# Patient Record
Sex: MALE | Race: WHITE | NOT HISPANIC OR LATINO | Employment: FULL TIME | URBAN - METROPOLITAN AREA
[De-identification: names, ages, dates, MRNs, and addresses within clinical notes are randomized per-mention and may not be internally consistent; named-entity substitution may affect disease eponyms.]

---

## 2017-02-03 ENCOUNTER — TELEPHONE (OUTPATIENT)
Dept: GASTROENTEROLOGY | Facility: CLINIC | Age: 52
End: 2017-02-03

## 2017-02-03 NOTE — TELEPHONE ENCOUNTER
Third attempt to reach patient to schedule Colonoscopy. Not Scheduled at [facility]. Left Messages.

## 2017-03-16 DIAGNOSIS — I10 HYPERTENSION GOAL BP (BLOOD PRESSURE) < 140/90: ICD-10-CM

## 2017-03-16 NOTE — TELEPHONE ENCOUNTER
lisinopril (PRINIVIL/ZESTRIL) 10 MG tablet     Last Written Prescription Date: 12-  Last Fill Quantity: 30, # refills: 1  Last Office Visit with G, P or [facility] prescribing provider: 12-       Potassium   Date Value Ref Range Status   12/19/2016 3.5 3.4 - 5.3 mmol/L Final     Creatinine   Date Value Ref Range Status   12/19/2016 1.02 0.66 - 1.25 mg/dL Final     BP Readings from Last 3 Encounters:   12/19/16 152/82   10/09/15 (!) 143/91   02/14/14 (!) 159/95

## 2017-03-17 RX ORDER — LISINOPRIL 10 MG/1
10 TABLET ORAL DAILY
Qty: 30 TABLET | Refills: 0 | Status: SHIPPED | OUTPATIENT
Start: 2017-03-17 | End: 2017-05-09

## 2017-03-17 NOTE — TELEPHONE ENCOUNTER
Routing refill request to provider for review/approval because:  Labs out of range:  BP not at goal. Patient has not followed up with BP checks.   Tisha Chowdary R.N.

## 2017-03-17 NOTE — TELEPHONE ENCOUNTER
Temp re-fill only, please notify due for BP f/u.  Electronically Signed By: Olive Cervantes PA-C

## 2017-03-22 NOTE — TELEPHONE ENCOUNTER
Attempted to call patient - number not going through.    ValentinoHopefreedom Mcgee  -[facility]

## 2017-05-09 DIAGNOSIS — I10 HYPERTENSION GOAL BP (BLOOD PRESSURE) < 140/90: ICD-10-CM

## 2017-05-09 RX ORDER — LISINOPRIL 10 MG/1
10 TABLET ORAL DAILY
Qty: 30 TABLET | Refills: 0 | Status: SHIPPED | OUTPATIENT
Start: 2017-05-09 | End: 2017-11-03 | Stop reason: DRUGHIGH

## 2017-05-09 NOTE — TELEPHONE ENCOUNTER
Please call to set up OV, overdue for BP follow-up.   Electronically Signed By: Olive Cervantes PA-C

## 2017-05-09 NOTE — TELEPHONE ENCOUNTER
Routing refill request to provider for review/approval because:  Portia given x1 and patient did not follow up, please advise  Labs out of range:  BP not at goal  A break in medication  Leidy Rice RN, BSN  [facility] [place]

## 2017-05-09 NOTE — TELEPHONE ENCOUNTER
lisinopril (PRINIVIL/ZESTRIL) 10 MG tablet      Last Written Prescription Date: 3/17/2017  Last Fill Quantity: 30 tablet, # refills: 0  Last Office Visit with G, P or [facility] prescribing provider: 12/19/2016       Potassium   Date Value Ref Range Status   12/19/2016 3.5 3.4 - 5.3 mmol/L Final     Creatinine   Date Value Ref Range Status   12/19/2016 1.02 0.66 - 1.25 mg/dL Final     BP Readings from Last 3 Encounters:   12/19/16 152/82   10/09/15 (!) 143/91   02/14/14 (!) 159/95

## 2017-05-10 NOTE — TELEPHONE ENCOUNTER
Called pt and made appt for Friday at 420.  Pt states he has enough medication to last until his appt.  Removed pended med and closed encounter.  Janis Carter

## 2017-05-12 ENCOUNTER — OFFICE VISIT (OUTPATIENT)
Dept: FAMILY MEDICINE | Facility: CLINIC | Age: 52
End: 2017-05-12
Payer: COMMERCIAL

## 2017-05-12 VITALS
BODY MASS INDEX: 36.36 KG/M2 | WEIGHT: 254 LBS | HEART RATE: 83 BPM | OXYGEN SATURATION: 97 % | SYSTOLIC BLOOD PRESSURE: 142 MMHG | TEMPERATURE: 98.3 F | HEIGHT: 70 IN | DIASTOLIC BLOOD PRESSURE: 82 MMHG

## 2017-05-12 DIAGNOSIS — I10 HYPERTENSION GOAL BP (BLOOD PRESSURE) < 140/90: Primary | ICD-10-CM

## 2017-05-12 PROCEDURE — 36415 COLL VENOUS BLD VENIPUNCTURE: CPT | Performed by: PHYSICIAN ASSISTANT

## 2017-05-12 PROCEDURE — 80048 BASIC METABOLIC PNL TOTAL CA: CPT | Performed by: PHYSICIAN ASSISTANT

## 2017-05-12 PROCEDURE — 99213 OFFICE O/P EST LOW 20 MIN: CPT | Performed by: PHYSICIAN ASSISTANT

## 2017-05-12 RX ORDER — LISINOPRIL 20 MG/1
20 TABLET ORAL DAILY
Qty: 30 TABLET | Refills: 1 | Status: SHIPPED | OUTPATIENT
Start: 2017-05-12 | End: 2017-08-03

## 2017-05-12 NOTE — PATIENT INSTRUCTIONS
Some improvement today, but not yet at goal.  Increase to 20mg daily.  Re-check in 1 month.    Electronically Signed By: Olive Cervantes PA-C

## 2017-05-12 NOTE — PROGRESS NOTES
SUBJECTIVE:                                                    Eliseo Oneal is a 52 year old male who presents to clinic today for the following health issues:      Hypertension Follow-up; never came back for RN visit.  Still only taking 10mg of lisinopril.  BP still elevated.  Had been improving his routine with stopping soda and starting to walk.  Then decided they may buy a new house so then fell off the wagon again.   Feels he will be able to get back to walking dog though.   Tolerating medication well - no cough.       Outpatient blood pressures are not being checked.    Low Salt Diet: low salt       Amount of exercise or physical activity:     Problems taking medications regularly: No    Medication side effects: none    Diet: low salt      Problem list and histories reviewed & adjusted, as indicated.  Additional history: as documented    Patient Active Problem List   Diagnosis     CARDIOVASCULAR SCREENING; LDL GOAL LESS THAN 160     Obesity (BMI 30-39.9)     Hypertension goal BP (blood pressure) < 140/90     SCOTT (obstructive sleep apnea)     Ventral hernia without obstruction or gangrene     Past Surgical History:   Procedure Laterality Date     NO HISTORY OF SURGERY         Social History   Substance Use Topics     Smoking status: Never Smoker     Smokeless tobacco: Never Used     Alcohol use No     Family History   Problem Relation Age of Onset     DIABETES Father 58     Type II diabetes     CEREBROVASCULAR DISEASE Father      DIABETES Paternal Grandmother      Colon Cancer No family hx of      Prostate Cancer No family hx of      Coronary Artery Disease No family hx of      Hypertension No family hx of      Hyperlipidemia No family hx of      Breast Cancer No family hx of      Thyroid Disease No family hx of          Current Outpatient Prescriptions   Medication Sig Dispense Refill     lisinopril (PRINIVIL/ZESTRIL) 10 MG tablet Take 1 tablet (10 mg) by mouth daily Temp re-fill only due for BP  "follow-up. 30 tablet 0     No Known Allergies    Reviewed and updated as needed this visit by clinical staff       Reviewed and updated as needed this visit by Provider         ROS:  Constitutional, cardiovascular, pulmonary, systems are negative, except as otherwise noted.    OBJECTIVE:                                                    /82 (BP Location: Right arm, Cuff Size: Adult Large)  Pulse 83  Temp 98.3  F (36.8  C) (Oral)  Ht 5' 10\" (1.778 m)  Wt 254 lb (115.2 kg)  SpO2 97%  BMI 36.45 kg/m2  Body mass index is 36.45 kg/(m^2).  GENERAL: healthy, alert and no distress  RESP: lungs clear to auscultation - no rales, rhonchi or wheezes  CV: regular rate and rhythm, normal S1 S2, no S3 or S4, no murmur, click or rub, no peripheral edema and peripheral pulses strong  EXT: No LE edema.    Diagnostic Test Results:  none      ASSESSMENT/PLAN:                                                        ICD-10-CM    1. Hypertension goal BP (blood pressure) < 140/90 I10 lisinopril (PRINIVIL/ZESTRIL) 20 MG tablet     Basic metabolic panel  (Ca, Cl, CO2, Creat, Gluc, K, Na, BUN)   Encouraged to continue exercise and weight loss efforts as previously reviewed.  Repeat BMP since starting ACEI.  See Patient Instructions  Patient Instructions   Some improvement today, but not yet at goal.  Increase to 20mg daily.  Re-check in 1 month.    Electronically Signed By: Olive Cervantes PA-C          "

## 2017-05-12 NOTE — NURSING NOTE
"Chief Complaint   Patient presents with     Hypertension       Initial /82 (BP Location: Right arm, Cuff Size: Adult Large)  Pulse 83  Temp 98.3  F (36.8  C) (Oral)  Ht 5' 10\" (1.778 m)  Wt 254 lb (115.2 kg)  SpO2 97%  BMI 36.45 kg/m2 Estimated body mass index is 36.45 kg/(m^2) as calculated from the following:    Height as of this encounter: 5' 10\" (1.778 m).    Weight as of this encounter: 254 lb (115.2 kg).  Medication Reconciliation: complete    "

## 2017-05-12 NOTE — MR AVS SNAPSHOT
"              After Visit Summary   5/12/2017    Eliseo Oneal    MRN: [id number]           Patient Information     Date Of Birth          1965        Visit Information        Provider Department      5/12/2017 4:20 PM Olive Cervantes PA-C [facility] [place]        Today's Diagnoses     Hypertension goal BP (blood pressure) < 140/90    -  1      Care Instructions    Some improvement today, but not yet at goal.  Increase to 20mg daily.  Re-check in 1 month.    Electronically Signed By: Olive Cervantes PA-C          Follow-ups after your visit        Who to contact     If you have questions or need follow up information about today's clinic visit or your schedule please contact [facility] [place] directly at [phone].  Normal or non-critical lab and imaging results will be communicated to you by MyChart, letter or phone within 4 business days after the clinic has received the results. If you do not hear from us within 7 days, please contact the clinic through MyChart or phone. If you have a critical or abnormal lab result, we will notify you by phone as soon as possible.  Submit refill requests through [employer] or call your pharmacy and they will forward the refill request to us. Please allow 3 business days for your refill to be completed.          Additional Information About Your Visit        [employer] Information     [employer] lets you send messages to your doctor, view your test results, renew your prescriptions, schedule appointments and more. To sign up, go to [email or web address] . Click on \"Log in\" on the left side of the screen, which will take you to the Welcome page. Then click on \"Sign up Now\" on the right side of the page.     You will be asked to enter the access code listed below, as well as some personal information. Please follow the directions to create your username and password.     Your access code is: [email or web address]  Expires: 8/10/2017  4:28 PM     Your access " "code will  in 90 days. If you need help or a new code, please call your [facility] clinic or [phone].        Care EveryWhere ID     This is your Care EveryWhere ID. This could be used by other organizations to access your [facility] medical records  [id number]        Your Vitals Were     Pulse Temperature Height Pulse Oximetry BMI (Body Mass Index)       83 98.3  F (36.8  C) (Oral) 5' 10\" (1.778 m) 97% 36.45 kg/m2        Blood Pressure from Last 3 Encounters:   17 142/82   16 152/82   10/09/15 (!) 143/91    Weight from Last 3 Encounters:   17 254 lb (115.2 kg)   16 260 lb (117.9 kg)   10/09/15 241 lb (109.3 kg)              We Performed the Following     Basic metabolic panel  (Ca, Cl, CO2, Creat, Gluc, K, Na, BUN)          Today's Medication Changes          These changes are accurate as of: 17  4:28 PM.  If you have any questions, ask your nurse or doctor.               These medicines have changed or have updated prescriptions.        Dose/Directions    * lisinopril 10 MG tablet   Commonly known as:  PRINIVIL/ZESTRIL   This may have changed:  Another medication with the same name was added. Make sure you understand how and when to take each.   Used for:  Hypertension goal BP (blood pressure) < 140/90   Changed by:  Olive Cervantes PA-C        Dose:  10 mg   Take 1 tablet (10 mg) by mouth daily Temp re-fill only due for BP follow-up.   Quantity:  30 tablet   Refills:  0       * lisinopril 20 MG tablet   Commonly known as:  PRINIVIL/ZESTRIL   This may have changed:  You were already taking a medication with the same name, and this prescription was added. Make sure you understand how and when to take each.   Used for:  Hypertension goal BP (blood pressure) < 140/90   Changed by:  Olive Cervantes PA-C        Dose:  20 mg   Take 1 tablet (20 mg) by mouth daily   Quantity:  30 tablet   Refills:  1       * Notice:  This list has 2 medication(s) that are the " same as other medications prescribed for you. Read the directions carefully, and ask your doctor or other care provider to review them with you.         Where to get your medicines      These medications were sent to [id number] - [place], [place] - [address]  [address] [place] [address]     Phone:  [phone]     lisinopril 20 MG tablet                [facility] Care Provider    [facility]       No address on file        Thank you!     Thank you for choosing [facility]  for your care. Our goal is always to provide you with excellent care. Hearing back from our patients is one way we can continue to improve our services. Please take a few minutes to complete the written survey that you may receive in the mail after your visit with us. Thank you!             Your Updated Medication List - Protect others around you: Learn how to safely use, store and throw away your medicines at www.disposemymeds.org.          This list is accurate as of: 5/12/17  4:28 PM.  Always use your most recent med list.                   Brand Name Dispense Instructions for use    * lisinopril 10 MG tablet    PRINIVIL/ZESTRIL    30 tablet    Take 1 tablet (10 mg) by mouth daily Temp re-fill only due for BP follow-up.       * lisinopril 20 MG tablet    PRINIVIL/ZESTRIL    30 tablet    Take 1 tablet (20 mg) by mouth daily       * Notice:  This list has 2 medication(s) that are the same as other medications prescribed for you. Read the directions carefully, and ask your doctor or other care provider to review them with you.

## 2017-05-15 LAB
ANION GAP SERPL CALCULATED.3IONS-SCNC: 7 MMOL/L (ref 3–14)
BUN SERPL-MCNC: 15 MG/DL (ref 7–30)
CALCIUM SERPL-MCNC: 8.8 MG/DL (ref 8.5–10.1)
CHLORIDE SERPL-SCNC: 110 MMOL/L (ref 94–109)
CO2 SERPL-SCNC: 27 MMOL/L (ref 20–32)
CREAT SERPL-MCNC: 1.32 MG/DL (ref 0.66–1.25)
GFR SERPL CREATININE-BSD FRML MDRD: 57 ML/MIN/1.7M2
GLUCOSE SERPL-MCNC: 108 MG/DL (ref 70–99)
POTASSIUM SERPL-SCNC: 3.9 MMOL/L (ref 3.4–5.3)
SODIUM SERPL-SCNC: 144 MMOL/L (ref 133–144)

## 2017-05-19 DIAGNOSIS — N28.9 ABNORMAL RENAL FUNCTION: Primary | ICD-10-CM

## 2017-05-19 NOTE — PROGRESS NOTES
Please CALL patient with the following results:    BMP worsened in comparison to previous. Unclear if this is side effect from his lisinopril as rarely can cause acute kidney injury. Is he taking NSAIDs? If so, please have him discontinue these as well. Given we did increase his dose at last visit, please have him RTC next week to have labs repeated. If worse, we will need to have him stop medication and change to an alternative. Ok to continue for now though until re-assessed.     Electronically Signed By: Olive Cervantes PA-C

## 2017-05-25 DIAGNOSIS — N28.9 ABNORMAL RENAL FUNCTION: ICD-10-CM

## 2017-05-25 PROCEDURE — 80048 BASIC METABOLIC PNL TOTAL CA: CPT | Performed by: PHYSICIAN ASSISTANT

## 2017-05-25 PROCEDURE — 36415 COLL VENOUS BLD VENIPUNCTURE: CPT | Performed by: PHYSICIAN ASSISTANT

## 2017-05-25 NOTE — LETTER
[facility]          [address] [place] [address]                                           [phone]  May 27, 2017     Eliseo Oneal  [address] [address]      Dear Eliseo,    The results of your recent tests were reviewed and are as follows:    -Kidney function is normal (Cr, GFR), Sodium is normal, Potassium is normal, Calcium is normal, Glucose is normal (diabetes screening test).     Kidney function returned to normal. Ok to continue Lisinopril as previously discussed and we can consider repeating again in the next couple of months for stability.     Enclosed is a copy of the results.     Thank you for choosing [facility].  We appreciate the opportunity to serve you and look forward to supporting your healthcare needs in the future.    If you have any questions or concerns, please call me or my staff at [phone].      Sincerely,    SHARI Umaña

## 2017-05-26 LAB
ANION GAP SERPL CALCULATED.3IONS-SCNC: 8 MMOL/L (ref 3–14)
BUN SERPL-MCNC: 15 MG/DL (ref 7–30)
CALCIUM SERPL-MCNC: 8.6 MG/DL (ref 8.5–10.1)
CHLORIDE SERPL-SCNC: 108 MMOL/L (ref 94–109)
CO2 SERPL-SCNC: 27 MMOL/L (ref 20–32)
CREAT SERPL-MCNC: 1.02 MG/DL (ref 0.66–1.25)
GFR SERPL CREATININE-BSD FRML MDRD: 76 ML/MIN/1.7M2
GLUCOSE SERPL-MCNC: 115 MG/DL (ref 70–99)
POTASSIUM SERPL-SCNC: 3.6 MMOL/L (ref 3.4–5.3)
SODIUM SERPL-SCNC: 143 MMOL/L (ref 133–144)

## 2017-05-26 NOTE — PROGRESS NOTES
Please call patient with the following results:    -Kidney function is normal (Cr, GFR), Sodium is normal, Potassium is normal, Calcium is normal, Glucose is normal (diabetes screening test).     Kidney function returned to normal. Ok to continue Lisinopril as previously discussed and we can consider repeating again in the next couple of months for stability.    Electronically Signed By: Olive Cervantes PA-C

## 2017-08-03 DIAGNOSIS — I10 HYPERTENSION GOAL BP (BLOOD PRESSURE) < 140/90: ICD-10-CM

## 2017-08-03 RX ORDER — LISINOPRIL 20 MG/1
TABLET ORAL
Qty: 30 TABLET | Refills: 0 | Status: SHIPPED | OUTPATIENT
Start: 2017-08-03 | End: 2017-10-08

## 2017-08-03 NOTE — TELEPHONE ENCOUNTER
Due for an Office visit for further refills, only fill for 30 days     Latricia Shepherd RN, BSN  [facility]

## 2017-08-03 NOTE — TELEPHONE ENCOUNTER
lisinopril (PRINIVIL/ZESTRIL) 20 MG tablet      Last Written Prescription Date: 5/12/2017  Last Fill Quantity: 30 tablet, # refills: 1  Last Office Visit with [facility], [facility] or [facility] prescribing provider: 5/12/2017       Potassium   Date Value Ref Range Status   05/25/2017 3.6 3.4 - 5.3 mmol/L Final     Creatinine   Date Value Ref Range Status   05/25/2017 1.02 0.66 - 1.25 mg/dL Final     BP Readings from Last 3 Encounters:   05/12/17 142/82   12/19/16 152/82   10/09/15 (!) 143/91

## 2017-10-08 DIAGNOSIS — I10 HYPERTENSION GOAL BP (BLOOD PRESSURE) < 140/90: ICD-10-CM

## 2017-10-08 NOTE — LETTER
[facility]  [address] [place] [address]                                                                                                     [phone]    Eliseo Oneal  [address] [address]    October 16, 2017    Dear Eliseo Oneal,                               We were able to refill the medication you need for a one time brandon refill, but we will need to see you in the office before we can provide any further refills.  Please call [phone] to schedule an appointment for a medication check at your earliest convenience.    Thank you,  Janis

## 2017-10-09 NOTE — TELEPHONE ENCOUNTER
Routing refill request to provider for review/approval because:  Portia given x1 and patient did not follow up, please advise  Patient needs to be seen because:  Overdue since June for follow up labs and OV.     Tisha Chowdary R.N.  Will route to  to call patient to set up appointment. .

## 2017-10-09 NOTE — TELEPHONE ENCOUNTER
LISINOPRIL 20 MG TABLET    Last Written Prescription Date: 08/03/17  Last Fill Quantity: 30, # refills: 0  Last Office Visit with G, P or [facility] prescribing provider: 05/12/17       Potassium   Date Value Ref Range Status   05/25/2017 3.6 3.4 - 5.3 mmol/L Final     Creatinine   Date Value Ref Range Status   05/25/2017 1.02 0.66 - 1.25 mg/dL Final     BP Readings from Last 3 Encounters:   05/12/17 142/82   12/19/16 152/82   10/09/15 (!) 143/91

## 2017-10-09 NOTE — TELEPHONE ENCOUNTER
Lisinopril      Last Written Prescription Date: 8/3/2017  Last Fill Quantity: 30, # refills: 0  Last Office Visit with G, P or [facility] prescribing provider: 5/12/2017       Potassium   Date Value Ref Range Status   05/25/2017 3.6 3.4 - 5.3 mmol/L Final     Creatinine   Date Value Ref Range Status   05/25/2017 1.02 0.66 - 1.25 mg/dL Final     BP Readings from Last 3 Encounters:   05/12/17 142/82   12/19/16 152/82   10/09/15 (!) 143/91

## 2017-10-10 RX ORDER — LISINOPRIL 20 MG/1
TABLET ORAL
Qty: 15 TABLET | Refills: 0 | Status: SHIPPED | OUTPATIENT
Start: 2017-10-10 | End: 2017-11-03

## 2017-10-10 NOTE — TELEPHONE ENCOUNTER
Temporary re-fill, pt is due for OV. Please notify. No further re-fills will be provided until seen for follow-up.  Electronically Signed By: Olive Cervantes PA-C

## 2017-10-12 NOTE — TELEPHONE ENCOUNTER
Called [phone] and left non detailed message for pt to call back.  See below.  Janis Carter

## 2017-11-03 ENCOUNTER — OFFICE VISIT (OUTPATIENT)
Dept: FAMILY MEDICINE | Facility: CLINIC | Age: 52
End: 2017-11-03
Payer: COMMERCIAL

## 2017-11-03 VITALS
SYSTOLIC BLOOD PRESSURE: 130 MMHG | TEMPERATURE: 98.5 F | DIASTOLIC BLOOD PRESSURE: 82 MMHG | HEART RATE: 74 BPM | HEIGHT: 70 IN | BODY MASS INDEX: 33.21 KG/M2 | OXYGEN SATURATION: 99 % | WEIGHT: 232 LBS

## 2017-11-03 DIAGNOSIS — I10 HYPERTENSION GOAL BP (BLOOD PRESSURE) < 140/90: Primary | ICD-10-CM

## 2017-11-03 DIAGNOSIS — Z23 NEED FOR INFLUENZA VACCINATION: ICD-10-CM

## 2017-11-03 DIAGNOSIS — R73.9 ELEVATED BLOOD SUGAR: ICD-10-CM

## 2017-11-03 DIAGNOSIS — R63.4 LOSS OF WEIGHT: ICD-10-CM

## 2017-11-03 LAB — HBA1C MFR BLD: 5.7 % (ref 4.3–6)

## 2017-11-03 PROCEDURE — 36415 COLL VENOUS BLD VENIPUNCTURE: CPT | Performed by: PHYSICIAN ASSISTANT

## 2017-11-03 PROCEDURE — 83036 HEMOGLOBIN GLYCOSYLATED A1C: CPT | Performed by: PHYSICIAN ASSISTANT

## 2017-11-03 PROCEDURE — 82043 UR ALBUMIN QUANTITATIVE: CPT | Performed by: PHYSICIAN ASSISTANT

## 2017-11-03 PROCEDURE — 99213 OFFICE O/P EST LOW 20 MIN: CPT | Performed by: PHYSICIAN ASSISTANT

## 2017-11-03 RX ORDER — LISINOPRIL 20 MG/1
20 TABLET ORAL DAILY
Qty: 90 TABLET | Refills: 1 | Status: SHIPPED | OUTPATIENT
Start: 2017-11-03 | End: 2018-05-24

## 2017-11-03 NOTE — LETTER
November 10, 2017      Eliseo Oneal  [address] [address]        Dear ,    We are writing to inform you of your test results.    -A1C (diabetic test) is normal and indicates that your blood sugar has been in a normal range the last 3 months.  -Microalbumin (urine protein) test is normal.  ADVISE: recheck annually  Keep up the good work with your weight loss!    Resulted Orders   Hemoglobin A1c   Result Value Ref Range    Hemoglobin A1C 5.7 4.3 - 6.0 %   Albumin Random Urine Quantitative with Creat Ratio   Result Value Ref Range    Creatinine Urine 313 mg/dL    Albumin Urine mg/L 17 mg/L    Albumin Urine mg/g Cr 5.46 0 - 17 mg/g Cr       If you have any questions or concerns, please call the clinic at the number listed above.       Sincerely,        Olive Cervantes PA-C

## 2017-11-03 NOTE — PATIENT INSTRUCTIONS
Great work with weight loss!  Continue BP medication without changes.  Try to avoid NSAIDs as much as you can.  Will check hgb a1c to rule out diabetes as last BS was elevated.  Follow-up for physical when due again.    Electronically Signed By: Olive Cervantes PA-C

## 2017-11-03 NOTE — NURSING NOTE
"Chief Complaint   Patient presents with     Hypertension       Initial Pulse 74  Temp 98.5  F (36.9  C) (Oral)  Ht 5' 10\" (1.778 m)  Wt 232 lb (105.2 kg)  SpO2 99%  BMI 33.29 kg/m2 Estimated body mass index is 33.29 kg/(m^2) as calculated from the following:    Height as of this encounter: 5' 10\" (1.778 m).    Weight as of this encounter: 232 lb (105.2 kg).  Medication Reconciliation: complete    "

## 2017-11-03 NOTE — PROGRESS NOTES
SUBJECTIVE:   Eliseo Oneal is a 52 year old male who presents to clinic today for the following health issues:      Hypertension Follow-up; re-check since increase of lisinopril to 20mg daily. Tolerates this well and not having any issues. No cough.   Wife is getting him out to run and job. Sometimes this makes him sore so he will take advil on occasion.  Did [employer] throughout the week from that, but tryng not to take consistent.   Not a daily use for sure.  Has lost about 32 lbs.  Went to [employer] with his wife as well.   Cut out a lot of soda.  Daughter is participating as well.       Outpatient blood pressures are not being checked.    Low Salt Diet: not monitoring salt    Amount of exercise or physical activity:     Problems taking medications regularly: No    Medication side effects: none    Diet: regular (no restrictions)    2) Elevated BS and needs to have hgb a1c.    Problem list and histories reviewed & adjusted, as indicated.  Additional history: as documented    Patient Active Problem List   Diagnosis     CARDIOVASCULAR SCREENING; LDL GOAL LESS THAN 160     Obesity (BMI 30-39.9)     Hypertension goal BP (blood pressure) < 140/90     SCOTT (obstructive sleep apnea)     Ventral hernia without obstruction or gangrene     Past Surgical History:   Procedure Laterality Date     NO HISTORY OF SURGERY         Social History   Substance Use Topics     Smoking status: Never Smoker     Smokeless tobacco: Never Used     Alcohol use No     Family History   Problem Relation Age of Onset     DIABETES Father 58     Type II diabetes     CEREBROVASCULAR DISEASE Father      DIABETES Paternal Grandmother      Colon Cancer No family hx of      Prostate Cancer No family hx of      Coronary Artery Disease No family hx of      Hypertension No family hx of      Hyperlipidemia No family hx of      Breast Cancer No family hx of      Thyroid Disease No family hx of          Current Outpatient Prescriptions  "  Medication Sig Dispense Refill     lisinopril (PRINIVIL/ZESTRIL) 20 MG tablet Take 1 tablet (20 mg) by mouth daily 90 tablet 1     [DISCONTINUED] lisinopril (PRINIVIL/ZESTRIL) 20 MG tablet TAKE 1 TABLET BY MOUTH DAILY (Patient not taking: Reported on 11/3/2017) 15 tablet 0     [DISCONTINUED] lisinopril (PRINIVIL/ZESTRIL) 10 MG tablet Take 1 tablet (10 mg) by mouth daily Temp re-fill only due for BP follow-up. 30 tablet 0     No Known Allergies      Reviewed and updated as needed this visit by clinical staffTobacco  Allergies  Meds  Med Hx  Surg Hx  Fam Hx  Soc Hx      Reviewed and updated as needed this visit by Provider  Tobacco  Allergies  Meds  Med Hx  Surg Hx  Fam Hx  Soc Hx        ROS:  Constitutional + for weight loss, HEENT, cardiovascular, pulmonary systems are negative, except as otherwise noted.      OBJECTIVE:   /82  Pulse 74  Temp 98.5  F (36.9  C) (Oral)  Ht 5' 10\" (1.778 m)  Wt 232 lb (105.2 kg)  SpO2 99%  BMI 33.29 kg/m2  Body mass index is 33.29 kg/(m^2).  GENERAL: healthy, alert and no distress  EYES: Eyes grossly normal to inspection, PERRL and conjunctivae and sclerae normal  RESP: lungs clear to auscultation - no rales, rhonchi or wheezes  CV: regular rate and rhythm, normal S1 S2, no S3 or S4, no murmur, click or rub, no peripheral edema and peripheral pulses strong    Diagnostic Test Results:  none     ASSESSMENT/PLAN:       ICD-10-CM    1. Hypertension goal BP (blood pressure) < 140/90 I10 Albumin Random Urine Quantitative with Creat Ratio     lisinopril (PRINIVIL/ZESTRIL) 20 MG tablet   2. Elevated blood sugar R73.9 Hemoglobin A1c   3. Loss of weight R63.4    4. Need for influenza vaccination - pt declines Z23    BP improved after lisinopril adjustment last visit.  Was able to loose 30 lbs as well and encourage his on-going efforts.  Check microalbumin today and hgb a1c based on last elevated BS as well.  Can re-check BP again when due for CPE.  Pt in agreement with " plan.   See Patient Instructions  Patient Instructions   Great work with weight loss!  Continue BP medication without changes.  Try to avoid NSAIDs as much as you can.  Will check hgb a1c to rule out diabetes as last BS was elevated.  Follow-up for physical when due again.    Electronically Signed By: Olive Cervantes PA-C

## 2017-11-03 NOTE — MR AVS SNAPSHOT
"              After Visit Summary   11/3/2017    Eliseo Oneal    MRN: [id number]           Patient Information     Date Of Birth          1965        Visit Information        Provider Department      11/3/2017 10:40 AM Olive Cervantes PA-C [facility] [place]        Today's Diagnoses     Elevated blood sugar    -  1    Hypertension goal BP (blood pressure) < 140/90          Care Instructions    Great work with weight loss!  Continue BP medication without changes.  Try to avoid NSAIDs as much as you can.  Will check hgb a1c to rule out diabetes as last BS was elevated.  Follow-up for physical when due again.    Electronically Signed By: Olive Cervantes PA-C            Follow-ups after your visit        Who to contact     If you have questions or need follow up information about today's clinic visit or your schedule please contact [facility] directly at [phone].  Normal or non-critical lab and imaging results will be communicated to you by MyChart, letter or phone within 4 business days after the clinic has received the results. If you do not hear from us within 7 days, please contact the clinic through ServiceRelated[employer] or phone. If you have a critical or abnormal lab result, we will notify you by phone as soon as possible.  Submit refill requests through [employer] or call your pharmacy and they will forward the refill request to us. Please allow 3 business days for your refill to be completed.          Additional Information About Your Visit        MyChart Information     [employer] lets you send messages to your doctor, view your test results, renew your prescriptions, schedule appointments and more. To sign up, go to [email or web address] . Click on \"Log in\" on the left side of the screen, which will take you to the Welcome page. Then click on \"Sign up Now\" on the right side of the page.     You will be asked to enter the access code listed below, as well as some personal " "information. Please follow the directions to create your username and password.     Your access code is: [email or web address]  Expires: 2018 11:24 AM     Your access code will  in 90 days. If you need help or a new code, please call your [facility] clinic or [phone].        Care EveryWhere ID     This is your Care EveryWhere ID. This could be used by other organizations to access your [facility] medical records  [id number]        Your Vitals Were     Pulse Temperature Height Pulse Oximetry BMI (Body Mass Index)       74 98.5  F (36.9  C) (Oral) 5' 10\" (1.778 m) 99% 33.29 kg/m2        Blood Pressure from Last 3 Encounters:   17 130/82   17 142/82   16 152/82    Weight from Last 3 Encounters:   17 232 lb (105.2 kg)   17 254 lb (115.2 kg)   16 260 lb (117.9 kg)              We Performed the Following     Albumin Random Urine Quantitative with Creat Ratio     Hemoglobin A1c          Today's Medication Changes          These changes are accurate as of: 11/3/17 11:24 AM.  If you have any questions, ask your nurse or doctor.               These medicines have changed or have updated prescriptions.        Dose/Directions    lisinopril 20 MG tablet   Commonly known as:  PRINIVIL/ZESTRIL   This may have changed:  See the new instructions.   Used for:  Hypertension goal BP (blood pressure) < 140/90   Changed by:  Olive Cervantes PA-C        Dose:  20 mg   Take 1 tablet (20 mg) by mouth daily   Quantity:  90 tablet   Refills:  1            Where to get your medicines      These medications were sent to [facility] [address] [place]  [place], [place] - [address]  [address] [address]     Phone:  [phone]     lisinopril 20 MG tablet                Primary Care Provider Office Phone # Fax #    [facility] [phone] [address]  [place] [address]        Equal Access to Services     MARY URBINA AH: Jo Ann figueredo " Sugar, watrinida lujarrettbaljinder, rafael kaangel fraire, abhi owenstessa carlos. So [facility] [phone].    ATENCIÓN: Si habla bettye, tiene a childress disposición servicios gratuitos de asistencia lingüística. Qi al [phone].    We comply with applicable federal civil rights laws and [place] laws. We do not discriminate on the basis of race, color, national origin, age, disability, sex, sexual orientation, or gender identity.            Thank you!     Thank you for choosing [facility] [place]  for your care. Our goal is always to provide you with excellent care. Hearing back from our patients is one way we can continue to improve our services. Please take a few minutes to complete the written survey that you may receive in the mail after your visit with us. Thank you!             Your Updated Medication List - Protect others around you: Learn how to safely use, store and throw away your medicines at www.disposemymeds.org.          This list is accurate as of: 11/3/17 11:24 AM.  Always use your most recent med list.                   Brand Name Dispense Instructions for use Diagnosis    lisinopril 20 MG tablet    PRINIVIL/ZESTRIL    90 tablet    Take 1 tablet (20 mg) by mouth daily    Hypertension goal BP (blood pressure) < 140/90

## 2017-11-04 LAB
CREAT UR-MCNC: 313 MG/DL
MICROALBUMIN UR-MCNC: 17 MG/L
MICROALBUMIN/CREAT UR: 5.46 MG/G CR (ref 0–17)

## 2017-11-09 NOTE — PROGRESS NOTES
Please call or write patient with the following results:    -A1C (diabetic test) is normal and indicates that your blood sugar has been in a normal range the last 3 months.  -Microalbumin (urine protein) test is normal.  ADVISE: recheck annually  Keep up the good work with your weight loss!    Electronically Signed By: Olive Cervantes PA-C

## 2018-05-24 DIAGNOSIS — I10 HYPERTENSION GOAL BP (BLOOD PRESSURE) < 140/90: ICD-10-CM

## 2018-05-24 RX ORDER — LISINOPRIL 20 MG/1
20 TABLET ORAL DAILY
Qty: 30 TABLET | Refills: 0 | Status: SHIPPED | OUTPATIENT
Start: 2018-05-24 | End: 2018-09-17

## 2018-05-24 NOTE — TELEPHONE ENCOUNTER
Medication is being filled for 1 time refill only due to:  Patient needs to be seen because due HTN check and labs.   Leidy Rice RN, BSN  [facility] [place]

## 2018-05-24 NOTE — TELEPHONE ENCOUNTER
"Requested Prescriptions   Pending Prescriptions Disp Refills     lisinopril (PRINIVIL/ZESTRIL) 20 MG tablet  Last Written Prescription Date:11/3/17  Last Fill Quantity: 90 tablets,  # refills: 1   Last office visit: 11/3/2017 with prescribing provider:  Ministerio DUNHAM     Future Office Visit:   90 tablet 1     Sig: Take 1 tablet (20 mg) by mouth daily    ACE Inhibitors (Including Combos) Protocol Passed    5/24/2018 10:10 AM       Passed - Blood pressure under 140/90 in past 12 months    BP Readings from Last 3 Encounters:   11/03/17 130/82   05/12/17 142/82   12/19/16 152/82          Passed - Recent (12 mo) or future (30 days) visit within the authorizing provider's specialty    Patient had office visit in the last 12 months or has a visit in the next 30 days with authorizing provider or within the authorizing provider's specialty.  See \"Patient Info\" tab in inbasket, or \"Choose Columns\" in Meds & Orders section of the refill encounter.           Passed - Patient is age 18 or older       Passed - Normal serum creatinine on file in past 12 months    Recent Labs   Lab Test  05/25/17   1554   CR  1.02            Passed - Normal serum potassium on file in past 12 months    Recent Labs   Lab Test  05/25/17   1554   POTASSIUM  3.6               "

## 2018-09-06 ENCOUNTER — TELEPHONE (OUTPATIENT)
Dept: FAMILY MEDICINE | Facility: CLINIC | Age: 53
End: 2018-09-06

## 2018-09-06 NOTE — TELEPHONE ENCOUNTER
Needs of attention regarding:  -Wellness (Physical) Visit     Health Maintenance Topics with due status: Overdue       Topic Date Due    HIV SCREEN (SYSTEM ASSIGNED) 04/14/1981    COLON CANCER SCREEN (SYSTEM ASSIGNED) 04/14/2013    WELLNESS VISIT Q1 YR 12/19/2017    ADVANCE DIRECTIVE PLANNING Q5 YRS 04/14/2018    PHQ-2 Q1 YR 05/12/2018    INFLUENZA VACCINE 09/01/2018       Communication:  See Letter

## 2018-09-06 NOTE — LETTER
[facility] [place]          [address] [place] [address]                                                                                                       [phone]  September 6, 2018    Eliseo Oneal  [address]  [place] [address]      Dear Eliseo,    A review of your record shows that you are due for the following health maintenance exam(s):    -Colon Cancer Screening- Recommended every 5-10 years, depending on your history, in order to prevent and detect colon cancer at its earliest stages.  Colon cancer is now the second leading cause of death in the [place] for both men and women and there are over 130,000 new cases and 50,000 deaths per year from colon cancer.  Colonoscopies can prevent 90-95% of these deaths.  Problem lesions can be removed before they ever become cancer.  This test is not only looking for cancer, but also getting rid of precancerious lesions.  You are usually given some sedation which makes the test very comfortable for most people.      If you do not wish to do a colonoscopy or cannot afford to do one, at this time, there is another option. It is called a FIT test or Fecal Immunochemical Occult Blood Test (take home stool sample kit).  It does not replace the colonoscopy for colorectal cancer screening, but it can detect hidden bleeding in the lower colon.  It does need to be repeated every year and if a positive result is obtained, you would be referred for a colonoscopy. The FIT test is really easy to do and does not require any  diet or medication restrictions and involves only one collection sample.      If you have completed either one of these tests or had a flexible sigmoidoscopy in the past five years at another facility, please have the records sent to our clinic so that we can best coordinate your care.  Please call us ([phone]) if you have questions or would like arrange either to do a colonoscopy or obtain the  necessary test kit for the Fecal Occult Test.              In addition, if we see you for your annual health care maintenance exam (complete physical), and it has been over a year since your last exam, then please schedule that as well.  Thank you very much for choosing [facility].   We appreciate the opportunity to serve you and look forward to supporting your healthcare needs in the future.

## 2018-09-17 ENCOUNTER — OFFICE VISIT (OUTPATIENT)
Dept: FAMILY MEDICINE | Facility: CLINIC | Age: 53
End: 2018-09-17
Payer: COMMERCIAL

## 2018-09-17 VITALS
WEIGHT: 246 LBS | HEIGHT: 70 IN | DIASTOLIC BLOOD PRESSURE: 98 MMHG | BODY MASS INDEX: 35.22 KG/M2 | HEART RATE: 64 BPM | SYSTOLIC BLOOD PRESSURE: 144 MMHG | TEMPERATURE: 98 F | OXYGEN SATURATION: 98 %

## 2018-09-17 DIAGNOSIS — E66.01 MORBID OBESITY (H): ICD-10-CM

## 2018-09-17 DIAGNOSIS — Z12.11 SCREEN FOR COLON CANCER: ICD-10-CM

## 2018-09-17 DIAGNOSIS — Z13.1 SCREENING FOR DIABETES MELLITUS: ICD-10-CM

## 2018-09-17 DIAGNOSIS — I10 HYPERTENSION GOAL BP (BLOOD PRESSURE) < 140/90: Primary | ICD-10-CM

## 2018-09-17 LAB — HBA1C MFR BLD: 5.6 % (ref 0–5.6)

## 2018-09-17 PROCEDURE — 99213 OFFICE O/P EST LOW 20 MIN: CPT | Performed by: FAMILY MEDICINE

## 2018-09-17 PROCEDURE — 83036 HEMOGLOBIN GLYCOSYLATED A1C: CPT | Performed by: FAMILY MEDICINE

## 2018-09-17 PROCEDURE — 36415 COLL VENOUS BLD VENIPUNCTURE: CPT | Performed by: FAMILY MEDICINE

## 2018-09-17 PROCEDURE — 80053 COMPREHEN METABOLIC PANEL: CPT | Performed by: FAMILY MEDICINE

## 2018-09-17 RX ORDER — LISINOPRIL 20 MG/1
20 TABLET ORAL DAILY
Qty: 90 TABLET | Refills: 1 | Status: SHIPPED | OUTPATIENT
Start: 2018-09-17 | End: 2019-04-30

## 2018-09-17 RX ORDER — LISINOPRIL 20 MG/1
20 TABLET ORAL DAILY
Qty: 30 TABLET | Refills: 0 | Status: SHIPPED | OUTPATIENT
Start: 2018-09-17 | End: 2018-09-17

## 2018-09-17 NOTE — PROGRESS NOTES
SUBJECTIVE:                                                    Eliseo Oneal is a 53 year old male who presents to clinic today for the following health issues:      Hypertension Follow-up      Outpatient blood pressures are not being checked.    Low Salt Diet: no added salt    Amount of exercise or physical activity: 6-7 days/week for an average of 30-45 minutes    Problems taking medications regularly: Yes,  Off the medication for 30 days     Medication side effects: none    Diet: low salt    He is otherwise feeling well. Denies any headaches, lightheadedness, dizziness, vision changes, chest pain, dyspnea.    Problem list and histories reviewed & adjusted, as indicated.  Additional history: as documented    Patient Active Problem List   Diagnosis     CARDIOVASCULAR SCREENING; LDL GOAL LESS THAN 160     Obesity (BMI 30-39.9)     Hypertension goal BP (blood pressure) < 140/90     SCOTT (obstructive sleep apnea)     Ventral hernia without obstruction or gangrene     Obesity (BMI 35.0-39.9) with comorbidity (H)     Past Surgical History:   Procedure Laterality Date     NO HISTORY OF SURGERY         Social History   Substance Use Topics     Smoking status: Never Smoker     Smokeless tobacco: Never Used     Alcohol use No     Family History   Problem Relation Age of Onset     Diabetes Father 58     Type II diabetes     Cerebrovascular Disease Father      Diabetes Paternal Grandmother      Colon Cancer No family hx of      Prostate Cancer No family hx of      Coronary Artery Disease No family hx of      Hypertension No family hx of      Hyperlipidemia No family hx of      Breast Cancer No family hx of      Thyroid Disease No family hx of            ROS:  Constitutional, HEENT, cardiovascular, pulmonary, gi and gu systems are negative, except as otherwise noted.    OBJECTIVE:     BP (!) 144/98 (BP Location: Right arm, Patient Position: Chair, Cuff Size: Adult Large)  Pulse 64  Temp 98  F (36.7  C) (Oral)  Ht 5'  "10\" (1.778 m)  Wt 246 lb (111.6 kg)  SpO2 98%  BMI 35.3 kg/m2  Body mass index is 35.3 kg/(m^2).  GENERAL: healthy, alert and no distress  RESP: lungs clear to auscultation - no rales, rhonchi or wheezes  CV: regular rate and rhythm, normal S1 S2, no S3 or S4, no murmur, click or rub, no peripheral edema and peripheral pulses strong    Diagnostic Test Results:  none     ASSESSMENT/PLAN:   1. Hypertension goal BP (blood pressure) < 140/90: BP elevated, however, has not been on antihypertensive. Restart lisinopril 20 mg daily. Check electrolytes, kidney function.   - lisinopril (PRINIVIL/ZESTRIL) 20 MG tablet; Take 1 tablet (20 mg) by mouth daily  Dispense: 90 tablet; Refill: 1  - Comprehensive metabolic panel    2. Screen for colon cancer  - Fecal colorectal cancer screen (FIT); Future    3. Screening for diabetes mellitus  - Hemoglobin A1c    4. Obesity (BMI 35.0-39.9) with comorbidity (H)  - Hemoglobin A1c  - Comprehensive metabolic panel      Franco Almazan,   [facility] [place]      "

## 2018-09-17 NOTE — LETTER
September 21, 2018      Eliseo Oneal  [address] [address]        Dear ,    We are writing to inform you of your test results.      -Liver and gallbladder tests (ALT,AST, Alk phos,bilirubin) are normal.   -Kidney function (GFR) is normal.   -Sodium is normal.   -Potassium is normal.   -Glucose is normal.   -A1C (diabetic test) is normal and indicates that your blood sugar has been in a normal range the last 3 months.     Resulted Orders   Hemoglobin A1c   Result Value Ref Range    Hemoglobin A1C 5.6 0 - 5.6 %      Comment:      Normal <5.7% Prediabetes 5.7-6.4%  Diabetes 6.5% or higher - adopted from ADA   consensus guidelines.     Comprehensive metabolic panel   Result Value Ref Range    Sodium 142 133 - 144 mmol/L    Potassium 3.9 3.4 - 5.3 mmol/L    Chloride 106 94 - 109 mmol/L    Carbon Dioxide 25 20 - 32 mmol/L    Anion Gap 11 3 - 14 mmol/L    Glucose 82 70 - 99 mg/dL      Comment:      Non Fasting    Urea Nitrogen 22 7 - 30 mg/dL    Creatinine 0.97 0.66 - 1.25 mg/dL    GFR Estimate 80 >60 mL/min/1.7m2      Comment:      CORRECTED ON 09/20 AT 0949: PREVIOUSLY REPORTED AS 80 Non  GFR   Calc      GFR Estimate If Black >90 >60 mL/min/1.7m2      Comment:      CORRECTED ON 09/20 AT 0949: PREVIOUSLY REPORTED AS >90  GFR   Calc      Calcium 8.9 8.5 - 10.1 mg/dL      Comment:      CORRECTED ON 09/20 AT 0949: PREVIOUSLY REPORTED AS 6.9    Bilirubin Total 0.5 0.2 - 1.3 mg/dL    Albumin 3.8 3.4 - 5.0 g/dL    Protein Total 7.3 6.8 - 8.8 g/dL    Alkaline Phosphatase 84 40 - 150 U/L    ALT 34 0 - 70 U/L    AST 26 0 - 45 U/L       If you have any questions or concerns, please call the clinic at the number listed above.       Sincerely,        Franco Almazan, DO

## 2018-09-17 NOTE — MR AVS SNAPSHOT
"              After Visit Summary   9/17/2018    Eliseo Oneal    MRN: [id number]           Patient Information     Date Of Birth          1965        Visit Information        Provider Department      9/17/2018 5:40 PM Franco Almazan, DO [facility]        Today's Diagnoses     Hypertension goal BP (blood pressure) < 140/90    -  1    Screen for colon cancer        Screening for diabetes mellitus        Obesity (BMI 35.0-39.9) with comorbidity (H)           Follow-ups after your visit        Follow-up notes from your care team     Return in about 6 months (around 3/17/2019) for BP Recheck.      Future tests that were ordered for you today     Open Future Orders        Priority Expected Expires Ordered    Fecal colorectal cancer screen (FIT) Routine 10/8/2018 12/10/2018 9/17/2018            Who to contact     If you have questions or need follow up information about today's clinic visit or your schedule please contact [facility] [place] directly at [phone].  Normal or non-critical lab and imaging results will be communicated to you by MyChart, letter or phone within 4 business days after the clinic has received the results. If you do not hear from us within 7 days, please contact the clinic through avandeohart or phone. If you have a critical or abnormal lab result, we will notify you by phone as soon as possible.  Submit refill requests through [employer] or call your pharmacy and they will forward the refill request to us. Please allow 3 business days for your refill to be completed.          Additional Information About Your Visit        Care EveryWhere ID     This is your Care EveryWhere ID. This could be used by other organizations to access your [facility] medical records  [id number]        Your Vitals Were     Pulse Temperature Height Pulse Oximetry BMI (Body Mass Index)       64 98  F (36.7  C) (Oral) 5' 10\" (1.778 m) 98% 35.3 kg/m2        Blood Pressure from Last 3 Encounters: "   09/17/18 (!) 144/98   11/03/17 130/82   05/12/17 142/82    Weight from Last 3 Encounters:   09/17/18 246 lb (111.6 kg)   11/03/17 232 lb (105.2 kg)   05/12/17 254 lb (115.2 kg)              We Performed the Following     Comprehensive metabolic panel     Hemoglobin A1c          Where to get your medicines      These medications were sent to [facility] [address] [place]  [place] - [address]  [address] [place] [address]     Phone:  [phone]     lisinopril 20 MG tablet          Primary Care Provider Office Phone # Fax #    [facility] [phone] [address]  [facility] [address]        Equal Access to Services     MARY URBINA : Hadii grant mcmanus hadasho Sojohanna, waaxda luqadaha, qaybta kaalmada adeegyada, abhi miranda . So [place] -[phone].    ATENCIÓN: Si habla español, tiene a childress disposición servicios gratuitos de asistencia lingüística. Llame al [phone].    We comply with applicable federal civil rights laws and [place] laws. We do not discriminate on the basis of race, color, national origin, age, disability, sex, sexual orientation, or gender identity.            Thank you!     Thank you for choosing [facility]  for your care. Our goal is always to provide you with excellent care. Hearing back from our patients is one way we can continue to improve our services. Please take a few minutes to complete the written survey that you may receive in the mail after your visit with us. Thank you!             Your Updated Medication List - Protect others around you: Learn how to safely use, store and throw away your medicines at www.disposemymeds.org.          This list is accurate as of 9/17/18  6:12 PM.  Always use your most recent med list.                   Brand Name Dispense Instructions for use Diagnosis    lisinopril 20 MG tablet    PRINIVIL/ZESTRIL    30 tablet    Take 1 tablet (20 mg) by mouth daily     Hypertension goal BP (blood pressure) < 140/90

## 2018-09-20 LAB
ALBUMIN SERPL-MCNC: 3.8 G/DL (ref 3.4–5)
ALP SERPL-CCNC: 84 U/L (ref 40–150)
ALT SERPL W P-5'-P-CCNC: 34 U/L (ref 0–70)
ANION GAP SERPL CALCULATED.3IONS-SCNC: 11 MMOL/L (ref 3–14)
AST SERPL W P-5'-P-CCNC: 26 U/L (ref 0–45)
BILIRUB SERPL-MCNC: 0.5 MG/DL (ref 0.2–1.3)
BUN SERPL-MCNC: 22 MG/DL (ref 7–30)
CALCIUM SERPL-MCNC: 8.9 MG/DL (ref 8.5–10.1)
CHLORIDE SERPL-SCNC: 106 MMOL/L (ref 94–109)
CO2 SERPL-SCNC: 25 MMOL/L (ref 20–32)
CREAT SERPL-MCNC: 0.97 MG/DL (ref 0.66–1.25)
GFR SERPL CREATININE-BSD FRML MDRD: 80 ML/MIN/1.7M2
GLUCOSE SERPL-MCNC: 82 MG/DL (ref 70–99)
POTASSIUM SERPL-SCNC: 3.9 MMOL/L (ref 3.4–5.3)
PROT SERPL-MCNC: 7.3 G/DL (ref 6.8–8.8)
SODIUM SERPL-SCNC: 142 MMOL/L (ref 133–144)

## 2019-03-08 ENCOUNTER — OFFICE VISIT (OUTPATIENT)
Dept: SLEEP MEDICINE | Facility: CLINIC | Age: 54
End: 2019-03-08
Payer: COMMERCIAL

## 2019-03-08 VITALS
DIASTOLIC BLOOD PRESSURE: 104 MMHG | RESPIRATION RATE: 16 BRPM | HEART RATE: 61 BPM | BODY MASS INDEX: 35.5 KG/M2 | OXYGEN SATURATION: 96 % | WEIGHT: 248 LBS | HEIGHT: 70 IN | SYSTOLIC BLOOD PRESSURE: 161 MMHG

## 2019-03-08 DIAGNOSIS — G47.33 OSA (OBSTRUCTIVE SLEEP APNEA): Primary | ICD-10-CM

## 2019-03-08 PROCEDURE — 99202 OFFICE O/P NEW SF 15 MIN: CPT | Performed by: INTERNAL MEDICINE

## 2019-03-08 ASSESSMENT — MIFFLIN-ST. JEOR: SCORE: 1966.17

## 2019-03-08 NOTE — PATIENT INSTRUCTIONS
Your BMI is Body mass index is 35.58 kg/m .  Weight management is a personal decision.  If you are interested in exploring weight loss strategies, the following discussion covers the approaches that may be successful. Body mass index (BMI) is one way to tell whether you are at a healthy weight, overweight, or obese. It measures your weight in relation to your height.  A BMI of 18.5 to 24.9 is in the healthy range. A person with a BMI of 25 to 29.9 is considered overweight, and someone with a BMI of 30 or greater is considered obese. More than two-thirds of American adults are considered overweight or obese.  Being overweight or obese increases the risk for further weight gain. Excess weight may lead to heart disease and diabetes.  Creating and following plans for healthy eating and physical activity may help you improve your health.  Weight control is part of healthy lifestyle and includes exercise, emotional health, and healthy eating habits. Careful eating habits lifelong are the mainstay of weight control. Though there are significant health benefits from weight loss, long-term weight loss with diet alone may be very difficult to achieve- studies show long-term success with dietary management in less than 10% of people. Attaining a healthy weight may be especially difficult to achieve in those with severe obesity. In some cases, medications, devices and surgical management might be considered.  What can you do?  If you are overweight or obese and are interested in methods for weight loss, you should discuss this with your provider.     Consider reducing daily calorie intake by 500 calories.     Keep a food journal.     Avoiding skipping meals, consider cutting portions instead.    Diet combined with exercise helps maintain muscle while optimizing fat loss. Strength training is particularly important for building and maintaining muscle mass. Exercise helps reduce stress, increase energy, and improves fitness.  Increasing exercise without diet control, however, may not burn enough calories to loose weight.       Start walking three days a week 10-20 minutes at a time    Work towards walking thirty minutes five days a week     Eventually, increase the speed of your walking for 1-2 minutes at time    In addition, we recommend that you review healthy lifestyles and methods for weight loss available through the National Institutes of Health patient information sites:  http://win.niddk.nih.gov/publications/index.htm    And look into health and wellness programs that may be available through your health insurance provider, employer, local community center, or spencer club.    Weight management plan: Patient was referred to their PCP to discuss a diet and exercise plan.      Eliseo to follow up with Primary Care provider regarding elevated blood pressure.

## 2019-03-08 NOTE — NURSING NOTE
"Chief Complaint   Patient presents with     Sleep Problem     Obtain CPAP supplies       Initial BP (!) 161/104   Pulse 61   Resp 16   Ht 1.778 m (5' 10\")   Wt 112.5 kg (248 lb)   SpO2 96%   BMI 35.58 kg/m   Estimated body mass index is 35.58 kg/m  as calculated from the following:    Height as of this encounter: 1.778 m (5' 10\").    Weight as of this encounter: 112.5 kg (248 lb).    Medication Reconciliation: complete     ESS   Neck 48cm  Tata Marques        "

## 2019-03-08 NOTE — PROGRESS NOTES
Sleep Consultation:    Date on this visit: 3/8/2019    Primary Physician: Clinic, [facility]     Chief complaint: sleep apnea    Presenting History:     Eliseo Oneal presents to clinic to [facility] for his sleep apnea.     He was diagnosed with severe sleep apnea in 2014. HST on 2/17/2014 showed an AHI of 43.9 per hour. He is prescribed auto PAP therapy.     Medical history is significant for hypertension.     Overall, he rates the experience with PAP as 10 (0 poor, 10 great). The mask is comfortable. The mask is not leaking.  He is not snoring with the mask on. He is not having gasp arousals.  He is not having significant oral/nasal dryness. The pressure settings are comfortable.     He uses nasal mask.     He does feel rested in the morning.    Total score - [place]: 5 (12/19/2018  8:26 AM)    Respironics    Auto-PAP 5-15 cmH2O download:  30/30 total days of use. 0 nonuse days.  Average use 5 hours 34 minutes per day.  93% days with >4 hours use.  Large leak 16 secs per day average. CPAP 90% pressure 9.2cm. AHI 0.9      Eliseo goes to sleep at 10:00 PM during the week. He wakes up at 5:00 AM with an alarm. He falls asleep in 5 minutes.  Eliseo denies difficulty falling asleep.  He wakes up 0 times a night. On weekends, Eliseo goes to sleep at 11:00 PM.  He wakes up at 7:00 AM without an alarm. He falls asleep in 5 minutes.  Patient gets an average of 7 hours of sleep per night.     Eliseo does not do shift work.       Patient sleeps on his back and side. He denies no morning dry mouth, morning headaches and restless legs. Eliseo denies any bruxism, sleep walking, sleep talking, dream enactment, sleep paralysis, cataplexy and hypnogogic/hypnopompic hallucinations.    Eliseo naps 0 times per week . He takes no inadvertant naps.  He denies closing eyes, dozing and falling asleep while driving.  Patient was counseled on the importance of driving while alert, to pull over if drowsy, or nap before getting  into the vehicle if sleepy.      He uses 1 cups/day of coffee. Last caffeine intake is usually before noon.    Allergies:    No Known Allergies    Medications:    Current Outpatient Medications   Medication Sig Dispense Refill     lisinopril (PRINIVIL/ZESTRIL) 20 MG tablet Take 1 tablet (20 mg) by mouth daily 90 tablet 1       Problem List:  Patient Active Problem List    Diagnosis Date Noted     Obesity (BMI 35.0-39.9) with comorbidity (H) 09/17/2018     Priority: Medium     Obesity (BMI 30-39.9) 12/19/2016     Priority: Medium     Hypertension goal BP (blood pressure) < 140/90 12/19/2016     Priority: Medium     SCOTT (obstructive sleep apnea) 12/19/2016     Priority: Medium     Ventral hernia without obstruction or gangrene 12/19/2016     Priority: Medium     CARDIOVASCULAR SCREENING; LDL GOAL LESS THAN 160 08/31/2012     Priority: Medium        Past Medical/Surgical History:  Past Medical History:   Diagnosis Date     No active medical problems      Past Surgical History:   Procedure Laterality Date     NO HISTORY OF SURGERY         Social History:  Social History     Socioeconomic History     Marital status:      Spouse name: Not on file     Number of children: Not on file     Years of education: Not on file     Highest education level: Not on file   Occupational History     Not on file   Social Needs     Financial resource strain: Not on file     Food insecurity:     Worry: Not on file     Inability: Not on file     Transportation needs:     Medical: Not on file     Non-medical: Not on file   Tobacco Use     Smoking status: Never Smoker     Smokeless tobacco: Never Used   Substance and Sexual Activity     Alcohol use: No     Drug use: No     Sexual activity: Yes     Partners: Female   Lifestyle     Physical activity:     Days per week: Not on file     Minutes per session: Not on file     Stress: Not on file   Relationships     Social connections:     Talks on phone: Not on file     Gets together: Not on  file     Attends Faith service: Not on file     Active member of club or organization: Not on file     Attends meetings of clubs or organizations: Not on file     Relationship status: Not on file     Intimate partner violence:     Fear of current or ex partner: Not on file     Emotionally abused: Not on file     Physically abused: Not on file     Forced sexual activity: Not on file   Other Topics Concern     Parent/sibling w/ CABG, MI or angioplasty before 65F 55M? No   Social History Narrative     Not on file       Family History:  Family History   Problem Relation Age of Onset     Diabetes Father 58        Type II diabetes     Cerebrovascular Disease Father      Diabetes Paternal Grandmother      Colon Cancer No family hx of      Prostate Cancer No family hx of      Coronary Artery Disease No family hx of      Hypertension No family hx of      Hyperlipidemia No family hx of      Breast Cancer No family hx of      Thyroid Disease No family hx of        Review of Systems:  A complete review of systems reviewed by me is negative with the exeption of what has been mentioned in the history of present illness.  CONSTITUTIONAL: NEGATIVE for weight gain/loss, fever, chills, sweats or night sweats, drug allergies.  EYES: NEGATIVE for changes in vision, blind spots, double vision.  ENT: NEGATIVE for ear pain, sore throat, sinus pain, post-nasal drip, runny nose, bloody nose  CARDIAC: NEGATIVE for fast heartbeats or fluttering in chest, chest pain or pressure, breathlessness when lying flat, swollen legs or swollen feet.  NEUROLOGIC: NEGATIVE headaches, weakness or numbness in the arms or legs.  DERMATOLOGIC: NEGATIVE for rashes, new moles or change in mole(s)  PULMONARY: NEGATIVE SOB at rest, SOB with activity, dry cough, productive cough, coughing up blood, wheezing or whistling when breathing.    GASTROINTESTINAL: NEGATIVE for nausea or vomitting, loose or watery stools, fat or grease in stools, constipation,  "abdominal pain, bowel movements black in color or blood noted.  GENITOURINARY: NEGATIVE for pain during urination, blood in urine, urinating more frequently than usual, irregular menstrual periods.  MUSCULOSKELETAL: NEGATIVE for muscle pain, bone or joint pain, swollen joints.  ENDOCRINE: NEGATIVE for increased thirst or urination, diabetes.  LYMPHATIC: NEGATIVE for swollen lymph nodes, lumps or bumps in the breasts or nipple discharge.    Physical Examination:  Vitals: BP (!) 161/104   Pulse 61   Resp 16   Ht 1.778 m (5' 10\")   Wt 112.5 kg (248 lb)   SpO2 96%   BMI 35.58 kg/m    BMI= Body mass index is 35.58 kg/m .         [place] Total Score 2/14/2014   Total score - [place] 11       TAHIR Total Score: 2 (03/08/19 0907)    GENERAL APPEARANCE: healthy, alert and no distress  EYES: Eyes grossly normal to inspection, PERRL and conjunctivae and sclerae normal  HENT: nose and mouth without ulcers or lesions, oropharynx crowded, uvula elongated, soft palate dependent and tongue base enlarged  NECK: no adenopathy, no asymmetry, masses, or scars and thyroid normal to palpation  RESP: lungs clear to auscultation - no rales, rhonchi or wheezes  CV: regular rates and rhythm, normal S1 S2, no S3 or S4 and no murmur, click or rub  MS: extremities normal- no gross deformities noted  NEURO: Normal strength and tone, mentation intact and speech normal  PSYCH: mentation appears normal and affect normal/bright  Mallampati Class: IV.  Tonsillar Stage: 1  hidden by pillars.    Impression/Plan:    1. Obstructive sleep apnea, severe   2. Hypertension     - Patient is on auto PAP therapy for severe sleep apnea. He uses therapy consistently and he is benefiting form PAP use. Regular compliance and normal AHI is demonstrated on download.     Plan:     1. Continue auto PAP therapy     Obstructive sleep apnea reviewed.  CPAP download reviewed.   Complications of untreated sleep apnea were reviewed.    Dr. Joselito Sarah     CC: No ref. " provider found

## 2019-04-30 DIAGNOSIS — I10 HYPERTENSION GOAL BP (BLOOD PRESSURE) < 140/90: ICD-10-CM

## 2019-04-30 NOTE — TELEPHONE ENCOUNTER
"Requested Prescriptions   Pending Prescriptions Disp Refills     lisinopril (PRINIVIL/ZESTRIL) 20 MG tablet [Pharmacy Med Name: LISINOPRIL 20MG TABS]  Last Written Prescription Date:  9/17/2018  Last Fill Quantity: 90 tablet,  # refills: 1   Last office visit: 9/17/2018 with prescribing provider:  Tha     Future Office Visit:       90 tablet 1     Sig: TAKE ONE TABLET (20 MG) BY MOUTH EVERY DAY       ACE Inhibitors (Including Combos) Protocol Failed - 4/30/2019  4:15 AM        Failed - Blood pressure under 140/90 in past 12 months     BP Readings from Last 3 Encounters:   03/08/19 (!) 161/104   09/17/18 (!) 144/98   11/03/17 130/82             Passed - Recent (12 mo) or future (30 days) visit within the authorizing provider's specialty     Patient had office visit in the last 12 months or has a visit in the next 30 days with authorizing provider or within the authorizing provider's specialty.  See \"Patient Info\" tab in inbasket, or \"Choose Columns\" in Meds & Orders section of the refill encounter.              Passed - Medication is active on med list        Passed - Patient is age 18 or older        Passed - Normal serum creatinine on file in past 12 months     Recent Labs   Lab Test 09/17/18 1815   CR 0.97             Passed - Normal serum potassium on file in past 12 months     Recent Labs   Lab Test 09/17/18 1815   POTASSIUM 3.9             "

## 2019-04-30 NOTE — TELEPHONE ENCOUNTER
Routing refill request to provider for review/approval because:  Out of range:  Blood pressure  Patient needs to be seen because:  Due for HTN follow up  YIFAN RobinsN, RN  [facility]

## 2019-05-02 RX ORDER — LISINOPRIL 20 MG/1
TABLET ORAL
Qty: 30 TABLET | Refills: 0 | Status: SHIPPED | OUTPATIENT
Start: 2019-05-02 | End: 2019-05-31

## 2019-05-02 NOTE — TELEPHONE ENCOUNTER
30 day refill signed. Due for BP follow up. Please assist in scheduling appointment.    Franco Almazan,   5/2/2019 1:27 PM

## 2019-05-31 ENCOUNTER — OFFICE VISIT (OUTPATIENT)
Dept: FAMILY MEDICINE | Facility: CLINIC | Age: 54
End: 2019-05-31
Payer: COMMERCIAL

## 2019-05-31 VITALS
WEIGHT: 247 LBS | DIASTOLIC BLOOD PRESSURE: 94 MMHG | OXYGEN SATURATION: 97 % | BODY MASS INDEX: 35.44 KG/M2 | HEART RATE: 73 BPM | TEMPERATURE: 98.1 F | SYSTOLIC BLOOD PRESSURE: 136 MMHG

## 2019-05-31 DIAGNOSIS — L30.9 ECZEMA, UNSPECIFIED TYPE: ICD-10-CM

## 2019-05-31 DIAGNOSIS — I10 HYPERTENSION GOAL BP (BLOOD PRESSURE) < 140/90: Primary | ICD-10-CM

## 2019-05-31 PROCEDURE — 99214 OFFICE O/P EST MOD 30 MIN: CPT | Performed by: FAMILY MEDICINE

## 2019-05-31 RX ORDER — HYDROCORTISONE VALERATE 2 MG/G
OINTMENT TOPICAL 2 TIMES DAILY
Qty: 60 G | Refills: 3 | Status: SHIPPED | OUTPATIENT
Start: 2019-05-31 | End: 2019-05-31

## 2019-05-31 RX ORDER — LISINOPRIL 20 MG/1
TABLET ORAL
Qty: 90 TABLET | Refills: 1 | Status: SHIPPED | OUTPATIENT
Start: 2019-05-31 | End: 2020-01-05

## 2019-05-31 RX ORDER — HYDROCORTISONE VALERATE 2 MG/G
OINTMENT TOPICAL
Qty: 60 G | Refills: 1 | Status: SHIPPED | OUTPATIENT
Start: 2019-05-31 | End: 2020-10-30

## 2019-05-31 NOTE — PROGRESS NOTES
Subjective     Eliseo Oneal is a 54 year old male who presents to clinic today for the following health issues:    HPI   Hypertension Follow-up      Do you check your blood pressure regularly outside of the clinic? No     Are you following a low salt diet? Yes    Are your blood pressures ever more than 140 on the top number (systolic) OR more   than 90 on the bottom number (diastolic), for example 140/90? Yes    Amount of exercise or physical activity: 1 day/week for an average of 30-45 minutes    Problems taking medications regularly: No    Medication side effects: none    Diet: regular (no restrictions)    Denies any headaches, lightheadedness, dizziness, vision changes, chest pain, palpitations, shortness of breath, dyspnea, numbness/tingling.      Skin rash: dry rough erythematous patch on right elbow. Denies any injury, trauma. No new contacts. Denies any itching or pain. No fevers or chills.      Patient Active Problem List   Diagnosis     CARDIOVASCULAR SCREENING; LDL GOAL LESS THAN 160     Obesity (BMI 30-39.9)     Hypertension goal BP (blood pressure) < 140/90     SCOTT (obstructive sleep apnea)     Ventral hernia without obstruction or gangrene     Obesity (BMI 35.0-39.9) with comorbidity (H)     Past Surgical History:   Procedure Laterality Date     NO HISTORY OF SURGERY         Social History     Tobacco Use     Smoking status: Never Smoker     Smokeless tobacco: Never Used   Substance Use Topics     Alcohol use: No     Family History   Problem Relation Age of Onset     Diabetes Father 58        Type II diabetes     Cerebrovascular Disease Father      Diabetes Paternal Grandmother      Colon Cancer No family hx of      Prostate Cancer No family hx of      Coronary Artery Disease No family hx of      Hypertension No family hx of      Hyperlipidemia No family hx of      Breast Cancer No family hx of      Thyroid Disease No family hx of              Reviewed and updated as needed this visit by  Provider  Tobacco  Allergies  Meds  Problems  Med Hx  Surg Hx  Fam Hx         Review of Systems   ROS COMP: Constitutional, HEENT, cardiovascular, pulmonary, gi and gu systems are negative, except as otherwise noted.      Objective    BP (!) 136/94   Pulse 73   Temp 98.1  F (36.7  C) (Oral)   Wt 112 kg (247 lb)   SpO2 97%   BMI 35.44 kg/m    Body mass index is 35.44 kg/m .  Physical Exam   GENERAL: healthy, alert and no distress  NECK: no adenopathy, no asymmetry, masses, or scars and thyroid normal to palpation  RESP: lungs clear to auscultation - no rales, rhonchi or wheezes  CV: regular rate and rhythm, normal S1 S2, no S3 or S4, no murmur, click or rub, no peripheral edema and peripheral pulses strong  ABDOMEN: soft, nontender, no hepatosplenomegaly, no masses and bowel sounds normal  MS: no gross musculoskeletal defects noted, no edema  SKIN: rough, erythematous patch on right elbow    Diagnostic Test Results:  none         Assessment & Plan     1. Hypertension goal BP (blood pressure) < 140/90: just slightly above goal. He has recently started weight watchers and is monitoring diet closely. Will keep lisinopril at the same dose and he can check blood pressures at home. If persistently above goal, let me know via MyChart. Would then increase lisinopril dose.   - lisinopril (PRINIVIL/ZESTRIL) 20 MG tablet; TAKE ONE TABLET (20 MG) BY MOUTH EVERY DAY  Dispense: 90 tablet; Refill: 1    2. Eczema, unspecified type: start steroid ointment. Use moisturizing cream. Follow up if not improving.  - hydrocortisone valerate (WEST-MICHELINE) 0.2 % external ointment; Apply thin layer of ointment to rash on elbow twice daily for 14 days.  Dispense: 60 g; Refill: 1      Return in about 6 months (around 11/30/2019) for BP Recheck.    Franco Almazan,   [facility]

## 2019-12-19 ENCOUNTER — TELEPHONE (OUTPATIENT)
Dept: FAMILY MEDICINE | Facility: CLINIC | Age: 54
End: 2019-12-19

## 2019-12-19 NOTE — LETTER
[facility]  [address]  [place] [id number] [phone]  December 19, 2019    Eliseo Oneal  [address] [address] [address]    Dear Eliseo,    I care about your health and have reviewed your health plan. I have reviewed your medical conditions, medication list, and lab results and am making recommendations based on this review, to better manage your health.    You are in particular need of attention regarding:  -High Blood Pressure  -Colon Cancer Screening  -Wellness (Physical) Visit    BP Readings from Last 6 Encounters:   05/31/19 (!) 136/94   03/08/19 (!) 161/104   09/17/18 (!) 144/98   11/03/17 130/82   05/12/17 142/82   12/19/16 152/82       I am recommending that you:  -schedule a WELLNESS (Physical) APPOINTMENT with me.   I will check fasting labs the same day - nothing to eat except water and meds for 8-10 hours prior.    -schedule a COLONOSCOPY to look for colon cancer (due every 10 years or 5 years in higher risk situations.)        Colon cancer is now the second leading cause of cancer-related deaths in the [place] for both men and women and there are over 130,000 new cases and 50,000 deaths per year from colon cancer.  Colonoscopies can prevent 90-95% of these deaths.  Problem lesions can be removed before they ever become cancer.  This test is not only looking for cancer, but also getting rid of precancerious lesions.    If you are under/uninsured, we recommend you contact the [employer] program. [employer] is a free colorectal cancer screening program that provides colonoscopies for eligible under/uninsured [place] men and women. If you are interested in receiving a free colonoscopy, please call [employer] at [phone] (mention code [employer]) to see if you re eligible.      If you do not wish to do a colonoscopy or cannot afford to do one, at this time, there is another option. It is called a FIT test or Fecal Immunochemical  Occult Blood Test (take home stool sample kit).  It does not replace the colonoscopy for colorectal cancer screening, but it can detect hidden bleeding in the lower colon.  It does need to be repeated every year and if a positive result is obtained, you would be referred for a colonoscopy.          If you have completed either one of these tests at another facility, please call with the details of when and where the tests were done and if they were normal or not. Or have the records sent to our clinic so that we can best coordinate your care.      Here is a list of Health Maintenance topics that are due now or due soon:  Health Maintenance Due   Topic Date Due     ADVANCE CARE PLANNING  1965     COLONOSCOPY  04/14/1973     HIV SCREENING  04/14/1978     ZOSTER IMMUNIZATION (1 of 2) 04/14/2013     PREVENTIVE CARE VISIT  12/19/2017     MICROALBUMIN  11/03/2018     PHQ-2  01/01/2019     INFLUENZA VACCINE (1) 09/01/2019       Please call us at [phone] (or use Virtual DBS) to address the above recommendations.     Thank you for trusting [facility] and we appreciate the opportunity to serve you.  We look forward to supporting your healthcare needs in the future.    Healthy Regards,    [facility] Care Team

## 2019-12-19 NOTE — TELEPHONE ENCOUNTER
Needs of attention regarding:  -High Blood Pressure  -Colon Cancer Screening  -Wellness (Physical) Visit     Health Maintenance Topics with due status: Overdue       Topic Date Due    ADVANCE CARE PLANNING 1965    COLONOSCOPY 04/14/1973    HIV SCREENING 04/14/1978    ZOSTER IMMUNIZATION 04/14/2013    PREVENTIVE CARE VISIT 12/19/2017    MICROALBUMIN 11/03/2018    PHQ-2 01/01/2019    INFLUENZA VACCINE 09/01/2019       Communication:  See Letter

## 2019-12-31 DIAGNOSIS — I10 HYPERTENSION GOAL BP (BLOOD PRESSURE) < 140/90: ICD-10-CM

## 2019-12-31 NOTE — TELEPHONE ENCOUNTER
Routing refill request to provider for review/approval because:  Out of range:  Blood pressure  Labs not current:  Creatinine, potassium  YIFAN RobinsN, RN  [facility]

## 2019-12-31 NOTE — LETTER
[facility]  [address]  [facility] [place] [id number] [phone]  January 15, 2020    Eliseo Oneal  [address] [address] [address]    Dear Eliseo,    We received a refill request from your pharmacy for your blood pressure medication.  At this time the nurses were able to give you a brandon refill, but you are due to be seen for a blood pressure follow up before the next refill.  This appointment can be scheduled by calling [phone] or can be scheduled via crossvertiset as well.    Thank you for choosing [facility] Janis Keenan

## 2019-12-31 NOTE — TELEPHONE ENCOUNTER
"Requested Prescriptions   Pending Prescriptions Disp Refills     lisinopril (PRINIVIL/ZESTRIL) 20 MG tablet [Pharmacy Med Name: LISINOPRIL  Last Written Prescription Date:  5/31/19  Last Fill Quantity: 90,  # refills: 1   Last Office Visit: 5/31/2019   Future Office Visit:      20MG TABS] 90 tablet 1     Sig: TAKE ONE TABLET BY MOUTH EVERY DAY       ACE Inhibitors (Including Combos) Protocol Failed - 12/31/2019  4:25 AM        Failed - Blood pressure under 140/90 in past 12 months     BP Readings from Last 3 Encounters:   05/31/19 (!) 136/94   03/08/19 (!) 161/104   09/17/18 (!) 144/98           Failed - Normal serum creatinine on file in past 12 months     Recent Labs   Lab Test 09/17/18  1815   CR 0.97           Failed - Normal serum potassium on file in past 12 months     Recent Labs   Lab Test 09/17/18  1815   POTASSIUM 3.9           Passed - Recent (12 mo) or future (30 days) visit within the authorizing provider's specialty     Patient has had an office visit with the authorizing provider or a provider within the authorizing providers department within the previous 12 mos or has a future within next 30 days. See \"Patient Info\" tab in inbasket, or \"Choose Columns\" in Meds & Orders section of the refill encounter.            Passed - Medication is active on med list        Passed - Patient is age 18 or older          "

## 2020-01-05 RX ORDER — LISINOPRIL 20 MG/1
TABLET ORAL
Qty: 30 TABLET | Refills: 0 | Status: SHIPPED | OUTPATIENT
Start: 2020-01-05 | End: 2020-02-05

## 2020-01-06 NOTE — TELEPHONE ENCOUNTER
30 day refill signed. He is due for BP recheck/preventive visit and labs. Future lab orders placed.     Franco Almazan,   1/5/2020 8:26 PM

## 2020-01-08 NOTE — TELEPHONE ENCOUNTER
Called pt at [phone] and left non detailed message to call back.  See below.  Due for physical.  Can do labs ahead of time as we have orders on file for this.  Janis Carter

## 2020-01-15 NOTE — TELEPHONE ENCOUNTER
2nd atttempt.  Pt's voicemail is now full so I cannot leave a message.  Letter mailed.  Janis Carter

## 2020-02-02 DIAGNOSIS — I10 HYPERTENSION GOAL BP (BLOOD PRESSURE) < 140/90: ICD-10-CM

## 2020-02-03 NOTE — TELEPHONE ENCOUNTER
"Requested Prescriptions   Pending Prescriptions Disp Refills     lisinopril (PRINIVIL/ZESTRIL) 20 MG tablet [Pharmacy Med Name: LISINOPRIL 20MG TABS]  Last Written Prescription Date:  1/5/2020  Last Fill Quantity: 30 tablet,  # refills: 0   Last office visit: 5/31/2019 with prescribing provider:  Tha     Future Office Visit:       30 tablet 0     Sig: TAKE ONE TABLET BY MOUTH EVERY DAY       ACE Inhibitors (Including Combos) Protocol Failed - 2/2/2020  4:10 AM        Failed - Blood pressure under 140/90 in past 12 months     BP Readings from Last 3 Encounters:   05/31/19 (!) 136/94   03/08/19 (!) 161/104   09/17/18 (!) 144/98                 Failed - Normal serum creatinine on file in past 12 months     Recent Labs   Lab Test 09/17/18  1815   CR 0.97             Failed - Normal serum potassium on file in past 12 months     Recent Labs   Lab Test 09/17/18  1815   POTASSIUM 3.9             Passed - Recent (12 mo) or future (30 days) visit within the authorizing provider's specialty     Patient has had an office visit with the authorizing provider or a provider within the authorizing providers department within the previous 12 mos or has a future within next 30 days. See \"Patient Info\" tab in inbasket, or \"Choose Columns\" in Meds & Orders section of the refill encounter.              Passed - Medication is active on med list        Passed - Patient is age 18 or older        "

## 2020-02-04 NOTE — TELEPHONE ENCOUNTER
Routing refill request to provider for review/approval because:  Out of range:  Blood pressure  Labs not current:  Creatinine and potassium  Patient needs to be seen because:  Due for HTN follow up  YIFAN RobinsN, RN  [facility]

## 2020-02-05 RX ORDER — LISINOPRIL 20 MG/1
TABLET ORAL
Qty: 30 TABLET | Refills: 0 | Status: SHIPPED | OUTPATIENT
Start: 2020-02-05 | End: 2020-02-21

## 2020-02-06 NOTE — TELEPHONE ENCOUNTER
30 day refill signed. Eliseo is due for follow up on BP. Please help him schedule OV.    Franco Almazan,   2/5/2020 11:24 PM

## 2020-02-11 NOTE — TELEPHONE ENCOUNTER
Called [phone] and left non detailed message for pt to call back.  See below.  Due to HTN follow up with PCP.  Janis Carter

## 2020-02-17 ENCOUNTER — HOSPITAL ENCOUNTER (OUTPATIENT)
Facility: CLINIC | Age: 55
End: 2020-02-17
Attending: INTERNAL MEDICINE | Admitting: INTERNAL MEDICINE
Payer: COMMERCIAL

## 2020-02-20 NOTE — PROGRESS NOTES
Subjective     Eliseo Oneal is a 54 year old male who presents to clinic today for the following health issues:    HPI   Hypertension Follow-up      Do you check your blood pressure regularly outside of the clinic? Yes     Are you following a low salt diet? No    Are your blood pressures ever more than 140 on the top number (systolic) OR more   than 90 on the bottom number (diastolic), for example 140/90? Yes 170/112, 160/106, 162/107, 174/105, 155/110      How many servings of fruits and vegetables do you eat daily?  2-3    On average, how many sweetened beverages do you drink each day (Examples: soda, juice, sweet tea, etc.  Do NOT count diet or artificially sweetened beverages)?   1    How many days per week do you exercise enough to make your heart beat faster? 3 or less    How many minutes a day do you exercise enough to make your heart beat faster? 30 - 60    How many days per week do you miss taking your medication? 0    Denies any headaches, lightheadedness, dizziness, vision changes, chest pain, palpitations, shortness of breath, dyspnea, numbness/tingling.      Patient Active Problem List   Diagnosis     CARDIOVASCULAR SCREENING; LDL GOAL LESS THAN 160     Obesity (BMI 30-39.9)     Hypertension goal BP (blood pressure) < 140/90     SCOTT (obstructive sleep apnea)     Ventral hernia without obstruction or gangrene     Obesity (BMI 35.0-39.9) with comorbidity (H)     Past Surgical History:   Procedure Laterality Date     NO HISTORY OF SURGERY         Social History     Tobacco Use     Smoking status: Never Smoker     Smokeless tobacco: Never Used   Substance Use Topics     Alcohol use: No     Family History   Problem Relation Age of Onset     Diabetes Father 58        Type II diabetes     Cerebrovascular Disease Father      Diabetes Paternal Grandmother      Colon Cancer No family hx of      Prostate Cancer No family hx of      Coronary Artery Disease No family hx of      Hypertension No family hx of       "Hyperlipidemia No family hx of      Breast Cancer No family hx of      Thyroid Disease No family hx of            Reviewed and updated as needed this visit by Provider         Review of Systems   ROS COMP: Constitutional, HEENT, cardiovascular, pulmonary, gi and gu systems are negative, except as otherwise noted.      Objective    BP (!) 146/98   Pulse 87   Temp 98.1  F (36.7  C) (Oral)   Ht 1.778 m (5' 10\")   Wt 115.7 kg (255 lb)   SpO2 96%   BMI 36.59 kg/m    Body mass index is 36.59 kg/m .  Physical Exam   GENERAL: healthy, alert and no distress  NECK: no adenopathy and no asymmetry, masses, or scars  RESP: lungs clear to auscultation - no rales, rhonchi or wheezes  CV: regular rate and rhythm, normal S1 S2, no S3 or S4, no murmur, click or rub, no peripheral edema and peripheral pulses strong  MS: no gross musculoskeletal defects noted, no edema  PSYCH: mentation appears normal, affect normal/bright    Diagnostic Test Results:  Labs reviewed in Epic        Assessment & Plan   1. Hypertension goal BP (blood pressure) < 140/90: BP above goal. Will icnrease lisinopril to 40 mg daily. Recheck labs today.  - Albumin Random Urine Quantitative with Creat Ratio; Future  - **Basic metabolic panel FUTURE anytime; Future  - lisinopril 40 MG PO tablet; Take 1 tablet (40 mg) by mouth    2. Screening for prostate cancer: Discussed controversies surrounding PSA. Specifically reviewed 2017 USPSTF findings recommending discussion of PSA testing for men ages 55-69.  Reviewed findings of the  Randomized Study of Screening for Prostate Cancer which showed a 30% reduction in advanced stage prostate cancer and a 20% reduction in death rate from prostate cancer in this age group. PSA-based screening may prevent up to 2 deaths and up to 3 cases of metastatic disease per 1,000 men screened over 13 years.  We've elected to do PSA this year after discussing these controversies.  - PSA, screen; Future    3. Screening for " diabetes mellitus  - **Basic metabolic panel FUTURE anytime; Future    4. Screening for hyperlipidemia  - Lipid panel reflex to direct LDL Fasting; Future    5. Rash and nonspecific skin eruption: stable, continue steroid ointment as needed.  - clobetasol 0.05 % EX external ointment; Apply ointment to affected areas on elbows twice daily x 14 days.  Dispense: 60 g; Refill: 0      Return in about 2 weeks (around 3/6/2020) for BP.    Franco Almazan,   [facility]

## 2020-02-21 ENCOUNTER — OFFICE VISIT (OUTPATIENT)
Dept: FAMILY MEDICINE | Facility: CLINIC | Age: 55
End: 2020-02-21
Payer: COMMERCIAL

## 2020-02-21 VITALS
HEART RATE: 87 BPM | DIASTOLIC BLOOD PRESSURE: 98 MMHG | HEIGHT: 70 IN | SYSTOLIC BLOOD PRESSURE: 146 MMHG | OXYGEN SATURATION: 96 % | TEMPERATURE: 98.1 F | WEIGHT: 255 LBS | BODY MASS INDEX: 36.51 KG/M2

## 2020-02-21 DIAGNOSIS — Z13.1 SCREENING FOR DIABETES MELLITUS: ICD-10-CM

## 2020-02-21 DIAGNOSIS — Z13.220 SCREENING FOR HYPERLIPIDEMIA: ICD-10-CM

## 2020-02-21 DIAGNOSIS — Z12.5 SCREENING FOR PROSTATE CANCER: ICD-10-CM

## 2020-02-21 DIAGNOSIS — I10 HYPERTENSION GOAL BP (BLOOD PRESSURE) < 140/90: Primary | ICD-10-CM

## 2020-02-21 DIAGNOSIS — R21 RASH AND NONSPECIFIC SKIN ERUPTION: ICD-10-CM

## 2020-02-21 PROCEDURE — 99213 OFFICE O/P EST LOW 20 MIN: CPT | Performed by: FAMILY MEDICINE

## 2020-02-21 RX ORDER — LISINOPRIL 40 MG/1
40 TABLET ORAL
COMMUNITY
Start: 2020-02-21 | End: 2020-10-30

## 2020-02-21 RX ORDER — CLOBETASOL PROPIONATE 0.5 MG/G
OINTMENT TOPICAL
Qty: 60 G | Refills: 0 | Status: SHIPPED | OUTPATIENT
Start: 2020-02-21 | End: 2020-10-30

## 2020-02-21 ASSESSMENT — MIFFLIN-ST. JEOR: SCORE: 1992.92

## 2020-02-21 NOTE — PATIENT INSTRUCTIONS
In 2 weeks, let me know how your blood pressures are and how you are feeling. Send a message on [employer]

## 2020-02-26 DIAGNOSIS — I10 HYPERTENSION GOAL BP (BLOOD PRESSURE) < 140/90: ICD-10-CM

## 2020-02-26 DIAGNOSIS — Z12.5 SCREENING FOR PROSTATE CANCER: ICD-10-CM

## 2020-02-26 LAB
ANION GAP SERPL CALCULATED.3IONS-SCNC: 3 MMOL/L (ref 3–14)
BUN SERPL-MCNC: 10 MG/DL (ref 7–30)
CALCIUM SERPL-MCNC: 8.4 MG/DL (ref 8.5–10.1)
CHLORIDE SERPL-SCNC: 111 MMOL/L (ref 94–109)
CHOLEST SERPL-MCNC: 164 MG/DL
CO2 SERPL-SCNC: 26 MMOL/L (ref 20–32)
CREAT SERPL-MCNC: 0.91 MG/DL (ref 0.66–1.25)
GFR SERPL CREATININE-BSD FRML MDRD: >90 ML/MIN/{1.73_M2}
GLUCOSE SERPL-MCNC: 113 MG/DL (ref 70–99)
HDLC SERPL-MCNC: 39 MG/DL
LDLC SERPL CALC-MCNC: 105 MG/DL
NONHDLC SERPL-MCNC: 125 MG/DL
POTASSIUM SERPL-SCNC: 3.7 MMOL/L (ref 3.4–5.3)
PSA SERPL-ACNC: 1.81 UG/L (ref 0–4)
SODIUM SERPL-SCNC: 140 MMOL/L (ref 133–144)
TRIGL SERPL-MCNC: 102 MG/DL

## 2020-02-26 PROCEDURE — 80048 BASIC METABOLIC PNL TOTAL CA: CPT | Performed by: FAMILY MEDICINE

## 2020-02-26 PROCEDURE — 82043 UR ALBUMIN QUANTITATIVE: CPT | Performed by: FAMILY MEDICINE

## 2020-02-26 PROCEDURE — 80061 LIPID PANEL: CPT | Performed by: FAMILY MEDICINE

## 2020-02-26 PROCEDURE — G0103 PSA SCREENING: HCPCS | Performed by: FAMILY MEDICINE

## 2020-02-26 PROCEDURE — 36415 COLL VENOUS BLD VENIPUNCTURE: CPT | Performed by: FAMILY MEDICINE

## 2020-02-27 LAB
CREAT UR-MCNC: 159 MG/DL
MICROALBUMIN UR-MCNC: 12 MG/L
MICROALBUMIN/CREAT UR: 7.86 MG/G CR (ref 0–17)

## 2020-03-04 DIAGNOSIS — I10 HYPERTENSION GOAL BP (BLOOD PRESSURE) < 140/90: ICD-10-CM

## 2020-03-04 NOTE — TELEPHONE ENCOUNTER
Routing refill request to provider for review/approval because:  Dose was increased for medication Lisinopril.   Please address refill. Labeled as historical in medication list for 40mg. Please change dose and directions for patient. Thank you.     Nia eWldon RN, BSN  [facility] [place]

## 2020-03-04 NOTE — TELEPHONE ENCOUNTER
"Requested Prescriptions   Pending Prescriptions Disp Refills     lisinopril (ZESTRIL) 20 MG tablet [Pharmacy Med Name: LISINOPRIL 20MG TABS]  Discontinued.  Patient taking new dose, but needs new Rx for 40 mg.  Last Written Prescription Date:  2/5/2020  Last Fill Quantity: 30 tablet,  # refills: 0   Last office visit: 2/21/2020 with prescribing provider:  Tha     Future Office Visit:       30 tablet 0     Sig: TAKE ONE TABLET BY MOUTH EVERY DAY       ACE Inhibitors (Including Combos) Protocol Failed - 3/4/2020  4:13 AM        Failed - Blood pressure under 140/90 in past 12 months     BP Readings from Last 3 Encounters:   02/21/20 (!) 146/98   05/31/19 (!) 136/94   03/08/19 (!) 161/104                 Passed - Recent (12 mo) or future (30 days) visit within the authorizing provider's specialty     Patient has had an office visit with the authorizing provider or a provider within the authorizing providers department within the previous 12 mos or has a future within next 30 days. See \"Patient Info\" tab in inbasket, or \"Choose Columns\" in Meds & Orders section of the refill encounter.              Passed - Medication is active on med list        Passed - Patient is age 18 or older        Passed - Normal serum creatinine on file in past 12 months     Recent Labs   Lab Test 02/26/20  0825   CR 0.91             Passed - Normal serum potassium on file in past 12 months     Recent Labs   Lab Test 02/26/20  0825   POTASSIUM 3.7             "

## 2020-03-05 RX ORDER — LISINOPRIL 40 MG/1
40 TABLET ORAL DAILY
Qty: 90 TABLET | Refills: 1 | Status: SHIPPED | OUTPATIENT
Start: 2020-03-05 | End: 2020-10-01

## 2020-03-05 NOTE — TELEPHONE ENCOUNTER
Updated dose to 40 mg.  Chart reviewed.  Rx sent to pt's preferred pharmacy.       [id number] [place] [facility] - [place], [place] - [address] [address] [address] [facility] [place], [place] - [address] [facility]    Clarke Gonzales MD

## 2020-03-22 ENCOUNTER — HEALTH MAINTENANCE LETTER (OUTPATIENT)
Age: 55
End: 2020-03-22

## 2020-03-24 ENCOUNTER — VIRTUAL VISIT (OUTPATIENT)
Dept: SLEEP MEDICINE | Facility: CLINIC | Age: 55
End: 2020-03-24
Payer: COMMERCIAL

## 2020-03-24 VITALS — HEIGHT: 70 IN | BODY MASS INDEX: 35.93 KG/M2 | WEIGHT: 251 LBS

## 2020-03-24 DIAGNOSIS — G47.33 OSA (OBSTRUCTIVE SLEEP APNEA): Primary | ICD-10-CM

## 2020-03-24 PROCEDURE — 99213 OFFICE O/P EST LOW 20 MIN: CPT | Performed by: INTERNAL MEDICINE

## 2020-03-24 ASSESSMENT — MIFFLIN-ST. JEOR: SCORE: 1974.78

## 2020-03-24 NOTE — PATIENT INSTRUCTIONS
Your Body mass index is 36.01 kg/m .  Weight management is a personal decision.  If you are interested in exploring weight loss strategies, the following discussion covers the approaches that may be successful. Body mass index (BMI) is one way to tell whether you are at a healthy weight, overweight, or obese. It measures your weight in relation to your height.  A BMI of 18.5 to 24.9 is in the healthy range. A person with a BMI of 25 to 29.9 is considered overweight, and someone with a BMI of 30 or greater is considered obese. More than two-thirds of American adults are considered overweight or obese.  Being overweight or obese increases the risk for further weight gain. Excess weight may lead to heart disease and diabetes.  Creating and following plans for healthy eating and physical activity may help you improve your health.  Weight control is part of healthy lifestyle and includes exercise, emotional health, and healthy eating habits. Careful eating habits lifelong are the mainstay of weight control. Though there are significant health benefits from weight loss, long-term weight loss with diet alone may be very difficult to achieve- studies show long-term success with dietary management in less than 10% of people. Attaining a healthy weight may be especially difficult to achieve in those with severe obesity. In some cases, medications, devices and surgical management might be considered.  What can you do?  If you are overweight or obese and are interested in methods for weight loss, you should discuss this with your provider.     Consider reducing daily calorie intake by 500 calories.     Keep a food journal.     Avoiding skipping meals, consider cutting portions instead.    Diet combined with exercise helps maintain muscle while optimizing fat loss. Strength training is particularly important for building and maintaining muscle mass. Exercise helps reduce stress, increase energy, and improves fitness.  Increasing exercise without diet control, however, may not burn enough calories to loose weight.       Start walking three days a week 10-20 minutes at a time    Work towards walking thirty minutes five days a week     Eventually, increase the speed of your walking for 1-2 minutes at time    In addition, we recommend that you review healthy lifestyles and methods for weight loss available through the National Institutes of Health patient information sites:  http://win.niddk.nih.gov/publications/index.htm    And look into health and wellness programs that may be available through your health insurance provider, employer, local community center, or spencer club.

## 2020-03-24 NOTE — PROGRESS NOTES
"  Phone Obstructive Sleep Apnea - PAP Follow-Up Visit:    Chief Complaint   Patient presents with     CPAP Follow Up       Eliseo Oneal comes in today for follow-up of their severe sleep apnea, managed with CPAP.     He was diagnosed with severe sleep apnea in 2014. HST on 2/17/2014 showed an AHI of 43.9 per hour. He is prescribed auto PAP therapy.      Medical history is significant for hypertension.     Overall, he rates the experience with PAP as 10 (0 poor, 10 great). The mask is comfortable. The mask is not leaking.  He is not snoring with the mask on. He is not having gasp arousals.  He is not having significant oral/nasal dryness. The pressure settings are comfortable.     He uses nasal mask.     Bedtime is typically 10:30 pm. Usually it takes about 10 minutes to fall asleep with the mask on. Wake time is typically 7 am.  Patient is using PAP therapy 7 hours per night. The patient is usually getting 7-8 hours of sleep per night.    He does feel rested in the morning.    Respironics    Auto-PAP 5-15 cmH2O download:  Remote download not available.         Reviewed by team: Tobacco  Allergies  Meds  Soc Hx      Reviewed by provider:        Problem List:  Patient Active Problem List    Diagnosis Date Noted     Obesity (BMI 35.0-39.9) with comorbidity (H) 09/17/2018     Priority: Medium     Obesity (BMI 30-39.9) 12/19/2016     Priority: Medium     Hypertension goal BP (blood pressure) < 140/90 12/19/2016     Priority: Medium     SCOTT (obstructive sleep apnea) 12/19/2016     Priority: Medium     Ventral hernia without obstruction or gangrene 12/19/2016     Priority: Medium     CARDIOVASCULAR SCREENING; LDL GOAL LESS THAN 160 08/31/2012     Priority: Medium          Ht 1.778 m (5' 10\")   Wt 113.9 kg (251 lb)   BMI 36.01 kg/m      Impression/Plan:     Severe Obstructive sleep apnea.      Tolerating PAP well. Daytime symptoms are stable.    Plan:     1. Continue auto PAP therapy     Eliseo Oneal will " follow up in about 1 year(s).     Eight minutes spent with patient, all of which were spent  counseling, consulting, coordinating plan of care.      Joselito Sarah MD, MD    CC:  Cl[facility], [facility],

## 2020-09-25 NOTE — NURSING NOTE
1 yr reminder card will be mailed to patient.  Mery Ortega [facility]

## 2020-09-28 DIAGNOSIS — I10 HYPERTENSION GOAL BP (BLOOD PRESSURE) < 140/90: ICD-10-CM

## 2020-10-01 RX ORDER — LISINOPRIL 40 MG/1
40 TABLET ORAL DAILY
Qty: 90 TABLET | Refills: 1 | Status: SHIPPED | OUTPATIENT
Start: 2020-10-01 | End: 2021-04-26

## 2020-10-30 ENCOUNTER — HOSPITAL ENCOUNTER (INPATIENT)
Facility: CLINIC | Age: 55
LOS: 2 days | Discharge: OTHER INSTITUTION | DRG: 195 | End: 2020-11-01
Attending: EMERGENCY MEDICINE | Admitting: INTERNAL MEDICINE
Payer: COMMERCIAL

## 2020-10-30 ENCOUNTER — APPOINTMENT (OUTPATIENT)
Dept: CT IMAGING | Facility: CLINIC | Age: 55
DRG: 195 | End: 2020-10-30
Attending: EMERGENCY MEDICINE
Payer: COMMERCIAL

## 2020-10-30 DIAGNOSIS — Z20.822 SUSPECTED COVID-19 VIRUS INFECTION: ICD-10-CM

## 2020-10-30 DIAGNOSIS — J18.9 MULTIFOCAL PNEUMONIA: ICD-10-CM

## 2020-10-30 PROBLEM — J98.4 PNEUMONITIS: Status: ACTIVE | Noted: 2020-10-30

## 2020-10-30 LAB
ANION GAP SERPL CALCULATED.3IONS-SCNC: 5 MMOL/L (ref 3–14)
BASOPHILS # BLD AUTO: 0 10E9/L (ref 0–0.2)
BASOPHILS NFR BLD AUTO: 0.2 %
BUN SERPL-MCNC: 28 MG/DL (ref 7–30)
CALCIUM SERPL-MCNC: 8.2 MG/DL (ref 8.5–10.1)
CHLORIDE SERPL-SCNC: 105 MMOL/L (ref 94–109)
CO2 SERPL-SCNC: 27 MMOL/L (ref 20–32)
CREAT SERPL-MCNC: 1.21 MG/DL (ref 0.66–1.25)
CREAT SERPL-MCNC: 1.23 MG/DL (ref 0.66–1.25)
CRP SERPL-MCNC: 88.3 MG/L (ref 0–8)
D DIMER PPP FEU-MCNC: 2.2 UG/ML FEU (ref 0–0.5)
DIFFERENTIAL METHOD BLD: ABNORMAL
EOSINOPHIL # BLD AUTO: 0 10E9/L (ref 0–0.7)
EOSINOPHIL NFR BLD AUTO: 0.2 %
ERYTHROCYTE [DISTWIDTH] IN BLOOD BY AUTOMATED COUNT: 12.8 % (ref 10–15)
GFR SERPL CREATININE-BSD FRML MDRD: 65 ML/MIN/{1.73_M2}
GFR SERPL CREATININE-BSD FRML MDRD: 66 ML/MIN/{1.73_M2}
GLUCOSE SERPL-MCNC: 131 MG/DL (ref 70–99)
HCT VFR BLD AUTO: 45.1 % (ref 40–53)
HGB BLD-MCNC: 15.2 G/DL (ref 13.3–17.7)
IMM GRANULOCYTES # BLD: 0 10E9/L (ref 0–0.4)
IMM GRANULOCYTES NFR BLD: 0.4 %
LACTATE BLD-SCNC: 1 MMOL/L (ref 0.7–2)
LYMPHOCYTES # BLD AUTO: 0.6 10E9/L (ref 0.8–5.3)
LYMPHOCYTES NFR BLD AUTO: 13.6 %
MCH RBC QN AUTO: 29.9 PG (ref 26.5–33)
MCHC RBC AUTO-ENTMCNC: 33.7 G/DL (ref 31.5–36.5)
MCV RBC AUTO: 89 FL (ref 78–100)
MONOCYTES # BLD AUTO: 0.3 10E9/L (ref 0–1.3)
MONOCYTES NFR BLD AUTO: 6.9 %
NEUTROPHILS # BLD AUTO: 3.5 10E9/L (ref 1.6–8.3)
NEUTROPHILS NFR BLD AUTO: 78.7 %
NRBC # BLD AUTO: 0 10*3/UL
NRBC BLD AUTO-RTO: 0 /100
PLATELET # BLD AUTO: 170 10E9/L (ref 150–450)
POTASSIUM SERPL-SCNC: 3.6 MMOL/L (ref 3.4–5.3)
RBC # BLD AUTO: 5.08 10E12/L (ref 4.4–5.9)
SARS-COV-2 RNA SPEC QL NAA+PROBE: NORMAL
SODIUM SERPL-SCNC: 137 MMOL/L (ref 133–144)
SPECIMEN SOURCE: NORMAL
WBC # BLD AUTO: 4.5 10E9/L (ref 4–11)

## 2020-10-30 PROCEDURE — 250N000013 HC RX MED GY IP 250 OP 250 PS 637: Performed by: INTERNAL MEDICINE

## 2020-10-30 PROCEDURE — 250N000011 HC RX IP 250 OP 636: Performed by: EMERGENCY MEDICINE

## 2020-10-30 PROCEDURE — 96374 THER/PROPH/DIAG INJ IV PUSH: CPT

## 2020-10-30 PROCEDURE — 250N000011 HC RX IP 250 OP 636: Performed by: INTERNAL MEDICINE

## 2020-10-30 PROCEDURE — 250N000013 HC RX MED GY IP 250 OP 250 PS 637: Performed by: EMERGENCY MEDICINE

## 2020-10-30 PROCEDURE — 85025 COMPLETE CBC W/AUTO DIFF WBC: CPT | Performed by: EMERGENCY MEDICINE

## 2020-10-30 PROCEDURE — 83605 ASSAY OF LACTIC ACID: CPT | Performed by: EMERGENCY MEDICINE

## 2020-10-30 PROCEDURE — 250N000009 HC RX 250: Performed by: EMERGENCY MEDICINE

## 2020-10-30 PROCEDURE — 82565 ASSAY OF CREATININE: CPT | Performed by: INTERNAL MEDICINE

## 2020-10-30 PROCEDURE — 36415 COLL VENOUS BLD VENIPUNCTURE: CPT | Performed by: INTERNAL MEDICINE

## 2020-10-30 PROCEDURE — C9803 HOPD COVID-19 SPEC COLLECT: HCPCS

## 2020-10-30 PROCEDURE — U0003 INFECTIOUS AGENT DETECTION BY NUCLEIC ACID (DNA OR RNA); SEVERE ACUTE RESPIRATORY SYNDROME CORONAVIRUS 2 (SARS-COV-2) (CORONAVIRUS DISEASE [COVID-19]), AMPLIFIED PROBE TECHNIQUE, MAKING USE OF HIGH THROUGHPUT TECHNOLOGIES AS DESCRIBED BY CMS-2020-01-R: HCPCS | Performed by: EMERGENCY MEDICINE

## 2020-10-30 PROCEDURE — 71275 CT ANGIOGRAPHY CHEST: CPT

## 2020-10-30 PROCEDURE — 250N000012 HC RX MED GY IP 250 OP 636 PS 637: Performed by: INTERNAL MEDICINE

## 2020-10-30 PROCEDURE — 96375 TX/PRO/DX INJ NEW DRUG ADDON: CPT

## 2020-10-30 PROCEDURE — 99223 1ST HOSP IP/OBS HIGH 75: CPT | Mod: AI | Performed by: INTERNAL MEDICINE

## 2020-10-30 PROCEDURE — 86140 C-REACTIVE PROTEIN: CPT | Performed by: INTERNAL MEDICINE

## 2020-10-30 PROCEDURE — 85379 FIBRIN DEGRADATION QUANT: CPT | Performed by: EMERGENCY MEDICINE

## 2020-10-30 PROCEDURE — 96361 HYDRATE IV INFUSION ADD-ON: CPT

## 2020-10-30 PROCEDURE — 80048 BASIC METABOLIC PNL TOTAL CA: CPT | Performed by: EMERGENCY MEDICINE

## 2020-10-30 PROCEDURE — 258N000003 HC RX IP 258 OP 636: Performed by: EMERGENCY MEDICINE

## 2020-10-30 PROCEDURE — 99285 EMERGENCY DEPT VISIT HI MDM: CPT | Mod: 25

## 2020-10-30 PROCEDURE — 83520 IMMUNOASSAY QUANT NOS NONAB: CPT | Performed by: INTERNAL MEDICINE

## 2020-10-30 PROCEDURE — 120N000001 HC R&B MED SURG/OB

## 2020-10-30 RX ORDER — AMOXICILLIN 250 MG
1 CAPSULE ORAL 2 TIMES DAILY
Status: DISCONTINUED | OUTPATIENT
Start: 2020-10-30 | End: 2020-11-01 | Stop reason: HOSPADM

## 2020-10-30 RX ORDER — LISINOPRIL 40 MG/1
40 TABLET ORAL DAILY
Status: DISCONTINUED | OUTPATIENT
Start: 2020-10-31 | End: 2020-11-01 | Stop reason: HOSPADM

## 2020-10-30 RX ORDER — MORPHINE SULFATE 2 MG/ML
1 INJECTION, SOLUTION INTRAMUSCULAR; INTRAVENOUS
Status: DISCONTINUED | OUTPATIENT
Start: 2020-10-30 | End: 2020-11-01 | Stop reason: HOSPADM

## 2020-10-30 RX ORDER — LIDOCAINE 40 MG/G
CREAM TOPICAL
Status: DISCONTINUED | OUTPATIENT
Start: 2020-10-30 | End: 2020-11-01 | Stop reason: HOSPADM

## 2020-10-30 RX ORDER — ACETAMINOPHEN 325 MG/1
650 TABLET ORAL EVERY 4 HOURS PRN
Status: DISCONTINUED | OUTPATIENT
Start: 2020-10-30 | End: 2020-11-01 | Stop reason: HOSPADM

## 2020-10-30 RX ORDER — ONDANSETRON 2 MG/ML
4 INJECTION INTRAMUSCULAR; INTRAVENOUS EVERY 6 HOURS PRN
Status: DISCONTINUED | OUTPATIENT
Start: 2020-10-30 | End: 2020-11-01 | Stop reason: HOSPADM

## 2020-10-30 RX ORDER — ALBUTEROL SULFATE 90 UG/1
2 AEROSOL, METERED RESPIRATORY (INHALATION) 4 TIMES DAILY
Status: DISCONTINUED | OUTPATIENT
Start: 2020-10-30 | End: 2020-11-01 | Stop reason: HOSPADM

## 2020-10-30 RX ORDER — AMOXICILLIN 250 MG
2 CAPSULE ORAL 2 TIMES DAILY
Status: DISCONTINUED | OUTPATIENT
Start: 2020-10-30 | End: 2020-11-01 | Stop reason: HOSPADM

## 2020-10-30 RX ORDER — ACETAMINOPHEN 500 MG
1000 TABLET ORAL ONCE
Status: COMPLETED | OUTPATIENT
Start: 2020-10-30 | End: 2020-10-30

## 2020-10-30 RX ORDER — AMOXICILLIN 250 MG
1 CAPSULE ORAL 2 TIMES DAILY PRN
Status: DISCONTINUED | OUTPATIENT
Start: 2020-10-30 | End: 2020-11-01 | Stop reason: HOSPADM

## 2020-10-30 RX ORDER — HYDROCODONE BITARTRATE AND ACETAMINOPHEN 5; 325 MG/1; MG/1
1-2 TABLET ORAL EVERY 4 HOURS PRN
Status: DISCONTINUED | OUTPATIENT
Start: 2020-10-30 | End: 2020-11-01 | Stop reason: HOSPADM

## 2020-10-30 RX ORDER — NALOXONE HYDROCHLORIDE 0.4 MG/ML
.1-.4 INJECTION, SOLUTION INTRAMUSCULAR; INTRAVENOUS; SUBCUTANEOUS
Status: DISCONTINUED | OUTPATIENT
Start: 2020-10-30 | End: 2020-11-01 | Stop reason: HOSPADM

## 2020-10-30 RX ORDER — KETOROLAC TROMETHAMINE 15 MG/ML
15 INJECTION, SOLUTION INTRAMUSCULAR; INTRAVENOUS ONCE
Status: COMPLETED | OUTPATIENT
Start: 2020-10-30 | End: 2020-10-30

## 2020-10-30 RX ORDER — ONDANSETRON 2 MG/ML
4 INJECTION INTRAMUSCULAR; INTRAVENOUS ONCE
Status: COMPLETED | OUTPATIENT
Start: 2020-10-30 | End: 2020-10-30

## 2020-10-30 RX ORDER — BISACODYL 10 MG
10 SUPPOSITORY, RECTAL RECTAL DAILY PRN
Status: DISCONTINUED | OUTPATIENT
Start: 2020-10-30 | End: 2020-11-01 | Stop reason: HOSPADM

## 2020-10-30 RX ORDER — AMOXICILLIN 250 MG
2 CAPSULE ORAL 2 TIMES DAILY PRN
Status: DISCONTINUED | OUTPATIENT
Start: 2020-10-30 | End: 2020-11-01 | Stop reason: HOSPADM

## 2020-10-30 RX ORDER — POLYETHYLENE GLYCOL 3350 17 G/17G
17 POWDER, FOR SOLUTION ORAL DAILY
Status: DISCONTINUED | OUTPATIENT
Start: 2020-10-31 | End: 2020-11-01 | Stop reason: HOSPADM

## 2020-10-30 RX ORDER — ONDANSETRON 4 MG/1
4 TABLET, ORALLY DISINTEGRATING ORAL EVERY 6 HOURS PRN
Status: DISCONTINUED | OUTPATIENT
Start: 2020-10-30 | End: 2020-11-01 | Stop reason: HOSPADM

## 2020-10-30 RX ORDER — ALBUTEROL SULFATE 90 UG/1
2 AEROSOL, METERED RESPIRATORY (INHALATION) EVERY 4 HOURS PRN
Status: DISCONTINUED | OUTPATIENT
Start: 2020-10-30 | End: 2020-11-01 | Stop reason: HOSPADM

## 2020-10-30 RX ORDER — IOPAMIDOL 755 MG/ML
79 INJECTION, SOLUTION INTRAVASCULAR ONCE
Status: COMPLETED | OUTPATIENT
Start: 2020-10-30 | End: 2020-10-30

## 2020-10-30 RX ORDER — POLYETHYLENE GLYCOL 3350 17 G/17G
17 POWDER, FOR SOLUTION ORAL DAILY PRN
Status: DISCONTINUED | OUTPATIENT
Start: 2020-10-30 | End: 2020-11-01 | Stop reason: HOSPADM

## 2020-10-30 RX ADMIN — KETOROLAC TROMETHAMINE 15 MG: 15 INJECTION, SOLUTION INTRAMUSCULAR; INTRAVENOUS at 18:51

## 2020-10-30 RX ADMIN — DEXAMETHASONE 6 MG: 2 TABLET ORAL at 22:54

## 2020-10-30 RX ADMIN — IOPAMIDOL 79 ML: 755 INJECTION, SOLUTION INTRAVENOUS at 19:59

## 2020-10-30 RX ADMIN — SODIUM CHLORIDE 100 ML: 9 INJECTION, SOLUTION INTRAVENOUS at 19:59

## 2020-10-30 RX ADMIN — ACETAMINOPHEN 1000 MG: 500 TABLET, FILM COATED ORAL at 18:52

## 2020-10-30 RX ADMIN — ENOXAPARIN SODIUM 40 MG: 40 INJECTION SUBCUTANEOUS at 22:56

## 2020-10-30 RX ADMIN — SODIUM CHLORIDE 1000 ML: 9 INJECTION, SOLUTION INTRAVENOUS at 18:39

## 2020-10-30 RX ADMIN — ONDANSETRON 4 MG: 2 INJECTION INTRAMUSCULAR; INTRAVENOUS at 18:51

## 2020-10-30 RX ADMIN — ALBUTEROL SULFATE 2 PUFF: 90 AEROSOL, METERED RESPIRATORY (INHALATION) at 23:44

## 2020-10-30 ASSESSMENT — ENCOUNTER SYMPTOMS
SHORTNESS OF BREATH: 1
DIARRHEA: 1
VOMITING: 1
SORE THROAT: 1
FEVER: 1
RHINORRHEA: 0

## 2020-10-30 ASSESSMENT — MIFFLIN-ST. JEOR: SCORE: 1942.56

## 2020-10-30 NOTE — ED PROVIDER NOTES
History     Chief Complaint:  Fever    The history is provided by the patient and the spouse.      Eliseo Oneal is a 55 year old male with hypertension and SCOTT who presents with a fever. Eliseo was traveling with his wife (who is an RN had COVID-19 3 months ago) when he developed malaise, cough, sore throat, and fever 4 days ago. Upon returning home he continued to have symptoms he was treating with OTC cold medication (last dose >6 hours prior to arrival). He developed vomiting today as well as shortness of breath which ultimately prompted his visit. He has had a small amount of diarrhea but denies rhinorrhea, loss of taste, decreased urine, chest pain, or leg swelling.      Allergies:  No Known Allergies     Medications:    Zestril  Lisinopril    Past Medical History:    Ventral hernia without obstruction or gangrene  SCOTT  Hypertension  Obesity  Denies history of asthma and diabetes.    Past Surgical History:    Surgical history reviewed. No pertinent surgical history.    Family History:    Father: Type 2 diabetes, Cerebrovascular disease    Social History:  The patient was accompanied to the ED by his wife who is an RN in this ER.  Smoking Status: Never Smoker  Smokeless Tobacco: Never Used  Alcohol Use: Negative  Drug Use: Negative  PCP: [facility], [place] [facility]  Marital Status:       Review of Systems   Constitutional: Positive for fever.   HENT: Positive for sore throat. Negative for rhinorrhea.         No loss of taste   Respiratory: Positive for cough and shortness of breath.    Cardiovascular: Negative for chest pain and leg swelling.   Gastrointestinal: Positive for diarrhea and vomiting.   Genitourinary: Negative for decreased urine volume.   All other systems reviewed and are negative.    Physical Exam     Patient Vitals for the past 24 hrs:   BP Temp Temp src Pulse Resp SpO2 Height Weight   10/30/20 2244 122/83 98.7  F (37.1  C) Oral 66 18 97 % -- --   10/30/20 2200 125/77 -- -- 65 -- 95 %  "-- --   10/30/20 2130 114/74 -- -- 71 -- 95 % -- --   10/30/20 2100 126/76 -- -- 69 -- 94 % -- --   10/30/20 2030 120/78 -- -- 78 -- 95 % -- --   10/30/20 2000 130/68 -- -- 83 -- -- -- --   10/30/20 1930 133/73 101  F (38.3  C) Oral 84 -- 95 % -- --   10/30/20 1900 (!) 142/77 -- -- 90 -- 94 % -- --   10/30/20 1839 -- -- -- -- -- 94 % -- --   10/30/20 1830 -- -- -- -- -- 93 % -- --   10/30/20 1824 -- -- -- -- -- (!) 89 % -- --   10/30/20 1815 (!) 156/75 103.1  F (39.5  C) Oral 98 18 91 % 1.778 m (5' 10\") 111.1 kg (245 lb)       Physical Exam  General: Well-developed and well-nourished. Fatigued appearing middle aged  man. Cooperative.  Head:  Atraumatic.  Eyes:  Conjunctivae, lids, and sclerae are normal.  Neck:  Supple. Normal range of motion.  CV:  Regular rate and rhythm. Normal heart sounds with no murmurs, rubs, or gallops detected.  Resp:  No respiratory distress. Clear to auscultation bilaterally without decreased breath sounds, wheezing, rales, or rhonchi.  GI:  Soft. Non-distended. Non-tender.    MS:  Normal ROM. No bilateral lower extremity edema or calf tenderness.  Skin:  Warm. Non-diaphoretic. No pallor.  Neuro:  Awake. A&Ox3. Normal strength.  Psych: Normal mood and affect. Normal speech.  Vitals reviewed.    Emergency Department Course     Imaging:  Radiology findings were communicated with the patient who voiced understanding of the findings.    CT Chest Pulmonary Embolism w Contrast  1.  No pulmonary embolism demonstrated.  2.  Moderately extensive bilateral peripheral infiltrates,  differential includes COVID-19 pneumonia.  Reading per radiology    Laboratory:  Laboratory findings were communicated with the patient who voiced understanding of the findings.    CBC: WBC 4.5, HGB 15.2,   BMP: Glucose 131 (H), Calcium 8.2 (L) o/w WNL (Creatinine 1.21)    D dimer quantitative: 2.2 (H)    Lactic acid whole blood: 1.0    Symptomatic COVID-19 Virus (Coronavirus) by PCR Nasopharyngeal swab: " Pending    Interventions:  1839 NS 1L IV Bolus  1851 Toradol 15mg IV  1851 Zofran 4mg IV  1852 Tylenol 1,000mg PO    Emergency Department Course:    Past medical records, nursing notes, and vitals reviewed.  1823: I performed an exam of the patient and obtained history, as documented above.     IV was inserted and blood was drawn for laboratory testing, results above.    The patient was sent for a CT while in the emergency department, findings above    2107: I spoke with Dr. Ware regarding this patient.    2049: I rechecked and updated the patient.     I personally reviewed the laboratory and imaging results with the patient and answered all related questions prior to admission.     Findings and plan explained to the patient and his wife who consent to admission. Discussed the patient with Dr. Ware who will admit the patient for further monitoring, evaluation, and treatment.    Impression & Plan      Medical Decision Making:  Eliseo is a 55 year old man who has had now 5 days of malaise, sore throat, fever, diarrhea, and as of today, vomiting and shortness of breath.  He did recently travel but had no known COVID-19 exposures.  On exam, he appears generally well but fatigued.  He is not hypotensive or tachycardic although he is febrile to 103.1  F.  He also is hypoxic on room air to about 89% which responds quite nicely to 2 L of nasal cannula oxygen.  I have very high suspicion for COVID-19 in this man and the swab is pending.  However, with this probable diagnosis as well as recent travel I was also concerned for a concurrent pulmonary embolism.  D-dimer was 2.2 so he was sent for CT angiogram of the chest.  Fortunately, there is no PE although there are moderately extensive bilateral peripheral infiltrates consistent with COVID-19 pneumonia.  The remainder of his work-up is actually quite reassuring without lactic acidosis, electrolyte derangements, leukocytosis/leukopenia, or anemia.  Creatinine of 1.2  appears to be near his baseline of 0.9.  He was given IV fluids as well as Zofran, Toradol, and Tylenol.  He had improvement in his fever and had no further vomiting.  However, he requires admission as I suspect he may have worsening illness before he improves and because he is hypoxic on room air.  I discussed findings with Eliseo and his wife and answered all their questions.  They verbalized understanding and are amenable to admission.  I discussed the patient's case with Dr. Ware, hospitalist, who accepts admission and has no further orders.    Covid-19  Eliseo Oneal was evaluated during a global COVID-19 pandemic, which necessitated consideration that the patient might be at risk for infection with the SARS-CoV-2 virus that causes COVID-19.   Applicable protocols for evaluation were followed during the patient's care.   COVID-19 was considered as part of the patient's evaluation. The plan for testing is:  a test was obtained during this visit.    Diagnosis:    ICD-10-CM    1. Suspected COVID-19 virus infection  Z20.828    2. Multifocal pneumonia  J18.9      Disposition:   He is admitted into the care of Dr. Ware.    Scribe Disclosure:  I, John Noriega, am serving as a scribe at 6:20 PM on 10/30/2020 to document services personally performed by Mirella Fenton MD based on my observations and the provider's statements to me.  [facility] EMERGENCY DEPT       Mirella Fenton MD  10/31/20 0304

## 2020-10-31 LAB
ALBUMIN SERPL-MCNC: 2.6 G/DL (ref 3.4–5)
ALP SERPL-CCNC: 50 U/L (ref 40–150)
ALT SERPL W P-5'-P-CCNC: 62 U/L (ref 0–70)
ANION GAP SERPL CALCULATED.3IONS-SCNC: 6 MMOL/L (ref 3–14)
AST SERPL W P-5'-P-CCNC: 53 U/L (ref 0–45)
BASOPHILS # BLD AUTO: 0 10E9/L (ref 0–0.2)
BASOPHILS NFR BLD AUTO: 0.3 %
BILIRUB DIRECT SERPL-MCNC: 0.2 MG/DL (ref 0–0.2)
BILIRUB SERPL-MCNC: 0.3 MG/DL (ref 0.2–1.3)
BUN SERPL-MCNC: 27 MG/DL (ref 7–30)
CALCIUM SERPL-MCNC: 8.2 MG/DL (ref 8.5–10.1)
CHLORIDE SERPL-SCNC: 108 MMOL/L (ref 94–109)
CO2 SERPL-SCNC: 25 MMOL/L (ref 20–32)
CREAT SERPL-MCNC: 1.01 MG/DL (ref 0.66–1.25)
D DIMER PPP FEU-MCNC: 1.7 UG/ML FEU (ref 0–0.5)
DIFFERENTIAL METHOD BLD: ABNORMAL
EOSINOPHIL # BLD AUTO: 0 10E9/L (ref 0–0.7)
EOSINOPHIL NFR BLD AUTO: 0 %
ERYTHROCYTE [DISTWIDTH] IN BLOOD BY AUTOMATED COUNT: 13.1 % (ref 10–15)
GFR SERPL CREATININE-BSD FRML MDRD: 82 ML/MIN/{1.73_M2}
GLUCOSE SERPL-MCNC: 131 MG/DL (ref 70–99)
HCT VFR BLD AUTO: 44.3 % (ref 40–53)
HGB BLD-MCNC: 14.7 G/DL (ref 13.3–17.7)
IMM GRANULOCYTES # BLD: 0 10E9/L (ref 0–0.4)
IMM GRANULOCYTES NFR BLD: 0.5 %
LABORATORY COMMENT REPORT: ABNORMAL
LYMPHOCYTES # BLD AUTO: 0.5 10E9/L (ref 0.8–5.3)
LYMPHOCYTES NFR BLD AUTO: 14.4 %
MCH RBC QN AUTO: 29.8 PG (ref 26.5–33)
MCHC RBC AUTO-ENTMCNC: 33.2 G/DL (ref 31.5–36.5)
MCV RBC AUTO: 90 FL (ref 78–100)
MONOCYTES # BLD AUTO: 0.1 10E9/L (ref 0–1.3)
MONOCYTES NFR BLD AUTO: 1.9 %
NEUTROPHILS # BLD AUTO: 3.1 10E9/L (ref 1.6–8.3)
NEUTROPHILS NFR BLD AUTO: 82.9 %
NRBC # BLD AUTO: 0 10*3/UL
NRBC BLD AUTO-RTO: 0 /100
PLATELET # BLD AUTO: 152 10E9/L (ref 150–450)
POTASSIUM SERPL-SCNC: 3.9 MMOL/L (ref 3.4–5.3)
PROT SERPL-MCNC: 6.7 G/DL (ref 6.8–8.8)
RBC # BLD AUTO: 4.93 10E12/L (ref 4.4–5.9)
SARS-COV-2 RNA SPEC QL NAA+PROBE: POSITIVE
SODIUM SERPL-SCNC: 139 MMOL/L (ref 133–144)
SPECIMEN SOURCE: ABNORMAL
TROPONIN I SERPL-MCNC: 0.05 UG/L (ref 0–0.04)
WBC # BLD AUTO: 3.8 10E9/L (ref 4–11)

## 2020-10-31 PROCEDURE — 36415 COLL VENOUS BLD VENIPUNCTURE: CPT | Performed by: INTERNAL MEDICINE

## 2020-10-31 PROCEDURE — 85379 FIBRIN DEGRADATION QUANT: CPT | Performed by: INTERNAL MEDICINE

## 2020-10-31 PROCEDURE — 80048 BASIC METABOLIC PNL TOTAL CA: CPT | Performed by: INTERNAL MEDICINE

## 2020-10-31 PROCEDURE — 85025 COMPLETE CBC W/AUTO DIFF WBC: CPT | Performed by: INTERNAL MEDICINE

## 2020-10-31 PROCEDURE — 258N000003 HC RX IP 258 OP 636: Performed by: INTERNAL MEDICINE

## 2020-10-31 PROCEDURE — 120N000001 HC R&B MED SURG/OB

## 2020-10-31 PROCEDURE — 250N000012 HC RX MED GY IP 250 OP 636 PS 637: Performed by: INTERNAL MEDICINE

## 2020-10-31 PROCEDURE — 84484 ASSAY OF TROPONIN QUANT: CPT | Performed by: INTERNAL MEDICINE

## 2020-10-31 PROCEDURE — XW033E5 INTRODUCTION OF REMDESIVIR ANTI-INFECTIVE INTO PERIPHERAL VEIN, PERCUTANEOUS APPROACH, NEW TECHNOLOGY GROUP 5: ICD-10-PCS | Performed by: INTERNAL MEDICINE

## 2020-10-31 PROCEDURE — 250N000009 HC RX 250: Performed by: INTERNAL MEDICINE

## 2020-10-31 PROCEDURE — 250N000013 HC RX MED GY IP 250 OP 250 PS 637: Performed by: INTERNAL MEDICINE

## 2020-10-31 PROCEDURE — 80076 HEPATIC FUNCTION PANEL: CPT | Performed by: INTERNAL MEDICINE

## 2020-10-31 PROCEDURE — 99232 SBSQ HOSP IP/OBS MODERATE 35: CPT | Performed by: INTERNAL MEDICINE

## 2020-10-31 PROCEDURE — 250N000011 HC RX IP 250 OP 636: Performed by: INTERNAL MEDICINE

## 2020-10-31 RX ADMIN — DEXAMETHASONE 6 MG: 2 TABLET ORAL at 09:04

## 2020-10-31 RX ADMIN — REMDESIVIR 200 MG: 100 INJECTION, POWDER, LYOPHILIZED, FOR SOLUTION INTRAVENOUS at 14:56

## 2020-10-31 RX ADMIN — LISINOPRIL 40 MG: 40 TABLET ORAL at 09:04

## 2020-10-31 RX ADMIN — ALBUTEROL SULFATE 2 PUFF: 108 INHALANT RESPIRATORY (INHALATION) at 22:36

## 2020-10-31 RX ADMIN — ALBUTEROL SULFATE 2 PUFF: 108 INHALANT RESPIRATORY (INHALATION) at 14:06

## 2020-10-31 RX ADMIN — ALBUTEROL SULFATE 2 PUFF: 108 INHALANT RESPIRATORY (INHALATION) at 10:39

## 2020-10-31 RX ADMIN — ALBUTEROL SULFATE 2 PUFF: 108 INHALANT RESPIRATORY (INHALATION) at 17:13

## 2020-10-31 RX ADMIN — ENOXAPARIN SODIUM 40 MG: 40 INJECTION SUBCUTANEOUS at 22:35

## 2020-10-31 ASSESSMENT — ACTIVITIES OF DAILY LIVING (ADL)
ADLS_ACUITY_SCORE: 17

## 2020-10-31 ASSESSMENT — ENCOUNTER SYMPTOMS: COUGH: 1

## 2020-10-31 NOTE — H&P
"[facility]    History and Physical - Hospitalist Service       Date of Admission:  10/30/2020    Assessment & Plan   Eliseo Oneal is a 55 year old male admitted on 10/30/2020. He has a history of obesity, hypertension, obstructive sleep apnea and presents to the emergency department complaining of fever, cough, shortness of breath.  CT scan of the chest shows bilateral infiltrates, highly suggestive of Covid infection.  He is admitted for further evaluation and treatment.    Principal Problem:    Suspected COVID-19 virus infection    Pneumonitis    Admit as inpatient    Treat according to Covid order set, including use of corticosteroids    Supplemental oxygen as needed    Anticoagulants per Covid order set    Follow-up Covid PCR, high suspicion for Covid  Active Problems:    Hypertension goal BP (blood pressure) < 140/90    Continue ACE inhibitor    SCOTT (obstructive sleep apnea)    No CPAP due to high suspicion for COVID    Obesity (BMI 35.0-39.9) with comorbidity (H)    Patient would benefit from even modest weight loss        Diet:   Regular  DVT Prophylaxis: Enoxaparin (Lovenox) SQ  Johnson Catheter: not present  Code Status:   Full  Rule Out COVID-19 Handoff:  Eliseo is NOT A LOW SUSPICION PUI (needs further investigation).    Follow these instructions:    If COVID test is positive -> continue isolation precautions    If 1st COVID test is negative -> continue isolation precautions    -  Order a repeat COVID test to be done 72 hours after the 1st test  -  Place \"PUI Isolation\" nurse communication order  -  Consider ID consult    If 2nd COVID test performed after 72 hours is negative -> consider discontinuing COVID-specific isolation precautions if clinical course atypical for COVID and/or an alternative diagnosis emerges       Disposition Plan   Expected discharge: 2 - 3 days, recommended to prior living arrangement once pneumonitis treated.  Entered: Iman Ware MD 10/30/2020, " "9:21 PM     The patient's care was discussed with the Patient and ED MD and patient's wife, Nicole, who works in [employer] emergency department    Iman Ware MD  [facility]  Contact information available via [facility] Paging/Directory      ______________________________________________________________________    Chief Complaint   \"After the coughing fits, I am really winded.\"    History is obtained from the patient    History of Present Illness   Eliseo Oneal is a 55 year old male with obesity, hypertension, SCOTT who presents emergency department complaining of fever, cough, shortness of breath.  His wife, Nicole, works at [employer] emergency department and accompanies him today.    Patient complains of 4-day history of cough, shortness of breath and fever as high as 103.1F.  He reports profound fatigue, upset stomach.  Wife had Covid infection in July.      He says his chest feels heavy and he is winded even at rest and with any exertion.  He has paroxysms of cough that leaves him feeling exhausted and more short of breath.  He came to the emergency department where chest CT shows bilateral infiltrates consistent with pneumonitis, highly suggestive of Covid infection.  He is admitted for further evaluation and treatment.        Review of Systems    The 10 point Review of Systems is negative other than noted in the HPI or here.     Past Medical History    I have reviewed this patient's medical history and updated it with pertinent information if needed.   Past Medical History:   Diagnosis Date     HTN  SCOTT  obesity        Past Surgical History   I have reviewed this patient's surgical history and updated it with pertinent information if needed.  Past Surgical History:   Procedure Laterality Date     NO HISTORY OF SURGERY         Social History   I have reviewed this patient's social history and updated it with pertinent information if needed.  Social History     Tobacco Use     Smoking status: " Never Smoker     Smokeless tobacco: Never Used   Substance Use Topics     Alcohol use: No     Drug use: No       Family History   I have reviewed this patient's family history and updated it with pertinent information if needed.  Family History   Problem Relation Age of Onset     Diabetes Father 58        Type II diabetes     Cerebrovascular Disease Father      Diabetes Paternal Grandmother      Colon Cancer No family hx of      Prostate Cancer No family hx of      Coronary Artery Disease No family hx of      Hypertension No family hx of      Hyperlipidemia No family hx of      Breast Cancer No family hx of      Thyroid Disease No family hx of        Prior to Admission Medications   Prior to Admission Medications   Prescriptions Last Dose Informant Patient Reported? Taking?   clobetasol 0.05 % EX external ointment   No No   Sig: Apply ointment to affected areas on elbows twice daily x 14 days.   hydrocortisone valerate (WEST-MICHELINE) 0.2 % external ointment   No No   Sig: Apply thin layer of ointment to rash on elbow twice daily for 14 days.   Patient not taking: Reported on 3/24/2020   lisinopril (ZESTRIL) 40 MG tablet   No No   Sig: Take 1 tablet (40 mg) by mouth daily TAKE ONE TABLET BY MOUTH EVERY DAY   lisinopril 40 MG PO tablet   Yes Yes   Sig: Take 1 tablet (40 mg) by mouth      Facility-Administered Medications: None     Allergies   No Known Allergies    Physical Exam   Vital Signs: Temp: 101  F (38.3  C) Temp src: Oral BP: 126/76 Pulse: 69   Resp: 18 SpO2: 94 % O2 Device: Nasal cannula Oxygen Delivery: 2 LPM  Weight: 245 lbs 0 oz    Constitutional: Awake, alert, cooperative, no apparent distress.  Eyes: Conjunctiva and pupils examined and normal.  HEENT: Moist mucous membranes, normal dentition.  Respiratory: coarse breath sounds at both lung bases, left greater than right  Cardiovascular: Regular rate and rhythm, normal S1 and S2, and no murmur noted.  GI: Soft, non-distended, non-tender, normal bowel  sounds.  Lymph/Hematologic: No anterior cervical or supraclavicular adenopathy.  Skin: left flank has very faint changes of livido reticularis, mottling  Musculoskeletal: No joint swelling, erythema or tenderness.  Neurologic: Moves both arms and both legs, the neurologic examination is nonfocal.  Psychiatric: Alert, oriented to person, place and time, no obvious anxiety or depression.      Data   Data reviewed today: I reviewed all medications, new labs and imaging results over the last 24 hours. I personally reviewed the chest CT image(s) showing pneumonitis.    Recent Labs   Lab 10/30/20  1833   WBC 4.5   HGB 15.2   MCV 89         POTASSIUM 3.6   CHLORIDE 105   CO2 27   BUN 28   CR 1.21   ANIONGAP 5   EMERSON 8.2*   *     Recent Results (from the past 24 hour(s))   CT Chest Pulmonary Embolism w Contrast    Narrative    CT CHEST PULMONARY EMBOLISM WITH CONTRAST 10/30/2020 8:11 PM    CLINICAL HISTORY: Positive D-dimer, dyspnea, suspected COVID.    TECHNIQUE: CT angiogram chest during arterial phase injection IV  contrast. 2D and 3D MIP reconstructions were performed by the CT  technologist. Dose reduction techniques were used.     CONTRAST: 79mL Isovue-370    COMPARISON: None.    FINDINGS:  ANGIOGRAM CHEST: Pulmonary arteries are normal caliber and negative  for pulmonary emboli. Thoracic aorta is negative for dissection. No CT  evidence of right heart strain.    LUNGS AND PLEURA: Moderately extensive ill-defined peripheral  infiltrates. While nonspecific, differential includes COVID-19  pneumonia.    MEDIASTINUM/AXILLAE: A few borderline prominent lymph nodes are noted  which are likely reactive in this setting. No aneurysm.    UPPER ABDOMEN: No acute findings.    MUSCULOSKELETAL: No frankly destructive bony lesions.      Impression    IMPRESSION:  1.  No pulmonary embolism demonstrated.  2.  Moderately extensive bilateral peripheral infiltrates,  differential includes COVID-19 pneumonia.

## 2020-10-31 NOTE — PROGRESS NOTES
"[facility]  Hospitalist Progress Note  Paulo Wolf MD  10/31/2020    Assessment & Plan   Eliseo Oneal is a 55 year old male admitted on 10/30/2020. He has a history of obesity, hypertension, obstructive sleep apnea and presents to the emergency department complaining of fever, cough, shortness of breath.  CT scan of the chest shows bilateral infiltrates, highly suggestive of Covid infection.  He is admitted for further evaluation and treatment.     Principal Problem:    Suspected COVID-19 virus infection with hypoxemia    Pneumonitis  ? Admit as inpatient  ? Treat according to Covid order set, including use of corticosteroids  ? Supplemental oxygen as needed  ? Anticoagulants per Covid order set  ? Await decision from therapeutic committee to initiate remdesivir  Active Problems:    Hypertension goal BP (blood pressure) < 140/90  ? Continue ACE inhibitor    SCOTT (obstructive sleep apnea)  ? No CPAP nocturnl, continue oxygen    Obesity (BMI 35.0-39.9) with comorbidity (H)  ? Patient would benefit from even modest weight loss      Diet:   Regular  DVT Prophylaxis: Enoxaparin (Lovenox) SQ  Johnson Catheter: not present  Code Status:   Full      Disposition Plan  -- anticipate in 2-3 days    Interval History   -- chart reviewed  -- covid PCR is +    -Data reviewed today: I reviewed all new labs and imaging over the last 24 hours. I personally reviewed no images or EKG's today.    Physical Exam    , Blood pressure 135/86, pulse 79, temperature 98.7  F (37.1  C), temperature source Oral, resp. rate 18, height 1.778 m (5' 10\"), weight 111.1 kg (245 lb), SpO2 94 %.  Vitals:    10/30/20 1815   Weight: 111.1 kg (245 lb)     Vital Signs with Ranges  Temp:  [98.7  F (37.1  C)-103.1  F (39.5  C)] 98.7  F (37.1  C)  Pulse:  [65-98] 79  Resp:  [18] 18  BP: (114-156)/(68-86) 135/86  SpO2:  [89 %-97 %] 94 %  I/O's Last 24 hours  No intake/output data recorded.    Constitutional: Awake, alert, " cooperative, no apparent distress  Respiratory: Clear to auscultation bilaterally, no crackles or wheezing  Cardiovascular: Regular rate and rhythm, normal S1 and S2, and no murmur noted  GI: Normal bowel sounds, soft, non-distended, non-tender  Skin/Integumen: No rashes, no cyanosis, no edema  Other:      Medications   All medications were reviewed.    - MEDICATION INSTRUCTIONS -         albuterol  2 puff Inhalation 4x Daily     dexamethasone  6 mg Oral Daily     enoxaparin ANTICOAGULANT  40 mg Subcutaneous Q24H     lisinopril  40 mg Oral Daily     polyethylene glycol  17 g Oral Daily     senna-docusate  1 tablet Oral BID    Or     senna-docusate  2 tablet Oral BID     sodium chloride (PF)  3 mL Intracatheter Q8H        Data   Recent Labs   Lab 10/31/20  0636 10/30/20  2324 10/30/20  1833   WBC 3.8*  --  4.5   HGB 14.7  --  15.2   MCV 90  --  89     --  170     --  137   POTASSIUM 3.9  --  3.6   CHLORIDE 108  --  105   CO2 25  --  27   BUN 27  --  28   CR 1.01 1.23 1.21   ANIONGAP 6  --  5   EMERSON 8.2*  --  8.2*   *  --  131*   TROPI 0.047*  --   --        Recent Results (from the past 24 hour(s))   CT Chest Pulmonary Embolism w Contrast    Narrative    CT CHEST PULMONARY EMBOLISM WITH CONTRAST 10/30/2020 8:11 PM    CLINICAL HISTORY: Positive D-dimer, dyspnea, suspected COVID.    TECHNIQUE: CT angiogram chest during arterial phase injection IV  contrast. 2D and 3D MIP reconstructions were performed by the CT  technologist. Dose reduction techniques were used.     CONTRAST: 79mL Isovue-370    COMPARISON: None.    FINDINGS:  ANGIOGRAM CHEST: Pulmonary arteries are normal caliber and negative  for pulmonary emboli. Thoracic aorta is negative for dissection. No CT  evidence of right heart strain.    LUNGS AND PLEURA: Moderately extensive ill-defined peripheral  infiltrates. While nonspecific, differential includes COVID-19  pneumonia.    MEDIASTINUM/AXILLAE: A few borderline prominent lymph nodes are  noted  which are likely reactive in this setting. No aneurysm.    UPPER ABDOMEN: No acute findings.    MUSCULOSKELETAL: No frankly destructive bony lesions.      Impression    IMPRESSION:  1.  No pulmonary embolism demonstrated.  2.  Moderately extensive bilateral peripheral infiltrates,  differential includes COVID-19 pneumonia.    MD Paulo DOLAN MD  Text Page  (7am to 6pm)

## 2020-10-31 NOTE — PROGRESS NOTES
RECEIVING UNIT ED HANDOFF REVIEW    ED Nurse Handoff Report was reviewed by: Anna Chopra RN on October 30, 2020 at 10:15 PM

## 2020-10-31 NOTE — PROGRESS NOTES
Admission    Patient arrives to room 627 via cart from ED.  Care plan note: VSS on 2 L O2. A&Ox4, pleasant.     Inpatient nursing criteria listed below were met:    PCD's Documented: NA  Skin issues/needs documented : No, just admission questions completed.   Isolation education started/completed NA  Patient allergies verified with patient: Yes  Verified completion of Ewen Risk Assessment Tool:  Yes  Verified completion of Guardianship screening tool: Yes  Fall Prevention: Care plan updated, Education given and documented NA  Care Plan initiated: Yes  Home medications documented in belongings flowsheet: NA  Patient belongings documented in belongings flowsheet: Yes  Reminder note (belongings/ medications) placed in discharge instructions:NA  Admission profile/ required documentation complete: Yes  Bedside Report Letter given and explained to patient Yes  Visitor Designated? No, PUI Pt  If patient is a 72 hour hold/Commitment are belongings removed from room and locked up? NA

## 2020-10-31 NOTE — PLAN OF CARE
"Summary:     DATE & TIME: 10/30/2020 2797-4532  Cognitive Concerns/ Orientation : A&Ox4   BEHAVIOR & AGGRESSION TOOL COLOR: Green  CIWA SCORE: NA   ABNL VS/O2: VSS on 2L via NC. Satting mid 90s. Intermittent fevers, fever in ED, relieved with PO Tylenol.  No fever overnight.   MOBILITY: Ind, steady.   PAIN MANAGMENT: Denies pain.   DIET: Regular diet, poor appetite over the past week. Declined want for any meal or snack after arrival to unit from ED.   BOWEL/BLADDER: Continent, ambulates to BR.   ABNL LAB/BG: D-dimer 2.2, Ca2+ 8.2, CRP 88.3.   DRAIN/DEVICES: PIV SL  TELEMETRY RHYTHM: NA  SKIN: WNL, scattered bruising.   TESTS/PROCEDURES: NA  D/C DAY/GOALS/PLACE: Pending improvement and COVID work up.   OTHER IMPORTANT INFO: COVID swab pending. Pt had recent travel, shortly became short of breath, spiking fevers, weakness, poor appetite. LAKE. Diminished lung sounds. Generalized fatigue reported. Pt stating he \"feels better than when he first arrived\". Nursing will continue to monitor.   "

## 2020-10-31 NOTE — PHARMACY-ADMISSION MEDICATION HISTORY
Pharmacy Medication History  Admission medication history interview status for the 10/30/2020  admission is complete. See EPIC admission navigator for prior to admission medications       Medication history sources: Patient  Location of interview: Phone (personal cell called)  Medication history source reliability: Good  Adherence assessment: Good    Significant changes made to the medication list:  Added lisinopril      Additional medication history information:   none    Medication reconciliation completed by provider prior to medication history? No    Time spent in this activity: 5 minutes      Prior to Admission medications    Medication Sig Last Dose Taking? Auth Provider   lisinopril (ZESTRIL) 40 MG tablet Take 1 tablet (40 mg) by mouth daily TAKE ONE TABLET BY MOUTH EVERY DAY Past Week at pm Yes Franco Almazan DO Chelsea Mayer, PharmD  Inpatient Clinical Pharmacist  [phone]

## 2020-10-31 NOTE — ED NOTES
[facility]  ED Nurse Handoff Report    ED Chief complaint: Fever      ED Diagnosis:   Final diagnoses:   None       Code Status: Full Code    Allergies: No Known Allergies    Patient Story: Recent travel with his wife, who is an ER nurse.  4 days ago developed SOB and a fever.  Today had a sore throat, general malaise, diarrhea, and low PO.  Calm and pleasant gentleman.  1L fluid, tylenol, zofran, and toradol given.  Fever decreased from 103.1 to 101 and states he is feeling better.  Requiring 2L O2.  Focused Assessment:  A/O x4. Denies pain    Treatments and/or interventions provided: see above  Patient's response to treatments and/or interventions: see above    To be done/followed up on inpatient unit:  VS, fever medications, and O2    Does this patient have any cognitive concerns?: A/O x4    Activity level - Baseline/Home:  Independent  Activity Level - Current:   Stand with Assist    Patient's Preferred language: English   Needed?: No    Isolation: None and COVID r/o and special precautions  Infection: Not Applicable  COVID r/o and special precautions  Patient tested for COVID 19 prior to admission: YES  Bariatric?: Yes    Vital Signs:   Vitals:    10/30/20 1930 10/30/20 2000 10/30/20 2030 10/30/20 2100   BP: 133/73 130/68 120/78 126/76   Pulse: 84 83 78 69   Resp:       Temp: 101  F (38.3  C)      TempSrc: Oral      SpO2: 95%  95% 94%   Weight:       Height:           Cardiac Rhythm:     Was the PSS-3 completed:   Yes  What interventions are required if any?               Family Comments: Nicole, his wife is very supportive  OBS brochure/video discussed/provided to patient/family: No              Name of person given brochure if not patient: na              Relationship to patient: na    For the majority of the shift this patient's behavior was Green.   Behavioral interventions performed were encouragement.    ED NURSE PHONE NUMBER: ER

## 2020-11-01 VITALS
HEIGHT: 70 IN | SYSTOLIC BLOOD PRESSURE: 131 MMHG | TEMPERATURE: 98.6 F | BODY MASS INDEX: 35.07 KG/M2 | RESPIRATION RATE: 18 BRPM | DIASTOLIC BLOOD PRESSURE: 82 MMHG | OXYGEN SATURATION: 93 % | HEART RATE: 66 BPM | WEIGHT: 245 LBS

## 2020-11-01 LAB
ANION GAP SERPL CALCULATED.3IONS-SCNC: 4 MMOL/L (ref 3–14)
BASOPHILS # BLD AUTO: 0 10E9/L (ref 0–0.2)
BASOPHILS NFR BLD AUTO: 0.2 %
BUN SERPL-MCNC: 27 MG/DL (ref 7–30)
CALCIUM SERPL-MCNC: 8.4 MG/DL (ref 8.5–10.1)
CHLORIDE SERPL-SCNC: 107 MMOL/L (ref 94–109)
CO2 SERPL-SCNC: 27 MMOL/L (ref 20–32)
CREAT SERPL-MCNC: 1.06 MG/DL (ref 0.66–1.25)
D DIMER PPP FEU-MCNC: 1.2 UG/ML FEU (ref 0–0.5)
DIFFERENTIAL METHOD BLD: NORMAL
EOSINOPHIL # BLD AUTO: 0 10E9/L (ref 0–0.7)
EOSINOPHIL NFR BLD AUTO: 0 %
ERYTHROCYTE [DISTWIDTH] IN BLOOD BY AUTOMATED COUNT: 13.1 % (ref 10–15)
GFR SERPL CREATININE-BSD FRML MDRD: 77 ML/MIN/{1.73_M2}
GLUCOSE SERPL-MCNC: 110 MG/DL (ref 70–99)
HCT VFR BLD AUTO: 41.4 % (ref 40–53)
HGB BLD-MCNC: 13.9 G/DL (ref 13.3–17.7)
IL6 SERPL-MCNC: 52.75 PG/ML
IMM GRANULOCYTES # BLD: 0 10E9/L (ref 0–0.4)
IMM GRANULOCYTES NFR BLD: 0.3 %
LYMPHOCYTES # BLD AUTO: 0.8 10E9/L (ref 0.8–5.3)
LYMPHOCYTES NFR BLD AUTO: 13.4 %
MCH RBC QN AUTO: 30 PG (ref 26.5–33)
MCHC RBC AUTO-ENTMCNC: 33.6 G/DL (ref 31.5–36.5)
MCV RBC AUTO: 89 FL (ref 78–100)
MONOCYTES # BLD AUTO: 0.4 10E9/L (ref 0–1.3)
MONOCYTES NFR BLD AUTO: 6.9 %
NEUTROPHILS # BLD AUTO: 4.7 10E9/L (ref 1.6–8.3)
NEUTROPHILS NFR BLD AUTO: 79.2 %
NRBC # BLD AUTO: 0 10*3/UL
NRBC BLD AUTO-RTO: 0 /100
PLATELET # BLD AUTO: 180 10E9/L (ref 150–450)
POTASSIUM SERPL-SCNC: 3.8 MMOL/L (ref 3.4–5.3)
RBC # BLD AUTO: 4.64 10E12/L (ref 4.4–5.9)
SODIUM SERPL-SCNC: 138 MMOL/L (ref 133–144)
TROPONIN I SERPL-MCNC: 0.04 UG/L (ref 0–0.04)
WBC # BLD AUTO: 6 10E9/L (ref 4–11)

## 2020-11-01 PROCEDURE — 250N000009 HC RX 250: Performed by: INTERNAL MEDICINE

## 2020-11-01 PROCEDURE — 250N000013 HC RX MED GY IP 250 OP 250 PS 637: Performed by: INTERNAL MEDICINE

## 2020-11-01 PROCEDURE — 93005 ELECTROCARDIOGRAM TRACING: CPT

## 2020-11-01 PROCEDURE — 85379 FIBRIN DEGRADATION QUANT: CPT | Performed by: INTERNAL MEDICINE

## 2020-11-01 PROCEDURE — 36415 COLL VENOUS BLD VENIPUNCTURE: CPT | Performed by: INTERNAL MEDICINE

## 2020-11-01 PROCEDURE — 99238 HOSP IP/OBS DSCHRG MGMT 30/<: CPT | Performed by: INTERNAL MEDICINE

## 2020-11-01 PROCEDURE — 258N000003 HC RX IP 258 OP 636: Performed by: INTERNAL MEDICINE

## 2020-11-01 PROCEDURE — 93010 ELECTROCARDIOGRAM REPORT: CPT | Performed by: INTERNAL MEDICINE

## 2020-11-01 PROCEDURE — 250N000012 HC RX MED GY IP 250 OP 636 PS 637: Performed by: INTERNAL MEDICINE

## 2020-11-01 PROCEDURE — 80048 BASIC METABOLIC PNL TOTAL CA: CPT | Performed by: INTERNAL MEDICINE

## 2020-11-01 PROCEDURE — 84484 ASSAY OF TROPONIN QUANT: CPT | Performed by: INTERNAL MEDICINE

## 2020-11-01 PROCEDURE — 85025 COMPLETE CBC W/AUTO DIFF WBC: CPT | Performed by: INTERNAL MEDICINE

## 2020-11-01 RX ORDER — ALBUTEROL SULFATE 90 UG/1
2 AEROSOL, METERED RESPIRATORY (INHALATION) EVERY 4 HOURS PRN
DISCHARGE
Start: 2020-11-01 | End: 2021-10-03

## 2020-11-01 RX ORDER — DEXAMETHASONE 6 MG/1
6 TABLET ORAL DAILY
DISCHARGE
Start: 2020-11-02 | End: 2021-11-22

## 2020-11-01 RX ORDER — HYDROCODONE BITARTRATE AND ACETAMINOPHEN 5; 325 MG/1; MG/1
1-2 TABLET ORAL EVERY 4 HOURS PRN
Refills: 0 | Status: SHIPPED | DISCHARGE
Start: 2020-11-01 | End: 2021-10-03

## 2020-11-01 RX ORDER — ACETAMINOPHEN 325 MG/1
650 TABLET ORAL EVERY 4 HOURS PRN
DISCHARGE
Start: 2020-11-01

## 2020-11-01 RX ADMIN — DEXAMETHASONE 6 MG: 2 TABLET ORAL at 08:52

## 2020-11-01 RX ADMIN — ALBUTEROL SULFATE 2 PUFF: 90 AEROSOL, METERED RESPIRATORY (INHALATION) at 12:02

## 2020-11-01 RX ADMIN — ALBUTEROL SULFATE 2 PUFF: 90 AEROSOL, METERED RESPIRATORY (INHALATION) at 08:56

## 2020-11-01 RX ADMIN — LISINOPRIL 40 MG: 40 TABLET ORAL at 08:53

## 2020-11-01 RX ADMIN — REMDESIVIR 100 MG: 100 INJECTION, POWDER, LYOPHILIZED, FOR SOLUTION INTRAVENOUS at 12:01

## 2020-11-01 ASSESSMENT — ACTIVITIES OF DAILY LIVING (ADL)
ADLS_ACUITY_SCORE: 17

## 2020-11-01 NOTE — DISCHARGE SUMMARY
[facility]  Discharge Summary        Eliseo Oneal MRN# [id number]   YOB: 1965 Age: 55 year old     Date of Admission:  10/30/2020  Date of Discharge:  11/1/2020  Admitting Physician:  Iman Ware MD  Discharge Physician: Paulo Wolf MD  Discharging Service: Hospitalist     Primary Provider:  Clinic, [facility]  Primary Care Physician Phone Number: [phone]         Discharge Diagnoses/Problem Oriented Hospital Course (Providers):    Eliseo Oneal was admitted on 10/30/2020 by Iman Ware MD and I would refer you to their history and physical.  The following problems were addressed during his hospitalization:    Eliseo Oneal is a 55 year old male admitted on 10/30/2020. He has a history of obesity, hypertension, obstructive sleep apnea and presents to the emergency department complaining of fever, cough, shortness of breath.  CT scan of the chest shows bilateral infiltrates, highly suggestive of Covid infection.  He is admitted for further evaluation and treatment.     Principal Problem:    Suspected COVID-19 virus infection with hypoxemia    Pneumonitis  ? Admit as inpatient  ? Treat according to Covid order set, including use of corticosteroids and Remdesevir.  ? Supplemental oxygen as needed  ? Anticoagulants per Covid order set  ? Mild troponin elevation, denies any chest pain, ECG is non-ischemic  Active Problems:    Hypertension goal BP (blood pressure) < 140/90  ? Continue ACE inhibitor    SCOTT (obstructive sleep apnea)  ? No CPAP nocturnl, continue oxygen    Obesity (BMI 35.0-39.9) with comorbidity (H)  ? Patient would benefit from even modest weight loss      Diet:   Regular  DVT Prophylaxis: Enoxaparin (Lovenox) SQ  Johnson Catheter: not present  Code Status:   Full      Disposition Plan  -- transfer to [facility]         Code Status:      Full Code         Important Results:      NAD  HEENT ON N/C  PULM CTA  CV  RRR  GI SOFT +BS  MS NO EDEMA  NEURO NON FOCAL  DERM WARM AND DRY  PSYCH NORMAL         Pending Results:        Unresulted Labs Ordered in the Past 30 Days of this Admission     Date and Time Order Name Status Description    10/30/2020 2237 Interleukin 6 Blood In process                Discharge Instructions and Follow-Up:      Follow-up Appointments     Follow Up and recommended labs and tests      Follow up with PCP after discharge from TCU                  Discharge Disposition:      Transferred to [facility]         Discharge Medications:        Current Discharge Medication List      START taking these medications    Details   acetaminophen (TYLENOL) 325 MG tablet Take 2 tablets (650 mg) by mouth every 4 hours as needed for mild pain  Qty:      Associated Diagnoses: Suspected COVID-19 virus infection      albuterol (PROAIR HFA/PROVENTIL HFA/VENTOLIN HFA) 108 (90 Base) MCG/ACT inhaler Inhale 2 puffs into the lungs every 4 hours as needed for other (Bronchospasm)  Qty:      Comments: Pharmacy may dispense brand covered by insurance (Proair, or proventil or ventolin or generic albuterol inhaler)  Associated Diagnoses: Suspected COVID-19 virus infection      dexamethasone (DECADRON) 6 MG tablet Take 1 tablet (6 mg) by mouth daily  Qty:      Associated Diagnoses: Suspected COVID-19 virus infection      enoxaparin ANTICOAGULANT (LOVENOX) 40 MG/0.4ML syringe Inject 0.4 mLs (40 mg) Subcutaneous every 24 hours  Qty:      Associated Diagnoses: Suspected COVID-19 virus infection      HYDROcodone-acetaminophen (NORCO) 5-325 MG tablet Take 1-2 tablets by mouth every 4 hours as needed  Qty:  , Refills: 0    Associated Diagnoses: Suspected COVID-19 virus infection         CONTINUE these medications which have NOT CHANGED    Details   lisinopril (ZESTRIL) 40 MG tablet Take 1 tablet (40 mg) by mouth daily TAKE ONE TABLET BY MOUTH EVERY DAY  Qty: 90 tablet, Refills: 1    Associated Diagnoses: Hypertension goal BP (blood  pressure) < 140/90      melatonin 1 MG TABS tablet Take 1 tablet (1 mg) by mouth nightly as needed for sleep  Qty:      Associated Diagnoses: Suspected COVID-19 virus infection                  Allergies:       No Known Allergies         Consultations This Hospital Stay:      No consultations were requested during this admission          Discharge Orders      After Care Instructions     Activity - Up ad oliverio      Additional Discharge Instructions      Transfer to [facility] for further care         Advance Diet as Tolerated      Follow this diet upon discharge: Orders Placed This Encounter      Combination Diet Regular Diet Adult         General info for SNF      Length of Stay Estimate: Short Term Care: Estimated # of Days <30  Condition at Discharge: Improving  Level of care:skilled   Rehabilitation Potential: Excellent  Admission H&P remains valid and up-to-date: Yes  Recent Chemotherapy: N/A  Use Nursing Home Standing Orders: N/A         Mantoux instructions      Give two-step Mantoux (PPD) Per Facility Policy Yes                    Discharge Time:      Less than 30 minutes.        Image Results From This Hospital Stay (For Non-EPIC Providers):        Results for orders placed or performed during the hospital encounter of 10/30/20   CT Chest Pulmonary Embolism w Contrast    Narrative    CT CHEST PULMONARY EMBOLISM WITH CONTRAST 10/30/2020 8:11 PM    CLINICAL HISTORY: Positive D-dimer, dyspnea, suspected COVID.    TECHNIQUE: CT angiogram chest during arterial phase injection IV  contrast. 2D and 3D MIP reconstructions were performed by the CT  technologist. Dose reduction techniques were used.     CONTRAST: 79mL Isovue-370    COMPARISON: None.    FINDINGS:  ANGIOGRAM CHEST: Pulmonary arteries are normal caliber and negative  for pulmonary emboli. Thoracic aorta is negative for dissection. No CT  evidence of right heart strain.    LUNGS AND PLEURA: Moderately extensive ill-defined peripheral  infiltrates. While  nonspecific, differential includes COVID-19  pneumonia.    MEDIASTINUM/AXILLAE: A few borderline prominent lymph nodes are noted  which are likely reactive in this setting. No aneurysm.    UPPER ABDOMEN: No acute findings.    MUSCULOSKELETAL: No frankly destructive bony lesions.      Impression    IMPRESSION:  1.  No pulmonary embolism demonstrated.  2.  Moderately extensive bilateral peripheral infiltrates,  differential includes COVID-19 pneumonia.    BE RUVALCABA MD           Most Recent Lab Results In EPIC (For Non-EPIC Providers):    Most Recent 3 CBC's:  Recent Labs   Lab Test 11/01/20  1103 10/31/20  0636 10/30/20  1833   WBC 6.0 3.8* 4.5   HGB 13.9 14.7 15.2   MCV 89 90 89    152 170      Most Recent 3 BMP's:  Recent Labs   Lab Test 11/01/20  1103 10/31/20  0636 10/30/20  2324 10/30/20  1833    139  --  137   POTASSIUM 3.8 3.9  --  3.6   CHLORIDE 107 108  --  105   CO2 27 25  --  27   BUN 27 27  --  28   CR 1.06 1.01 1.23 1.21   ANIONGAP 4 6  --  5   EMERSON 8.4* 8.2*  --  8.2*   * 131*  --  131*     Most Recent 3 Troponin's:  Recent Labs   Lab Test 10/31/20  0636   TROPI 0.047*     Most Recent 3 INR's:No lab results found.  Most Recent 2 LFT's:  Recent Labs   Lab Test 10/31/20  0636 09/17/18  1815   AST 53* 26   ALT 62 34   ALKPHOS 50 84   BILITOTAL 0.3 0.5     Most Recent Cholesterol Panel:  Recent Labs   Lab Test 02/26/20  0825   CHOL 164   *   HDL 39*   TRIG 102     Most Recent 6 Bacteria Isolates From Any Culture (See EPIC Reports for Culture Details):No lab results found.  Most Recent TSH, T4 and HgbA1c:  Recent Labs   Lab Test 09/17/18  1815 12/19/16  0939 12/19/16  0939   TSH  --   --  1.11   A1C 5.6   < >  --     < > = values in this interval not displayed.

## 2020-11-01 NOTE — PROGRESS NOTES
Discharge    Patient discharged to [place] via wheelchair with  [employer] transportation.  Care plan note: See POC note    Listed belongings gathered and returned to patient. Yes  Care Plan and Patient education resolved: Yes  Prescriptions if needed, hard copies sent with patient  Yes  Home and hospital acquired medications returned to patient: NA  Medication Bin checked and emptied on discharge Yes  Follow up appointment made for patient: No

## 2020-11-01 NOTE — PLAN OF CARE
Summary:   Covid postive  DATE & TIME: 11/01/2020 1500  Cognitive Concerns/ Orientation : A&Ox4   BEHAVIOR & AGGRESSION TOOL COLOR: Green, calm and cooperative  CIWA SCORE: NA   ABNL VS/O2: VSS on 2L, sats running 92-93. LAKE  MOBILITY: Independent, steady  PAIN MANAGMENT: Denies pain.   DIET: Regular diet, good po  BOWEL/BLADDER: Continent, ambulates to BR   ABNL LAB/BG: D-dimer 1.2 Trop 0.040  DRAIN/DEVICES: PIV SL for Remdesivir  TELEMETRY RHYTHM: NA  SKIN: WNL, scattered bruising.   TESTS/PROCEDURES: NA  D/C DAY/GOALS/PLACE: To [place] today at 1530.  OTHER IMPORTANT INFO: Also taking decadron po.

## 2020-11-01 NOTE — PLAN OF CARE
Summary:   Covid postive  DATE & TIME: 10/31/2020 4934-6833  Cognitive Concerns/ Orientation : A&Ox4   BEHAVIOR & AGGRESSION TOOL COLOR: Green, calm and cooperative  CIWA SCORE: NA   ABNL VS/O2: T-99.2 max, other VSS on 2L   MOBILITY: Independent, steady  PAIN MANAGMENT: Denies pain.   DIET: Regular diet, good po  BOWEL/BLADDER: Continent, ambulates to BR   ABNL LAB/BG: D-dimer 1.7 from 2.2, CRP 88.3, Trop- 0.047, WBC-3.8  DRAIN/DEVICES: PIV SL  TELEMETRY RHYTHM: NA  SKIN: WNL, scattered bruising.   TESTS/PROCEDURES: NA  D/C DAY/GOALS/PLACE: pending progress  OTHER IMPORTANT INFO: continues on Remdesivir/Dex daily, remains LAKE, denies CP, LS diminished, some dry NPC, will monitor.

## 2020-11-05 LAB — INTERPRETATION ECG - MUSE: NORMAL

## 2020-11-06 ENCOUNTER — AMBULATORY - HEALTHEAST (OUTPATIENT)
Dept: CARE COORDINATION | Facility: CLINIC | Age: 55
End: 2020-11-06

## 2020-11-06 ENCOUNTER — COMMUNICATION - HEALTHEAST (OUTPATIENT)
Dept: CARE COORDINATION | Facility: CLINIC | Age: 55
End: 2020-11-06

## 2020-11-06 DIAGNOSIS — U07.1 2019 NOVEL CORONAVIRUS DISEASE (COVID-19): ICD-10-CM

## 2020-11-20 ENCOUNTER — VIRTUAL VISIT (OUTPATIENT)
Dept: FAMILY MEDICINE | Facility: CLINIC | Age: 55
End: 2020-11-20
Payer: COMMERCIAL

## 2020-11-20 DIAGNOSIS — H10.9 CONJUNCTIVITIS OF BOTH EYES, UNSPECIFIED CONJUNCTIVITIS TYPE: Primary | ICD-10-CM

## 2020-11-20 PROCEDURE — 99213 OFFICE O/P EST LOW 20 MIN: CPT | Mod: 95 | Performed by: FAMILY MEDICINE

## 2020-11-20 NOTE — PROGRESS NOTES
"Eliseo Oneal is a 55 year old male who is being evaluated via a billable video visit.      The patient has been notified of following:     \"This video visit will be conducted via a call between you and your physician/provider. We have found that certain health care needs can be provided without the need for an in-person physical exam.  This service lets us provide the care you need with a video conversation.  If a prescription is necessary we can send it directly to your pharmacy.  If lab work is needed we can place an order for that and you can then stop by our lab to have the test done at a later time.    Video visits are billed at different rates depending on your insurance coverage.  Please reach out to your insurance provider with any questions.    If during the course of the call the physician/provider feels a video visit is not appropriate, you will not be charged for this service.\"    Patient has given verbal consent for Video visit? Yes  How would you like to obtain your AVS? MyChart  If you are dropped from the video visit, the video invite should be resent to: Text to cell phone: [phone]  Will anyone else be joining your video visit? No    Subjective     Eliseo Oneal is a 55 year old male who presents today via video visit for the following health issues:    HPI        Patient was recently admitted at [facility] for acute hypoxic respiratory failure secondary to COVID-19 infection. On discharge, he was sent home dexamethasone and apixiban. Since being home, his COVID symptoms have improved. However, he started noticing that his eyes have been more blood shot over the past few days. Concerned that he is on apixiban. Denies any itching of eyes. No tearing or other discharge. No vision changes. Tried over the counter eye drop -- maybe hepful.    Video Start Time: 10:40 AM      Review of Systems   Constitutional, HEENT, cardiovascular, pulmonary, gi and gu systems are negative, except as " otherwise noted.      Objective           Vitals:  No vitals were obtained today due to virtual visit.    Physical Exam     GENERAL: Healthy, alert and no distress  EYES: Eyes difficult to see via video visit, however, did not appreciate overt redness or subconjunctival hemorrhage.  RESP: No audible wheeze, cough, or visible cyanosis.  No visible retractions or increased work of breathing.    SKIN: Visible skin clear. No significant rash, abnormal pigmentation or lesions.  NEURO: Cranial nerves grossly intact.  Mentation and speech appropriate for age.  PSYCH: Mentation appears normal, affect normal/bright, judgement and insight intact, normal speech and appearance well-groomed.            Assessment & Plan     1. Conjunctivitis of both eyes, unspecified conjunctivitis type: symptoms sound more consistent with conjunctivitis - I don't think it is related to apixiban. Can try Zaditor eye drops. Continue to monitoring for development of symptoms. If any vision changes or eye pain, follow up.       Return if symptoms worsen or fail to improve.    Franco Almazan DO  [facility]      Video-Visit Details    Type of service:  Video Visit    Video End Time:10:50 AM    Originating Location (pt. Location): Home    Distant Location (provider location):  [facility]     Platform used for Video Visit: [employer]ount[employer]

## 2021-01-15 ENCOUNTER — HEALTH MAINTENANCE LETTER (OUTPATIENT)
Age: 56
End: 2021-01-15

## 2021-04-20 DIAGNOSIS — I10 HYPERTENSION GOAL BP (BLOOD PRESSURE) < 140/90: ICD-10-CM

## 2021-04-20 NOTE — LETTER
[facility]   [address] [place] [id number]  [phone]            April 26, 2021    Eliseo Oneal                                                                                                                                                       [id number] [facility] [id number]              Dear Eliseo,    We have a refill request for you, but in order to obtain your   medication you need to be seen for a physical .   Please either schedule it through P[employer] or call [phone].     Thank you,  Sincerely,  [facility]

## 2021-04-21 NOTE — TELEPHONE ENCOUNTER
LOV: 11/20/2020  Patient due for physical  No future appt scheduled    Routing to [facility] to assist in scheduling      Radha Lima RN  [facility]

## 2021-04-22 NOTE — TELEPHONE ENCOUNTER
2nd attempt.    Left non detailed message for patient, also let him know we sent him my chart messages.    Nicolle Thompson MA

## 2021-04-26 RX ORDER — LISINOPRIL 40 MG/1
TABLET ORAL
Qty: 90 TABLET | Refills: 1 | Status: SHIPPED | OUTPATIENT
Start: 2021-04-26 | End: 2021-11-16

## 2021-05-15 ENCOUNTER — HEALTH MAINTENANCE LETTER (OUTPATIENT)
Age: 56
End: 2021-05-15

## 2021-06-12 NOTE — PROGRESS NOTES
Clinic Care Coordination Contact  [facility]/Voicemail       Clinical Data: Care Coordinator Outreach  Outreach attempted x 1.  Left message on patient's voicemail with call back information and requested return call.  Plan: . Care Coordinator will try to reach patient again in 1-2 business days.

## 2021-09-04 ENCOUNTER — HEALTH MAINTENANCE LETTER (OUTPATIENT)
Age: 56
End: 2021-09-04

## 2021-10-03 ENCOUNTER — OFFICE VISIT (OUTPATIENT)
Dept: URGENT CARE | Facility: URGENT CARE | Age: 56
End: 2021-10-03
Payer: COMMERCIAL

## 2021-10-03 ENCOUNTER — ANCILLARY PROCEDURE (OUTPATIENT)
Dept: GENERAL RADIOLOGY | Facility: CLINIC | Age: 56
End: 2021-10-03
Attending: FAMILY MEDICINE
Payer: COMMERCIAL

## 2021-10-03 VITALS
DIASTOLIC BLOOD PRESSURE: 86 MMHG | BODY MASS INDEX: 36.3 KG/M2 | OXYGEN SATURATION: 97 % | TEMPERATURE: 97.5 F | SYSTOLIC BLOOD PRESSURE: 142 MMHG | WEIGHT: 253 LBS | RESPIRATION RATE: 12 BRPM | HEART RATE: 66 BPM

## 2021-10-03 DIAGNOSIS — M25.562 ACUTE PAIN OF LEFT KNEE: ICD-10-CM

## 2021-10-03 DIAGNOSIS — M25.562 ACUTE PAIN OF LEFT KNEE: Primary | ICD-10-CM

## 2021-10-03 PROCEDURE — 73562 X-RAY EXAM OF KNEE 3: CPT | Mod: LT | Performed by: RADIOLOGY

## 2021-10-03 PROCEDURE — 99214 OFFICE O/P EST MOD 30 MIN: CPT | Performed by: FAMILY MEDICINE

## 2021-10-03 RX ORDER — CEPHALEXIN 500 MG/1
500 CAPSULE ORAL 2 TIMES DAILY
Qty: 14 CAPSULE | Refills: 0 | Status: SHIPPED | OUTPATIENT
Start: 2021-10-03 | End: 2022-11-23

## 2021-10-03 ASSESSMENT — ENCOUNTER SYMPTOMS
FEVER: 0
JOINT SWELLING: 1
NUMBNESS: 1
WOUND: 0
COLOR CHANGE: 1

## 2021-10-03 NOTE — PROGRESS NOTES
Assessment & Plan     Acute pain of left knee  Possibly due to recent overuse as he was moving helping a family member move.  He has some findings to suggest some previous ACL injury which could have contributed to his symptoms today.  I did to explain to them that I currently do not suspect septic arthritis given the lack of pain and full range of motion however with some overlying erythema, increased warmth localized cellulitis is possible.  We will start empiric treatment with Keflex ID for a week to see if there is any improvement.  Continue rest, ice, knee sleeve for support and elevation.  If symptoms get worse or there is a worsening swelling, advised him to come back for reevaluation and for possible knee joint aspiration for further valuation.  - XR Knee Left 3 Views  - cephALEXin (KEFLEX) 500 MG capsule  Dispense: 14 capsule; Refill: 0          Return in about 3 days (around 10/6/2021) for If symptoms do not improve or gets worse..    Jorge Luis Moreno MD  [facility]   Eliseo is a 56 year old who presents for the following health issues     HPI     Patient presents urgent care with concerns for left knee swelling, redness.  It occurred a few hours after helping his family member move furniture out of their home.  He did not recall any inciting trauma or injury.  Is concerned as the left knee feels quite tight, his wife who works in healthcare noticed that its become more red however he is not have any pain and no pain with movement of the knee.  She is worried that potentially he is developing some cellulitis.    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    No known history of gout.    Review of Systems   Constitutional: Negative for fever.   Musculoskeletal: Positive for joint swelling.   Skin: Positive for color change. Negative for wound.   Neurological: Positive for numbness.            Objective    BP (!) 142/86   Pulse  66   Temp 97.5  F (36.4  C) (Tympanic)   Resp 12   Wt 114.8 kg (253 lb)   SpO2 97%   BMI 36.30 kg/m    Body mass index is 36.3 kg/m .  Physical Exam  Vitals reviewed.   Constitutional:       Appearance: He is not ill-appearing.   Musculoskeletal:      Comments: Left knee, generalized edema without obvious effusion.  No discrete border however has some erythema over the knee with increase in warmth.  Left knee full range of motion without reported tenderness.    Positive left anterior drawer sign.  Negative posterior drawer sign.  Negative Venu's   Neurological:      General: No focal deficit present.            Xray - Reviewed and interpreted by me.  3 view left knee x-ray, good joint space without any evidence of significant compartment arthritis.  No fractures/dislocations.

## 2021-10-03 NOTE — NURSING NOTE
"Chief Complaint   Patient presents with     Urgent Care     Musculoskeletal Problem     Pt has a bit of swelling in his left knee today and the knee is red and warm to the touch.  It is not very painful but it feels tight.  No known injury.     Initial BP (!) 142/86   Pulse 66   Temp 97.5  F (36.4  C) (Tympanic)   Resp 12   Wt 114.8 kg (253 lb)   SpO2 97%   BMI 36.30 kg/m   Estimated body mass index is 36.3 kg/m  as calculated from the following:    Height as of 11/1/20: 1.778 m (5' 10\").    Weight as of this encounter: 114.8 kg (253 lb)..  BP completed using cuff size: evelyn Meraz R.N.      "

## 2021-10-03 NOTE — PATIENT INSTRUCTIONS
Patient Education     Knee Pain with Uncertain Cause    There are several common causes for knee pain. These can include:    A sprain of the ligaments that support the joint    An injury to the cartilage lining of the joint    Arthritis from wear-and-tear or inflammation  There are other causes as well. There may also be swelling, reduced movement of the knee joint, and pain with walking. A definite diagnosis will still need to be made. If your symptoms don't improve, further follow-up and testing may be needed.  Home care    Stay off the injured leg as much as possible until pain improves.    Apply an ice pack over the injured area for 15 to 20 minutes every 3 to 6 hours. You should do this for the first 24 to 48 hours. You can make an ice pack by filling a plastic bag that seals at the top with ice cubes and then wrapping it with a thin towel. Continue to use ice packs for relief of pain and swelling as needed. As the ice melts, be careful not to get your wrap, splint, or cast wet. After 48 hours, apply heat (warm shower or warm bath) for 15 to 20 minutes several times a day, or alternate ice and heat. If you have to wear a hook-and-loop knee brace, you can open it to apply the ice pack, or heat, directly to the knee. Never put ice directly on the skin. Always wrap the ice in a towel or other type of cloth.    You may use over-the-counter pain medicine to control pain, unless another pain medicine was prescribed. If you have chronic liver or kidney disease or ever had a stomach ulcer or gastrointestinal bleeding, talk with your healthcare provider before using these medicines.    If crutches or a walker have been recommended, don't put weight on the injured leg until you can do so without pain. Check with your healthcare provider before returning to sports or full work duties.    If you have a hook-and-loop knee brace, you can remove it to bathe and sleep, unless told otherwise.  Follow-up care  Follow up with  your healthcare provider as advised. This is usually within 1 to 2 weeks.  If X-rays were taken, you will be told of any new findings that may affect your care  Call 911  Call 911 if you have:    Shortness of breath    Chest pain  When to seek medical advice  Call your healthcare provider right away if any of these occur:    Toes or foot becomes swollen, cold, blue, numb, or tingly    Pain or swelling spreads over the knee or calf    Warmth or redness appears over the knee or calf    Other joints become painful    Rash appears    Fever of 100.4 F (38 C) or higher, or as directed by your healthcare provider    Chandler Thomas last reviewed this educational content on 5/1/2018 2000-2021 [employer]. All rights reserved. This information is not intended as a substitute for professional medical care. Always follow your healthcare professional's instructions.

## 2021-11-03 DIAGNOSIS — I10 HYPERTENSION GOAL BP (BLOOD PRESSURE) < 140/90: ICD-10-CM

## 2021-11-07 NOTE — TELEPHONE ENCOUNTER
Pt due for nurse only BP check please help schedule     Thank you     Latricia Shepherd RN, BSN  [facility]

## 2021-11-12 ENCOUNTER — ALLIED HEALTH/NURSE VISIT (OUTPATIENT)
Dept: FAMILY MEDICINE | Facility: CLINIC | Age: 56
End: 2021-11-12
Payer: COMMERCIAL

## 2021-11-12 VITALS — SYSTOLIC BLOOD PRESSURE: 132 MMHG | DIASTOLIC BLOOD PRESSURE: 80 MMHG | HEART RATE: 74 BPM

## 2021-11-12 DIAGNOSIS — I10 HYPERTENSION GOAL BP (BLOOD PRESSURE) < 140/90: Primary | ICD-10-CM

## 2021-11-12 PROCEDURE — 99207 PR NO CHARGE NURSE ONLY: CPT

## 2021-11-12 NOTE — PROGRESS NOTES
I met with Eliseo Oneal at the request of Dr. Almazan to recheck his blood pressure.  Blood pressure medications on the med list were reviewed with patient.    Patient has taken all medications as per usual regimen: Yes  Patient reports tolerating them without any issues or concerns: Yes    Vitals:    11/12/21 0711   BP: 132/80   BP Location: Right arm   Patient Position: Sitting   Cuff Size: Adult Large   Pulse: 74         No other questions or concerns.  Eleonora Wen on 11/12/2021 at 7:14 AM

## 2021-11-16 RX ORDER — LISINOPRIL 40 MG/1
TABLET ORAL
Qty: 90 TABLET | Refills: 0 | Status: SHIPPED | OUTPATIENT
Start: 2021-11-16 | End: 2022-05-06

## 2021-11-22 ENCOUNTER — OFFICE VISIT (OUTPATIENT)
Dept: FAMILY MEDICINE | Facility: CLINIC | Age: 56
End: 2021-11-22
Payer: COMMERCIAL

## 2021-11-22 VITALS
HEIGHT: 70 IN | BODY MASS INDEX: 36.79 KG/M2 | HEART RATE: 59 BPM | RESPIRATION RATE: 14 BRPM | WEIGHT: 257 LBS | SYSTOLIC BLOOD PRESSURE: 176 MMHG | TEMPERATURE: 98.6 F | DIASTOLIC BLOOD PRESSURE: 97 MMHG

## 2021-11-22 DIAGNOSIS — H61.22 IMPACTED CERUMEN OF LEFT EAR: ICD-10-CM

## 2021-11-22 DIAGNOSIS — H93.8X2 SENSATION OF PLUGGED EAR, LEFT: Primary | ICD-10-CM

## 2021-11-22 PROCEDURE — 99212 OFFICE O/P EST SF 10 MIN: CPT | Mod: 25 | Performed by: FAMILY MEDICINE

## 2021-11-22 PROCEDURE — 69209 REMOVE IMPACTED EAR WAX UNI: CPT | Mod: LT | Performed by: FAMILY MEDICINE

## 2021-11-22 ASSESSMENT — ENCOUNTER SYMPTOMS
SINUS PRESSURE: 1
FATIGUE: 0
FEVER: 0

## 2021-11-22 ASSESSMENT — MIFFLIN-ST. JEOR: SCORE: 1991.99

## 2021-11-22 NOTE — PROGRESS NOTES
"  Assessment & Plan     Sensation of plugged ear, left    Impacted cerumen of left ear  - left ear flushed   - Noticed improvement in hearing after left wax removal .           BMI:   Estimated body mass index is 36.88 kg/m  as calculated from the following:    Height as of this encounter: 1.778 m (5' 10\").    Weight as of this encounter: 116.6 kg (257 lb).   Weight management plan: Patient was referred to their PCP to discuss a diet and exercise plan.      Return in about 4 weeks (around 12/20/2021) for Routine preventive, appointment already scheduled.    Soniya Leong MD  [facility] SHELBY Whitman is a 56 year old who presents for the following health issues     History of Present Illness       He eats 2-3 servings of fruits and vegetables daily.He consumes 1 sweetened beverage(s) daily.He exercises with enough effort to increase his heart rate 20 to 29 minutes per day.  He exercises with enough effort to increase his heart rate 3 or less days per week.   He is taking medications regularly.       Concern - ear problem  Onset: 3 weeks  Description: feels like fluid in the ear left side  Intensity: no pain  Progression of Symptoms:  same  Accompanying Signs & Symptoms: none  Previous history of similar problem: none  Precipitating factors:        Worsened by: none   Alleviating factors:        Improved by: none  Therapies tried and outcome: otc flush kit not helping. Antihistamine not helping.        Review of Systems   Constitutional: Negative for fatigue and fever.   HENT: Positive for sinus pressure. Negative for congestion.         Left ear plugged             Objective    BP (!) 176/97 (BP Location: Right arm, Patient Position: Chair, Cuff Size: Adult Large)   Pulse 59   Temp 98.6  F (37  C) (Oral)   Resp 14   Ht 1.778 m (5' 10\")   Wt 116.6 kg (257 lb)   BMI 36.88 kg/m    Body mass index is 36.88 kg/m .  Physical Exam  HENT:      Right Ear: Tympanic membrane normal. There is " no impacted cerumen.      Left Ear: There is impacted cerumen.      Ears:      Comments: Left ear flushed .  TM normal after flushing left ear .  Abdominal:      General: Abdomen is flat.   Neurological:      General: No focal deficit present.

## 2022-05-06 ENCOUNTER — MYC REFILL (OUTPATIENT)
Dept: FAMILY MEDICINE | Facility: CLINIC | Age: 57
End: 2022-05-06
Payer: COMMERCIAL

## 2022-05-06 DIAGNOSIS — I10 HYPERTENSION GOAL BP (BLOOD PRESSURE) < 140/90: ICD-10-CM

## 2022-05-06 NOTE — LETTER
[address] [address]  [phone]       May 18, 2022    Eliseo Oneal  [address] [facility] [address]    Eliseo:    We have been calling you regarding a recent refill request we received for lisinopril.  Unfortunately, we were unable to reach you.  We are notifying you that you are due for an office visit with labs and a blood pressure check prior to your next refill.  You can schedule this appointment via [employer] or by calling the clinic at [phone].        Sincerely,    Franco Almazan DO / brando

## 2022-05-10 RX ORDER — LISINOPRIL 40 MG/1
40 TABLET ORAL DAILY
Qty: 60 TABLET | Refills: 0 | Status: SHIPPED | OUTPATIENT
Start: 2022-05-10 | End: 2022-07-08

## 2022-05-10 NOTE — TELEPHONE ENCOUNTER
Portia refbetsey, needs OV, labs, BP check.    Routing to [facility] to help schedule.    Grabiel YOUNG RN   [facility]

## 2022-05-11 ENCOUNTER — MYC MEDICAL ADVICE (OUTPATIENT)
Dept: FAMILY MEDICINE | Facility: CLINIC | Age: 57
End: 2022-05-11
Payer: COMMERCIAL

## 2022-05-11 ENCOUNTER — DOCUMENTATION ONLY (OUTPATIENT)
Dept: LAB | Facility: CLINIC | Age: 57
End: 2022-05-11
Payer: COMMERCIAL

## 2022-05-11 DIAGNOSIS — Z13.1 SCREENING FOR DIABETES MELLITUS: ICD-10-CM

## 2022-05-11 DIAGNOSIS — Z13.6 CARDIOVASCULAR SCREENING; LDL GOAL LESS THAN 160: Primary | ICD-10-CM

## 2022-05-11 DIAGNOSIS — I10 HYPERTENSION GOAL BP (BLOOD PRESSURE) < 140/90: ICD-10-CM

## 2022-05-11 DIAGNOSIS — Z12.5 SCREENING FOR PROSTATE CANCER: ICD-10-CM

## 2022-05-11 NOTE — PROGRESS NOTES
J Carlos has an upcoming lab only appt and currently has no future orders in his chart. Please place future orders as needed. Thanks!

## 2022-05-12 ENCOUNTER — LAB (OUTPATIENT)
Dept: LAB | Facility: CLINIC | Age: 57
End: 2022-05-12
Payer: COMMERCIAL

## 2022-05-12 DIAGNOSIS — Z12.5 SCREENING FOR PROSTATE CANCER: ICD-10-CM

## 2022-05-12 DIAGNOSIS — I10 HYPERTENSION GOAL BP (BLOOD PRESSURE) < 140/90: ICD-10-CM

## 2022-05-12 DIAGNOSIS — Z13.1 SCREENING FOR DIABETES MELLITUS: ICD-10-CM

## 2022-05-12 DIAGNOSIS — Z13.6 CARDIOVASCULAR SCREENING; LDL GOAL LESS THAN 160: ICD-10-CM

## 2022-05-12 LAB
ALBUMIN SERPL-MCNC: 3.7 G/DL (ref 3.4–5)
ALP SERPL-CCNC: 85 U/L (ref 40–150)
ALT SERPL W P-5'-P-CCNC: 42 U/L (ref 0–70)
ANION GAP SERPL CALCULATED.3IONS-SCNC: 7 MMOL/L (ref 3–14)
AST SERPL W P-5'-P-CCNC: 27 U/L (ref 0–45)
BILIRUB SERPL-MCNC: 0.6 MG/DL (ref 0.2–1.3)
BUN SERPL-MCNC: 24 MG/DL (ref 7–30)
CALCIUM SERPL-MCNC: 8.9 MG/DL (ref 8.5–10.1)
CHLORIDE BLD-SCNC: 111 MMOL/L (ref 94–109)
CHOLEST SERPL-MCNC: 168 MG/DL
CO2 SERPL-SCNC: 24 MMOL/L (ref 20–32)
CREAT SERPL-MCNC: 1.06 MG/DL (ref 0.66–1.25)
CREAT UR-MCNC: 204 MG/DL
FASTING STATUS PATIENT QL REPORTED: ABNORMAL
GFR SERPL CREATININE-BSD FRML MDRD: 81 ML/MIN/1.73M2
GLUCOSE BLD-MCNC: 114 MG/DL (ref 70–99)
HBA1C MFR BLD: 6 % (ref 0–5.6)
HDLC SERPL-MCNC: 41 MG/DL
LDLC SERPL CALC-MCNC: 110 MG/DL
MICROALBUMIN UR-MCNC: 26 MG/L
MICROALBUMIN/CREAT UR: 12.75 MG/G CR (ref 0–17)
NONHDLC SERPL-MCNC: 127 MG/DL
POTASSIUM BLD-SCNC: 3.8 MMOL/L (ref 3.4–5.3)
PROT SERPL-MCNC: 7.5 G/DL (ref 6.8–8.8)
PSA SERPL-MCNC: 0.46 UG/L (ref 0–4)
SODIUM SERPL-SCNC: 142 MMOL/L (ref 133–144)
TRIGL SERPL-MCNC: 84 MG/DL

## 2022-05-12 PROCEDURE — 80061 LIPID PANEL: CPT

## 2022-05-12 PROCEDURE — 80053 COMPREHEN METABOLIC PANEL: CPT

## 2022-05-12 PROCEDURE — 83036 HEMOGLOBIN GLYCOSYLATED A1C: CPT

## 2022-05-12 PROCEDURE — 82043 UR ALBUMIN QUANTITATIVE: CPT

## 2022-05-12 PROCEDURE — 36415 COLL VENOUS BLD VENIPUNCTURE: CPT

## 2022-05-12 PROCEDURE — G0103 PSA SCREENING: HCPCS

## 2022-06-11 ENCOUNTER — HEALTH MAINTENANCE LETTER (OUTPATIENT)
Age: 57
End: 2022-06-11

## 2022-07-05 DIAGNOSIS — I10 HYPERTENSION GOAL BP (BLOOD PRESSURE) < 140/90: ICD-10-CM

## 2022-07-05 NOTE — LETTER
[facility]  [address] [place] [address]  [phone]         Eliseo Oneal  [address] [facility] [address]       July 26, 2022        Dear Eliseo Oneal,       We have been unsuccessful in our attempts to reach you by phone. Please call us at [facility] ([phone]) between hours of 8:00 am and 5pm, Monday through Friday.     We received a refill request from your pharmacy for your medication(s).  At this time the nurses were able to give you a brandon refill, but you are due to be seen for an annual physical, fasting labs and medication review before further refills.  This appointment can be scheduled by calling [phone] or can be scheduled via [employer] as well.     Thank you ,    [facility] Care Team  On behalf of       Franco Almazan

## 2022-07-06 NOTE — TELEPHONE ENCOUNTER
Routing refill request to provider for review/approval because:   ACE Inhibitors (Including Combos) Protocol Failed 07/05/2022 04:25 AM   Protocol Details  Blood pressure under 140/90 in past 12 months        LOV 11/22/2021    Please advise on refill thank you        Latricia Shepherd RN, BSN  [facility]

## 2022-07-08 RX ORDER — LISINOPRIL 40 MG/1
TABLET ORAL
Qty: 60 TABLET | Refills: 0 | Status: SHIPPED | OUTPATIENT
Start: 2022-07-08 | End: 2022-08-29

## 2022-07-12 NOTE — TELEPHONE ENCOUNTER
Left non -detailed message. See below needs an appointment for further refills.    Nicolle Thompson MA

## 2022-07-15 NOTE — TELEPHONE ENCOUNTER
mychart not read.     Attempt # 2    Called # [phone]     Left a non detailed VM to call back at [phone] and ask for any available Triage Nurse.    Mary Mccarthy RN  [facility]

## 2022-08-29 DIAGNOSIS — I10 HYPERTENSION GOAL BP (BLOOD PRESSURE) < 140/90: ICD-10-CM

## 2022-08-31 NOTE — TELEPHONE ENCOUNTER
Routing refill request to provider for review/approval because:  Labs out of range & Labs not current  Multiple outreach attempts to schedule pt with no response  BP Readings from Last 3 Encounters:   11/22/21 (!) 176/97   11/12/21 132/80   10/03/21 (!) 142/86     Leidy Cintron RN

## 2022-09-01 RX ORDER — LISINOPRIL 40 MG/1
40 TABLET ORAL DAILY
Qty: 30 TABLET | Refills: 0 | Status: SHIPPED | OUTPATIENT
Start: 2022-09-01 | End: 2022-10-04

## 2022-10-02 DIAGNOSIS — I10 HYPERTENSION GOAL BP (BLOOD PRESSURE) < 140/90: ICD-10-CM

## 2022-10-03 ENCOUNTER — OFFICE VISIT (OUTPATIENT)
Dept: ORTHOPEDICS | Facility: CLINIC | Age: 57
End: 2022-10-03
Payer: COMMERCIAL

## 2022-10-03 VITALS
WEIGHT: 258 LBS | SYSTOLIC BLOOD PRESSURE: 168 MMHG | DIASTOLIC BLOOD PRESSURE: 110 MMHG | BODY MASS INDEX: 36.94 KG/M2 | HEIGHT: 70 IN

## 2022-10-03 DIAGNOSIS — S16.1XXA CERVICAL MYOFASCIAL STRAIN, INITIAL ENCOUNTER: ICD-10-CM

## 2022-10-03 DIAGNOSIS — S46.911A MUSCLE STRAIN OF RIGHT SCAPULAR REGION, INITIAL ENCOUNTER: ICD-10-CM

## 2022-10-03 DIAGNOSIS — S19.9XXA INJURY OF NECK, INITIAL ENCOUNTER: Primary | ICD-10-CM

## 2022-10-03 PROCEDURE — 99204 OFFICE O/P NEW MOD 45 MIN: CPT | Performed by: FAMILY MEDICINE

## 2022-10-03 RX ORDER — TIZANIDINE 2 MG/1
2 TABLET ORAL 2 TIMES DAILY PRN
Qty: 40 TABLET | Refills: 0 | Status: SHIPPED | OUTPATIENT
Start: 2022-10-03

## 2022-10-03 NOTE — PROGRESS NOTES
"ASSESSMENT & PLAN  Patient Instructions     1. Injury of neck, initial encounter    2. Cervical myofascial strain, initial encounter    3. Muscle strain of right scapular region, initial encounter      -Patient has right neck and shoulder pain due to muscle strains  -Patient will start formal physical therapy and home exercise program  -Patient may continue with ibuprofen and topical medications as needed.  Patient will start tizanidine 2 mg 1 to 2 tablets at nighttime as needed for muscle strain  -Patient will follow up if pain does not improve.  To consider short course of oral steroids versus advanced imaging versus trigger point injection  -Call direct clinic number [[phone]] at any time with questions or concerns.    Albert Yeo MD [facility]  [facility]          -----    SUBJECTIVE  Eliseo Oneal is a/an 57 year old Right handed male who is seen as a self referral for evaluation of right sided neck and shoulder pain. The patient is seen by themselves.    Onset: 2 week(s) ago. Patient describes injury as patient was playing pickleball one day, the day after notes he felt \"knot\" in right scapular area and started radiating now down his arm   Location of Pain: right sided distal neck radiating down lateral arm into lateral forearm   Rating of Pain at worst: 5/10  Rating of Pain Currently: 3/10  Worsened by: extended periods of typing, sleeping on side   Better with: ibuprofen, supporting arm, light activity   Treatments tried: ibuprofen, ice, Tiger Balm, IcyHot, TENS unit, chiropractic care 1 visit last week  Quality: throbbing   Associated symptoms: notes weakness in right hand/arm, denies numbness or tingling  Orthopedic history: NO  Relevant surgical history: NO  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[employer], desk work     Past Medical History:   Diagnosis Date     No active medical problems      Social History " "    Socioeconomic History     Marital status:      Spouse name: None     Number of children: None     Years of education: None     Highest education level: None   Tobacco Use     Smoking status: Never Smoker     Smokeless tobacco: Never Used   Vaping Use     Vaping Use: Never used   Substance and Sexual Activity     Alcohol use: Never     Drug use: Never     Sexual activity: Yes     Partners: Female   Other Topics Concern     Parent/sibling w/ CABG, MI or angioplasty before 65F 55M? No         Patient's past medical, surgical, social, and family histories were reviewed today and no changes are noted.    REVIEW OF SYSTEMS:  10 point ROS is negative other than symptoms noted above in HPI, Past Medical History or as stated below  Constitutional: NEGATIVE for fever, chills, change in weight  Skin: NEGATIVE for worrisome rashes, moles or lesions  GI/: NEGATIVE for bowel or bladder changes  Neuro: NEGATIVE for weakness, dizziness or paresthesias    OBJECTIVE:  BP (!) 168/110   Ht 1.778 m (5' 10\")   Wt 117 kg (258 lb)   BMI 37.02 kg/m     General: healthy, alert and in no distress  HEENT: no scleral icterus or conjunctival erythema  Skin: no suspicious lesions or rash. No jaundice.  CV: regular rhythm by palpation  Resp: normal respiratory effort without conversational dyspnea   Psych: normal mood and affect  Gait: normal steady gait with appropriate coordination and balance  Neuro: normal light touch sensory exam of the bilateral upper extremities.    MSK:  CERVICAL SPINE  Inspection:    normal cervical lordosis present, rounded shoulders, forward head posture  Palpation:    Tender about the cervical spinous processes, paracervical musculature (right), levator scapula (right), upper trapezius (right), rhomboids (right) and medial border of scapula. Otherwise remainder of the landmarks and nontender.  Range of Motion:     Flexion limited by tightness    Extension limited by tightness    Right side bend within " normal limits    Left side bend limited by tightness    Right rotation within normal limits    Left rotation limited by tightness  Strength:    triceps (C7) 4+/5 on right  Special Tests:    Positive: None    Negative: Spurling's (bilateral), Denson's (bilateral)    RIGHT SHOULDER  Inspection:    no swelling, bruising, discoloration, or obvious deformity or asymmetry  Palpation:    bony and tendinous landmarks are nontender.  Active Range of Motion:     Abduction normal0, FF normal0, ER normal0, IR normal.      Scapular dyskinesis absent  Strength:    Scapular plane abduction grossly intact,  ER grossly intact, IR grossly intact, biceps grossly intact, triceps weakness  Special Tests:    Positive: none    Negative: Neer's, Guerrero', supraspinatus (empty can), drop arm/painful arc, crossed arm adduction, Harmon's, Speed's and Yergason's    Independent visualization of the below image:  No results found for this or any previous visit (from the past 24 hour(s)).    Personal view of cervical spine x-rays taken office today show loss of cervical lordosis.  Mild intervertebral narrowing and anterior endplate osteophytes.  No acute fracture, listhesis or scoliosis.      Albert Yeo MD [facility]

## 2022-10-03 NOTE — PATIENT INSTRUCTIONS
1. Injury of neck, initial encounter    2. Cervical myofascial strain, initial encounter    3. Muscle strain of right scapular region, initial encounter      -Patient has right neck and shoulder pain due to muscle strains  -Patient will start formal physical therapy and home exercise program  -Patient may continue with ibuprofen and topical medications as needed.  Patient will start tizanidine 2 mg 1 to 2 tablets at nighttime as needed for muscle strain  -Patient will follow up if pain does not improve.  To consider short course of oral steroids versus advanced imaging versus trigger point injection  -Call direct clinic number [[phone]] at any time with questions or concerns.    Albert Yeo MD CAQSM  [facility]  [facility] [place]

## 2022-10-03 NOTE — LETTER
"    10/3/2022         RE: Eliseo Oneal  [address] [facility] [address]        Dear Colleague,    Thank you for referring your patient, Eliseo Oneal, to the [facility] [place]. Please see a copy of my visit note below.    ASSESSMENT & PLAN  Patient Instructions     1. Injury of neck, initial encounter    2. Cervical myofascial strain, initial encounter    3. Muscle strain of right scapular region, initial encounter      -Patient has right neck and shoulder pain due to muscle strains  -Patient will start formal physical therapy and home exercise program  -Patient may continue with ibuprofen and topical medications as needed.  Patient will start tizanidine 2 mg 1 to 2 tablets at nighttime as needed for muscle strain  -Patient will follow up if pain does not improve.  To consider short course of oral steroids versus advanced imaging versus trigger point injection  -Call direct clinic number [[phone]] at any time with questions or concerns.    Albert Yeo MD [facility]  [facility]          -----    SUBJECTIVE  Eliseo Oneal is a/an 57 year old Right handed male who is seen as a self referral for evaluation of right sided neck and shoulder pain. The patient is seen by themselves.    Onset: 2 week(s) ago. Patient describes injury as patient was playing pickleball one day, the day after notes he felt \"knot\" in right scapular area and started radiating now down his arm   Location of Pain: right sided distal neck radiating down lateral arm into lateral forearm   Rating of Pain at worst: 5/10  Rating of Pain Currently: 3/10  Worsened by: extended periods of typing, sleeping on side   Better with: ibuprofen, supporting arm, light activity   Treatments tried: ibuprofen, ice, Tiger Balm, IcyHot, TENS unit, chiropractic care 1 visit last week  Quality: throbbing   Associated symptoms: notes weakness in " "right hand/arm, denies numbness or tingling  Orthopedic history: NO  Relevant surgical history: NO  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[employer], desk work     Past Medical History:   Diagnosis Date     No active medical problems      Social History     Socioeconomic History     Marital status:      Spouse name: None     Number of children: None     Years of education: None     Highest education level: None   Tobacco Use     Smoking status: Never Smoker     Smokeless tobacco: Never Used   Vaping Use     Vaping Use: Never used   Substance and Sexual Activity     Alcohol use: Never     Drug use: Never     Sexual activity: Yes     Partners: Female   Other Topics Concern     Parent/sibling w/ CABG, MI or angioplasty before 65F 55M? No         Patient's past medical, surgical, social, and family histories were reviewed today and no changes are noted.    REVIEW OF SYSTEMS:  10 point ROS is negative other than symptoms noted above in HPI, Past Medical History or as stated below  Constitutional: NEGATIVE for fever, chills, change in weight  Skin: NEGATIVE for worrisome rashes, moles or lesions  GI/: NEGATIVE for bowel or bladder changes  Neuro: NEGATIVE for weakness, dizziness or paresthesias    OBJECTIVE:  BP (!) 168/110   Ht 1.778 m (5' 10\")   Wt 117 kg (258 lb)   BMI 37.02 kg/m     General: healthy, alert and in no distress  HEENT: no scleral icterus or conjunctival erythema  Skin: no suspicious lesions or rash. No jaundice.  CV: regular rhythm by palpation  Resp: normal respiratory effort without conversational dyspnea   Psych: normal mood and affect  Gait: normal steady gait with appropriate coordination and balance  Neuro: normal light touch sensory exam of the bilateral upper extremities.    MSK:  CERVICAL SPINE  Inspection:    normal cervical lordosis present, rounded shoulders, forward head posture  Palpation:    Tender about the cervical spinous processes, paracervical " musculature (right), levator scapula (right), upper trapezius (right), rhomboids (right) and medial border of scapula. Otherwise remainder of the landmarks and nontender.  Range of Motion:     Flexion limited by tightness    Extension limited by tightness    Right side bend within normal limits    Left side bend limited by tightness    Right rotation within normal limits    Left rotation limited by tightness  Strength:    triceps (C7) 4+/5 on right  Special Tests:    Positive: None    Negative: Spurling's (bilateral), Denson's (bilateral)    RIGHT SHOULDER  Inspection:    no swelling, bruising, discoloration, or obvious deformity or asymmetry  Palpation:    bony and tendinous landmarks are nontender.  Active Range of Motion:     Abduction normal0, FF normal0, ER normal0, IR normal.      Scapular dyskinesis absent  Strength:    Scapular plane abduction grossly intact,  ER grossly intact, IR grossly intact, biceps grossly intact, triceps weakness  Special Tests:    Positive: none    Negative: Neer's, Guerrero', supraspinatus (empty can), drop arm/painful arc, crossed arm adduction, Navarro's, Speed's and Yergason's    Independent visualization of the below image:  No results found for this or any previous visit (from the past 24 hour(s)).    Personal view of cervical spine x-rays taken office today show loss of cervical lordosis.  Mild intervertebral narrowing and anterior endplate osteophytes.  No acute fracture, listhesis or scoliosis.      Albert Yeo MD [facility]        Again, thank you for allowing me to participate in the care of your patient.        Sincerely,        Albert Yeo, MD

## 2022-10-04 RX ORDER — LISINOPRIL 40 MG/1
TABLET ORAL
Qty: 14 TABLET | Refills: 0 | Status: SHIPPED | OUTPATIENT
Start: 2022-10-04 | End: 2022-11-23

## 2022-10-04 NOTE — TELEPHONE ENCOUNTER
Routing refill request to provider for review/approval because:  Portia given x1 and patient did not follow up, please advise    Team please call to schedule  yearly with provider LOV 2020     Eleonora Snell RN

## 2022-10-05 ENCOUNTER — THERAPY VISIT (OUTPATIENT)
Dept: PHYSICAL THERAPY | Facility: CLINIC | Age: 57
End: 2022-10-05
Attending: FAMILY MEDICINE
Payer: COMMERCIAL

## 2022-10-05 DIAGNOSIS — S16.1XXA CERVICAL MYOFASCIAL STRAIN, INITIAL ENCOUNTER: ICD-10-CM

## 2022-10-05 DIAGNOSIS — S46.911A MUSCLE STRAIN OF RIGHT SCAPULAR REGION, INITIAL ENCOUNTER: ICD-10-CM

## 2022-10-05 PROCEDURE — 97161 PT EVAL LOW COMPLEX 20 MIN: CPT | Mod: GP | Performed by: PHYSICAL THERAPIST

## 2022-10-05 PROCEDURE — 97110 THERAPEUTIC EXERCISES: CPT | Mod: GP | Performed by: PHYSICAL THERAPIST

## 2022-10-05 NOTE — PROGRESS NOTES
Physical Therapy Initial Evaluation  Subjective:  Onset of right cervical pain radiating into the right upper extremity 2 weeks ago after playing pickle ball. Pt referred by MD for physical therapy on 10-3-22      Therapist Generated HPI Evaluation         Type of problem:  Cervical spine.    This is a new condition.  Condition occurred with:  Repetition/overuse.  Where condition occurred: during recreation/sport.  Patient reports pain:  Cervical right side.  Pain is described as aching, cramping and sharp and is intermittent.  Pain radiates to:  Upper arm right, shoulder right and lower arm right. Pain is worse during the day.  Since onset symptoms are unchanged.  Associated symptoms:  Loss of motion/stiffness and loss of strength. Symptoms are exacerbated by driving and lifting (working on the computer)  and relieved by rest and NSAID's (support of the right upper extremity).  Special tests included:  X-ray (see report ).  Previous treatment includes chiropractic (1 visit). There was moderate improvement following previous treatment.  Restrictions due to condition include:  Working in normal job without restrictions.  Barriers include:  None as reported by patient.                        Objective:  Standing Alignment:    Cervical/thoracic deviations alignment: forward head and shoulder posture.                    Flexibility/Screens:     Upper Extremity:        Decreased right upper extremity flexibility present at:  Pectoralis Major and Pectoralis Minor    Spine:      Decreased right spine flexibility:  Upper Trap and Levator                  Cervical/Thoracic Evaluation    AROM:  AROM Cervical:    Flexion:            Minimal loss   Extension:       Moderate loss   Rotation:         Left: minimal loss      Right: minimal loss   Side Bend:      Left: minimal loss      Right:  Moderate loss with symptoms radiating into the right upper extremity     Strength: weak scapular stabilizers     Cervical Myotomes:   normal                  DTR's:  normal          Cervical Dermatomes:  normal                    Cervical Palpation:  : point tenderness T2 through T6 right thoracic paraspinals, rhomboids and mid trapezius.                   Shoulder Evaluation:  ROM:  AROM:  normal                                  Strength:  normal                        Special Tests:      Right shoulder positive for the following special tests:Impingement  Palpation:  normal                                         General     ROS    Assessment/Plan:    Patient is a 57 year old male with cervical complaints.    Patient has the following significant findings with corresponding treatment plan.                Diagnosis 1:  Cervical/myofascial pain  Pain -  hot/cold therapy, manual therapy, self management, education and home program  Decreased ROM/flexibility - manual therapy, therapeutic exercise, therapeutic activity and home program  Decreased strength - therapeutic exercise, therapeutic activities and home program    Therapy Evaluation Codes:   1) History comprised of:   Personal factors that impact the plan of care:      None.    Comorbidity factors that impact the plan of care are:      High blood pressure.     Medications impacting care: Anti-inflammatory and Muscle relaxant.  2) Examination of Body Systems comprised of:   Body structures and functions that impact the plan of care:      Cervical spine.   Activity limitations that impact the plan of care are:      Driving, Reading/Computer work and Sleeping.  3) Clinical presentation characteristics are:   Stable/Uncomplicated.  4) Decision-Making    Low complexity using standardized patient assessment instrument and/or measureable assessment of functional outcome.  Cumulative Therapy Evaluation is: Low complexity.    Previous and current functional limitations:  (See Goal Flow Sheet for this information)    Short term and Long term goals: (See Goal Flow Sheet for this information)      Communication ability:  Patient appears to be able to clearly communicate and understand verbal and written communication and follow directions correctly.  Treatment Explanation - The following has been discussed with the patient:   RX ordered/plan of care  Anticipated outcomes  Possible risks and side effects  This patient would benefit from PT intervention to resume normal activities.   Rehab potential is good.    Frequency:  1 X week, once daily  Duration:  for 6 weeks  Discharge Plan:  Achieve all LTG.  Independent in home treatment program.  Reach maximal therapeutic benefit.    Please refer to the daily flowsheet for treatment today, total treatment time and time spent performing 1:1 timed codes.

## 2022-10-05 NOTE — PROGRESS NOTES
Physical Therapy Initial Evaluation  Subjective:  The history is provided by the patient.   Patient Health History  Eliseo Oneal being seen for Shoulder and neck pain.     Date of Onset: 2 weeks ago.   Problem occurred: Playing pickleball    Pain is reported as 5/10 on pain scale.  General health as reported by patient is good.  Pertinent medical history includes: high blood pressure.                Current occupation is .   Primary job tasks include:  Computer work.                                    Objective:  System    Physical Exam    General     ROS    Assessment/Plan:

## 2022-10-13 ENCOUNTER — TELEPHONE (OUTPATIENT)
Dept: ORTHOPEDICS | Facility: CLINIC | Age: 57
End: 2022-10-13

## 2022-10-13 ENCOUNTER — HOSPITAL ENCOUNTER (OUTPATIENT)
Dept: CT IMAGING | Facility: CLINIC | Age: 57
Discharge: HOME | End: 2022-10-13
Attending: STUDENT IN AN ORGANIZED HEALTH CARE EDUCATION/TRAINING PROGRAM | Admitting: STUDENT IN AN ORGANIZED HEALTH CARE EDUCATION/TRAINING PROGRAM
Payer: COMMERCIAL

## 2022-10-13 DIAGNOSIS — S12.100A CLOSED ODONTOID FRACTURE, INITIAL ENCOUNTER (H): ICD-10-CM

## 2022-10-13 DIAGNOSIS — S19.9XXA INJURY OF NECK, INITIAL ENCOUNTER: ICD-10-CM

## 2022-10-13 PROCEDURE — 72125 CT NECK SPINE W/O DYE: CPT

## 2022-10-13 NOTE — TELEPHONE ENCOUNTER
Patient also sent 2 [employer] messages today regarding these concerns / questions. Cono-Chart message was routed to Dr. Kay (in Dr. Yeo's absence) for further review.    Romina Jay MBA, ATC

## 2022-10-13 NOTE — TELEPHONE ENCOUNTER
[facility] Call Center    Phone Message    May a detailed message be left on voicemail: yes     Reason for Call: Other: Patient stated that he still in pain. Him and his spouse did not realize that there was also a fractured, may be there is something else they can do? Maybe a CT scan? They would like a call back.    Action Taken: Message routed to: [facility]  Travel Screening: Not Applicable

## 2022-10-13 NOTE — TELEPHONE ENCOUNTER
Dr. Kay called and spoke with patient. Please see RECCY message for additional documentation of that call.    Closing this encounter.    Romina Jay MBA, ATC

## 2022-10-14 ENCOUNTER — TELEPHONE (OUTPATIENT)
Dept: ORTHOPEDICS | Facility: CLINIC | Age: 57
End: 2022-10-14

## 2022-10-14 NOTE — TELEPHONE ENCOUNTER
[facility] Call Center    Phone Message    May a detailed message be left on voicemail: yes     Reason for Call: Other: Pt is calling for the results of his CT scan, would like a call back     Action Taken: Other: [facility]    Travel Screening: Not Applicable

## 2022-10-14 NOTE — TELEPHONE ENCOUNTER
Upon chart review, CT scan has not yet been read.    Return call to patient. Spoke with patient and informed him of above. He states this is okay, he was just calling to check before they left for [place] later this afternoon in case there were urgent recommendations.    Informed him we will continue to monitor and get back to him as soon as we have finalized results.    He verbalized understanding and was appreciative of call back.    Romina Jay MBA, ATC

## 2022-10-14 NOTE — TELEPHONE ENCOUNTER
Called to discuss CT cervical spine results with J Carlos after speaking with the reading radiologist. The queried area of possible odontoid fracture appears to represent a cleft/anatomic variant rather than fracture. As such, reassurance was provided. More likely that patient's symptoms are stemming from mid to moderate facet arthropathy/foraminal stenosis and degenerative disc disease contributing to moderate cervical spinal stenosis. Recommend that he continue formal physical therapy, NSAIDS as needed, tizanidine as needed and activity as tolerated. Also recommend that he schedule a Spine/Neursurgery consultation for further evaluation. This referral was placed yesterday and he was provide with the number.     Will also relay information about incidentally-noted thyroid nodule via [employer].    Janis Weeks MD, [facility]

## 2022-10-18 ENCOUNTER — OFFICE VISIT (OUTPATIENT)
Dept: NEUROSURGERY | Facility: CLINIC | Age: 57
End: 2022-10-18
Attending: STUDENT IN AN ORGANIZED HEALTH CARE EDUCATION/TRAINING PROGRAM
Payer: COMMERCIAL

## 2022-10-18 VITALS — DIASTOLIC BLOOD PRESSURE: 116 MMHG | HEART RATE: 63 BPM | OXYGEN SATURATION: 98 % | SYSTOLIC BLOOD PRESSURE: 173 MMHG

## 2022-10-18 DIAGNOSIS — S12.100A CLOSED ODONTOID FRACTURE, INITIAL ENCOUNTER (H): ICD-10-CM

## 2022-10-18 DIAGNOSIS — M54.12 CERVICAL RADICULOPATHY: Primary | ICD-10-CM

## 2022-10-18 DIAGNOSIS — S19.9XXA INJURY OF NECK, INITIAL ENCOUNTER: ICD-10-CM

## 2022-10-18 PROCEDURE — G0463 HOSPITAL OUTPT CLINIC VISIT: HCPCS

## 2022-10-18 PROCEDURE — 99203 OFFICE O/P NEW LOW 30 MIN: CPT | Performed by: PHYSICIAN ASSISTANT

## 2022-10-18 ASSESSMENT — PAIN SCALES - GENERAL: PAINLEVEL: MODERATE PAIN (5)

## 2022-10-18 NOTE — PROGRESS NOTES
NEUROSURGERY CLINIC CONSULT NOTE     DATE OF VISIT: 10/18/2022     SUBJECTIVE:     Eliseo Oneal is a pleasant 57 year old male who presents to the clinic today for consultation on cervical spine pain with right arm radiculopathy. He is referred to the Neurosurgery Clinic by Dr. Weeks in Primary Care.   Today, he describes a daily with fluctuating intensity, sharp, aching, burning pain that initiates in the midline cervical spine region and radiates distally in what sounds like the right C7 distribution. This pain is not accompanied by paresthesia or numbness but he does have perceived weakness in the same distribution. Lying on his left side seems to aggravate the symptoms, while alleviation is obtained by positional changes and putting his arm over his head. No mechanism of injury such as trauma or a fall is associated with the onset of the pain but this may have been provoked by playing pickleball. There are no bowel or bladder changes. He denies saddle anesthesia. He denies changes in gait, instability, or falling episodes. There has been no significant change in his handwriting or hand dexterity.   He has participated in conservative therapies to include physical therapy, chiropractic care, NSAID's, a Medrol Dosepak and muscle relaxants. None of these modalities have provided any significant long term relief.       Current Outpatient Medications:      acetaminophen (TYLENOL) 325 MG tablet, Take 2 tablets (650 mg) by mouth every 4 hours as needed for mild pain (Patient not taking: Reported on 10/3/2022), Disp:  , Rfl:      cephALEXin (KEFLEX) 500 MG capsule, Take 1 capsule (500 mg) by mouth 2 times daily (Patient not taking: Reported on 10/3/2022), Disp: 14 capsule, Rfl: 0     lisinopril (ZESTRIL) 40 MG tablet, TAKE ONE TABLET BY MOUTH EVERY DAY *NEED APPOINTMENT FOR FURTHER FILLS*, Disp: 14 tablet, Rfl: 0     methylPREDNISolone (MEDROL DOSEPAK) 4 MG tablet therapy pack, Follow Package Directions, Disp:  21 tablet, Rfl: 0     tiZANidine (ZANAFLEX) 2 MG tablet, Take 1 tablet (2 mg) by mouth 2 times daily as needed for muscle spasms, Disp: 40 tablet, Rfl: 0     No Known Allergies     Past Medical History:   Diagnosis Date     No active medical problems         ROS: 10 point review of symptoms are negative other than the symptoms noted above in the HPI.     Family History has been reviewed with the patient, there are no pertinent findings to presenting concern.     Past Surgical History:   Procedure Laterality Date     NO HISTORY OF SURGERY          Social History     Tobacco Use     Smoking status: Never     Smokeless tobacco: Never   Vaping Use     Vaping Use: Never used   Substance Use Topics     Alcohol use: Never     Drug use: Never        OBJECTIVE:   BP (!) 173/116   Pulse 63   SpO2 98%    There is no height or weight on file to calculate BMI.     Imaging:     CT CERVICAL SPINE WITHOUT CONTRAST   10/13/2022 5:15 PM    Findings, per radiologist read, notable for:      1. No evidence of acute fracture or subluxation in the cervical spine.  2. Degenerative change with multiple stenoses.  3. Incidental left thyroid nodule measuring over 2 cm for which  ultrasound correlation would be typically recommended if not  previously performed.    Full radiological report in chart. Imaging was reviewed with with patient today.     Exam:     Patient appears comfortable, conversational, and in no apparent distress.   Head: Normocephalic, without obvious abnormality, atraumatic, no facial asymmetry.   Eyes: conjunctivae/corneas clear. PERRL, EOM's intact.   Throat: lips, mucosa, and tongue normal; teeth and gums normal.   Neck: supple, symmetrical, trachea midline, no adenopathy and thyroid: not enlarged, symmetric, no tenderness/mass/nodules.   Lungs: clear to auscultation bilaterally.   Heart: regular rate and rhythm.   Abdomen: soft, non-tender; bowel sounds normal; no masses, no organomegaly.   Pulses: 2+ and symmetric.    Skin: Skin color, texture, turgor normal. No rashes or lesions.     CN II-XII grossly intact, alert and appropriate with conversation and following commands.   Gait is non-antalgic. Able to tandem walk. Able to walk on toes and heels without difficulty.   Cervical spine is tender to palpation over the right musculature. Appropriate range of motion of neck, not concerning for lhermitte's phenomenon.   Bilateral bicep 2/4 and tricep reflexes 1/4. Sensation intact throughout upper extremities.     UE muscle strength  Right  Left    Deltoid  5/5  5/5    Biceps  5/5  5/5    Triceps  4+/5  5/5    Hand intrinsics  5/5  5/5    Hand grasp  5/5  5/5    Denson signs  neg  neg      Lumbar spine is  tender to palpation.  Intact sensation throughout lower extremities.   Bilateral patellar 2/4 and achilles reflex 1/4.      LE muscle strength  Right  Left    Iliopsoas (hip flexion)  5/5  5/5    Quad (knee extension)  5/5  5/5    Hamstring (knee flexion)  5/5  5/5    Gastrocnemius (PF)  5/5  5/5    Tibialis Ant. (DF)  5/5  5/5    EHL  5/5  5/5      Negative Babinski bilaterally. Negative for clonus.   Calves are soft and non-tender bilaterally.       ASSESSMENT/PLAN:     Eliseo Oneal is a 57 year old male who presents to the clinic for consultation on a daily with fluctuating intensity, sharp, aching, burning pain that initiates in the midline cervical spine region and radiates distally in what sounds like the right C7 distribution. This pain is not accompanied by paresthesia or numbness but he does have perceived weakness. The patient's most recent imaging was reviewed with him today. It was explained that images show degenerative change with multiple stenoses. On exam, he is noted to have diminished right triceps strength, but normal  sensation and range of motion. He has attempted conservative management with physical therapy, chiropractic care, NSAID's, a Medrol Dosepak and muscle relaxants, without resolution of  symptoms.     Based on his physical exam, we do feel that it would be in his best interest to obtain a cervical spine MRI to further assess our concern for cervical stenosis, a bulging / herniated disk or other concerning pathology. Once the images have been obtained he has agreed to call our office back at [phone] to further discuss possible surgical interventions or other conservative therapies.      We also discussed signs of a worsening problem that he should seek being evaluated.        Respectfully,     NATALIYA Conway, PATRANG  [facility] Neurosurgery  [facility]  Tel: [phone]      Exam, imaging, and plan reviewed by Dr. Bull.

## 2022-10-18 NOTE — LETTER
10/18/2022         RE: Eliseo Oneal  [address] [address] [address]        Dear Colleague,    Thank you for referring your patient, Eliseo Oneal, to the [facility] NEUROSURGERY CLINIC [place]. Please see a copy of my visit note below.    NEUROSURGERY CLINIC CONSULT NOTE     DATE OF VISIT: 10/18/2022     SUBJECTIVE:     Eliseo Oneal is a pleasant 57 year old male who presents to the clinic today for consultation on cervical spine pain with right arm radiculopathy. He is referred to the Neurosurgery Clinic by Dr. Weeks in Primary Care.   Today, he describes a daily with fluctuating intensity, sharp, aching, burning pain that initiates in the midline cervical spine region and radiates distally in what sounds like the right C7 distribution. This pain is not accompanied by paresthesia or numbness but he does have perceived weakness in the same distribution. Lying on his left side seems to aggravate the symptoms, while alleviation is obtained by positional changes and putting his arm over his head. No mechanism of injury such as trauma or a fall is associated with the onset of the pain but this may have been provoked by playing pickleball. There are no bowel or bladder changes. He denies saddle anesthesia. He denies changes in gait, instability, or falling episodes. There has been no significant change in his handwriting or hand dexterity.   He has participated in conservative therapies to include physical therapy, chiropractic care, NSAID's, a Medrol Dosepak and muscle relaxants. None of these modalities have provided any significant long term relief.       Current Outpatient Medications:      acetaminophen (TYLENOL) 325 MG tablet, Take 2 tablets (650 mg) by mouth every 4 hours as needed for mild pain (Patient not taking: Reported on 10/3/2022), Disp:  , Rfl:      cephALEXin (KEFLEX) 500 MG capsule, Take 1 capsule (500 mg) by mouth 2 times daily (Patient not  taking: Reported on 10/3/2022), Disp: 14 capsule, Rfl: 0     lisinopril (ZESTRIL) 40 MG tablet, TAKE ONE TABLET BY MOUTH EVERY DAY *NEED APPOINTMENT FOR FURTHER FILLS*, Disp: 14 tablet, Rfl: 0     methylPREDNISolone (MEDROL DOSEPAK) 4 MG tablet therapy pack, Follow Package Directions, Disp: 21 tablet, Rfl: 0     tiZANidine (ZANAFLEX) 2 MG tablet, Take 1 tablet (2 mg) by mouth 2 times daily as needed for muscle spasms, Disp: 40 tablet, Rfl: 0     No Known Allergies     Past Medical History:   Diagnosis Date     No active medical problems         ROS: 10 point review of symptoms are negative other than the symptoms noted above in the HPI.     Family History has been reviewed with the patient, there are no pertinent findings to presenting concern.     Past Surgical History:   Procedure Laterality Date     NO HISTORY OF SURGERY          Social History     Tobacco Use     Smoking status: Never     Smokeless tobacco: Never   Vaping Use     Vaping Use: Never used   Substance Use Topics     Alcohol use: Never     Drug use: Never        OBJECTIVE:   BP (!) 173/116   Pulse 63   SpO2 98%    There is no height or weight on file to calculate BMI.     Imaging:     CT CERVICAL SPINE WITHOUT CONTRAST   10/13/2022 5:15 PM    Findings, per radiologist read, notable for:      1. No evidence of acute fracture or subluxation in the cervical spine.  2. Degenerative change with multiple stenoses.  3. Incidental left thyroid nodule measuring over 2 cm for which  ultrasound correlation would be typically recommended if not  previously performed.    Full radiological report in chart. Imaging was reviewed with with patient today.     Exam:     Patient appears comfortable, conversational, and in no apparent distress.   Head: Normocephalic, without obvious abnormality, atraumatic, no facial asymmetry.   Eyes: conjunctivae/corneas clear. PERRL, EOM's intact.   Throat: lips, mucosa, and tongue normal; teeth and gums normal.   Neck: supple,  symmetrical, trachea midline, no adenopathy and thyroid: not enlarged, symmetric, no tenderness/mass/nodules.   Lungs: clear to auscultation bilaterally.   Heart: regular rate and rhythm.   Abdomen: soft, non-tender; bowel sounds normal; no masses, no organomegaly.   Pulses: 2+ and symmetric.   Skin: Skin color, texture, turgor normal. No rashes or lesions.     CN II-XII grossly intact, alert and appropriate with conversation and following commands.   Gait is non-antalgic. Able to tandem walk. Able to walk on toes and heels without difficulty.   Cervical spine is tender to palpation over the right musculature. Appropriate range of motion of neck, not concerning for lhermitte's phenomenon.   Bilateral bicep 2/4 and tricep reflexes 1/4. Sensation intact throughout upper extremities.     UE muscle strength  Right  Left    Deltoid  5/5  5/5    Biceps  5/5  5/5    Triceps  4+/5  5/5    Hand intrinsics  5/5  5/5    Hand grasp  5/5  5/5    Denson signs  neg  neg      Lumbar spine is  tender to palpation.  Intact sensation throughout lower extremities.   Bilateral patellar 2/4 and achilles reflex 1/4.      LE muscle strength  Right  Left    Iliopsoas (hip flexion)  5/5  5/5    Quad (knee extension)  5/5  5/5    Hamstring (knee flexion)  5/5  5/5    Gastrocnemius (PF)  5/5  5/5    Tibialis Ant. (DF)  5/5  5/5    EHL  5/5  5/5      Negative Babinski bilaterally. Negative for clonus.   Calves are soft and non-tender bilaterally.       ASSESSMENT/PLAN:     lEiseo Oneal is a 57 year old male who presents to the clinic for consultation on a daily with fluctuating intensity, sharp, aching, burning pain that initiates in the midline cervical spine region and radiates distally in what sounds like the right C7 distribution. This pain is not accompanied by paresthesia or numbness but he does have perceived weakness. The patient's most recent imaging was reviewed with him today. It was explained that images show degenerative  change with multiple stenoses. On exam, he is noted to have diminished right triceps strength, but normal  sensation and range of motion. He has attempted conservative management with physical therapy, chiropractic care, NSAID's, a Medrol Dosepak and muscle relaxants, without resolution of symptoms.     Based on his physical exam, we do feel that it would be in his best interest to obtain a cervical spine MRI to further assess our concern for cervical stenosis, a bulging / herniated disk or other concerning pathology. Once the images have been obtained he has agreed to call our office back at [phone] to further discuss possible surgical interventions or other conservative therapies.      We also discussed signs of a worsening problem that he should seek being evaluated.        Respectfully,     NATALIYA Conway PA-C  [facility] Neurosurgery  [facility]  Tel: [phone]      Exam, imaging, and plan reviewed by Dr. Bull.       Again, thank you for allowing me to participate in the care of your patient.        Sincerely,        Robbie Larry PA-C

## 2022-10-18 NOTE — PATIENT INSTRUCTIONS
-Order placed for cervical spine MR imaging WO. Please call [facility] at [phone] to schedule the date, time, and location that is most convenient for you to obtain your imaging. Should [facility] be booked out too far, other options would include [facility] at [phone] as well as [facility] at [phone]. Once you have a scheduled MRI, please contact my office to schedule a follow-up appointment either over the phone or in person. You can contact my office at [phone].        NATALIYA Conway, DONALD  [facility] Neurosurgery  [facility]     Tel: [phone]  Fax: [phone]

## 2022-10-19 ENCOUNTER — ANCILLARY PROCEDURE (OUTPATIENT)
Dept: MRI IMAGING | Facility: CLINIC | Age: 57
End: 2022-10-19
Attending: PHYSICIAN ASSISTANT
Payer: COMMERCIAL

## 2022-10-19 DIAGNOSIS — S19.9XXA INJURY OF NECK, INITIAL ENCOUNTER: ICD-10-CM

## 2022-10-19 DIAGNOSIS — M54.12 CERVICAL RADICULOPATHY: ICD-10-CM

## 2022-10-19 PROCEDURE — 72141 MRI NECK SPINE W/O DYE: CPT

## 2022-10-20 ENCOUNTER — THERAPY VISIT (OUTPATIENT)
Dept: PHYSICAL THERAPY | Facility: CLINIC | Age: 57
End: 2022-10-20
Payer: COMMERCIAL

## 2022-10-20 DIAGNOSIS — S46.911A MUSCLE STRAIN OF RIGHT SCAPULAR REGION, INITIAL ENCOUNTER: ICD-10-CM

## 2022-10-20 DIAGNOSIS — S16.1XXA CERVICAL MYOFASCIAL STRAIN, INITIAL ENCOUNTER: Primary | ICD-10-CM

## 2022-10-20 PROCEDURE — 97110 THERAPEUTIC EXERCISES: CPT | Mod: GP | Performed by: PHYSICAL THERAPIST

## 2022-10-20 PROCEDURE — 97140 MANUAL THERAPY 1/> REGIONS: CPT | Mod: GP | Performed by: PHYSICAL THERAPIST

## 2022-10-20 NOTE — PROGRESS NOTES
Subjective:  HPI  Physical Exam                    Objective:  System    Physical Exam    General     ROS    Assessment/Plan:    SUBJECTIVE  Subjective changes as noted by pt:  Intensity of the pain varies.Today it is feeling better but yesterday was sore     Current pain level: 3/10     Changes in function:  Pt notes pain with driving. Pt is using left arm for most ADL's     Adverse reaction to treatment or activity:  None    OBJECTIVE  Changes in objective findings:  Pt notes pain with cervical extension and right lateral flexion.         ASSESSMENT  Eliseo continues to require intervention to meet STG and LTG's: PT  Patient's symptoms are resolving.  Response to therapy has shown an improvement in  pain level  Progress made towards STG/LTG?  Yes,     PLAN  Current treatment program is being advanced to more complex exercises.    PTA/ATC plan:  N/A    Please refer to the daily flowsheet for treatment today, total treatment time and time spent performing 1:1 timed codes.

## 2022-10-21 ENCOUNTER — TELEPHONE (OUTPATIENT)
Dept: NEUROSURGERY | Facility: CLINIC | Age: 57
End: 2022-10-21

## 2022-10-22 ENCOUNTER — HEALTH MAINTENANCE LETTER (OUTPATIENT)
Age: 57
End: 2022-10-22

## 2022-10-25 ENCOUNTER — VIRTUAL VISIT (OUTPATIENT)
Dept: NEUROSURGERY | Facility: CLINIC | Age: 57
End: 2022-10-25
Attending: PHYSICIAN ASSISTANT
Payer: COMMERCIAL

## 2022-10-25 DIAGNOSIS — M54.12 CERVICAL RADICULOPATHY: Primary | ICD-10-CM

## 2022-10-25 PROCEDURE — G0463 HOSPITAL OUTPT CLINIC VISIT: HCPCS | Mod: TEL,95

## 2022-10-25 PROCEDURE — 99212 OFFICE O/P EST SF 10 MIN: CPT | Mod: 95 | Performed by: PHYSICIAN ASSISTANT

## 2022-10-25 NOTE — LETTER
10/25/2022         RE: Eliseo Oneal  [address] [address] [address]        Dear Colleague,    Thank you for referring your patient, Eliseo Oneal, to the [facility] NEUROSURGERY CLINIC [place]. Please see a copy of my visit note below.    NEUROSURGERY PHONE PROGRESS NOTE    DATE OF VISIT: 10/25/2022    HPI:     Eliseo Oneal is a pleasant 57 year old male who I evaluated on 10/18/2022 and which time he was experiencing a daily with fluctuating intensity, sharp, aching, burning pain that initiates in the midline cervical spine region and would radiate distally in what sounds like the right C7 distribution. This pain is not accompanied by paresthesia or numbness but he does have perceived weakness. The patient's most recent imaging show degenerative change with multiple stenoses. On exam, he was noted to have diminished right triceps strength, but normal sensation and range of motion. He has attempted conservative management with physical therapy, chiropractic care, NSAID's, a Medrol Dosepak and muscle relaxants, without resolution of symptoms.   Subsequently we obtained a cervical spine MRI. He would like to discuss the results and treatment options.     Current Outpatient Medications   Medication     acetaminophen (TYLENOL) 325 MG tablet     cephALEXin (KEFLEX) 500 MG capsule     lisinopril (ZESTRIL) 40 MG tablet     methylPREDNISolone (MEDROL DOSEPAK) 4 MG tablet therapy pack     tiZANidine (ZANAFLEX) 2 MG tablet     No current facility-administered medications for this visit.       No Known Allergies    Past Medical History:   Diagnosis Date     No active medical problems        Review Of Systems    Skin: negative  Eyes: negative  Ears/Nose/Throat: negative  Respiratory: No shortness of breath, dyspnea on exertion, cough, or hemoptysis  Cardiovascular: negative  Gastrointestinal: negative  Musculoskeletal: neck pain and muscular weakness  Neurologic:  negative  Psychiatric: negative  Hematologic/Lymphatic/Immunologic: negative  Endocrine: negative    Imaging:    MR CERVICAL SPINE W/O CONTRAST - 10/19/2022 2:28 PM    1. No high-grade severe central spinal canal narrowing in the cervical or upper thoracic region. No intrinsic cord abnormality.  2. At the C5-C6 level there is a shallow broad-based disc and osteophyte complex greater on the right similar to prior CT. Mild cord deformity on the right. Central canal mildly narrowed but adequate with mild right foraminal narrowing and no left   foraminal narrowing.  3. At the C6-C7 level there is a broad-based disc and osteophyte complex greater on the left extending into the left foramen similar to the prior CT. There is also mild right-sided uncinate bony spurring. Mild cord deformity on the left. Mild central   canal narrowing. Mild to moderate right foraminal narrowing and moderate left foraminal narrowing.  4. At the C4-C5 level there is a shallow disc and osteophyte complex greater on the left. Mild central canal narrowing on the left with mild cord deformity on the left. No right foraminal narrowing and mild left foraminal narrowing.    Radiographic Findings: Full radiological report in chart. I personally reviewed the images with the patient today.    PLAN:    Mr. Oneal's most recent imaging exhibits, at the C6-C7 level, mild right-sided uncinate bony spurring with mild to moderate right foraminal narrowing which certainly correlate with his subjective concerns and objective findings on his physical exam. However, he is not interested in surgical intervention at this time.     Based on his physical exam, imaging review and past treatments, we feel that it would be in Mr. Oneal's best interest to continue a conservative approach by participating in his physical therapy program that is already in progress. He did inquire about possible treatment options that they may consider so we briefly discussed  stretching and strengthening exercises in conjunction with cervical traction as possibilities. If the traction proves to provide alleviation, it would be appropriate to issue a home device.     We also discussed the possibility of obtaining an epidural steroid injection, which he would to avoid for now.    We would like to see him back in three months if needed to further discuss other conservative options or possible surgical interventions. In the event that patient's symptoms worsen or change we would like to see him sooner.     We did review the images together and we explained his condition and the recommended treatment. We also discussed signs of a worsening problem that he should seek being evaluated.     The patient gave verbal understanding and is in agreement with the above plan. He  will call or return to the clinic for any worsening or changes in symptoms.      Respectfully,     NATALIYA Keating PA-C    Phone conversation lasted 12 minutes.      Again, thank you for allowing me to participate in the care of your patient.        Sincerely,        Robbie Larry PA-C

## 2022-10-25 NOTE — PROGRESS NOTES
NEUROSURGERY PHONE PROGRESS NOTE    DATE OF VISIT: 10/25/2022    HPI:     Eliseo Oneal is a pleasant 57 year old male who I evaluated on 10/18/2022 and which time he was experiencing a daily with fluctuating intensity, sharp, aching, burning pain that initiates in the midline cervical spine region and would radiate distally in what sounds like the right C7 distribution. This pain is not accompanied by paresthesia or numbness but he does have perceived weakness. The patient's most recent imaging show degenerative change with multiple stenoses. On exam, he was noted to have diminished right triceps strength, but normal sensation and range of motion. He has attempted conservative management with physical therapy, chiropractic care, NSAID's, a Medrol Dosepak and muscle relaxants, without resolution of symptoms.   Subsequently we obtained a cervical spine MRI. He would like to discuss the results and treatment options.     Current Outpatient Medications   Medication     acetaminophen (TYLENOL) 325 MG tablet     cephALEXin (KEFLEX) 500 MG capsule     lisinopril (ZESTRIL) 40 MG tablet     methylPREDNISolone (MEDROL DOSEPAK) 4 MG tablet therapy pack     tiZANidine (ZANAFLEX) 2 MG tablet     No current facility-administered medications for this visit.       No Known Allergies    Past Medical History:   Diagnosis Date     No active medical problems        Review Of Systems    Skin: negative  Eyes: negative  Ears/Nose/Throat: negative  Respiratory: No shortness of breath, dyspnea on exertion, cough, or hemoptysis  Cardiovascular: negative  Gastrointestinal: negative  Musculoskeletal: neck pain and muscular weakness  Neurologic: negative  Psychiatric: negative  Hematologic/Lymphatic/Immunologic: negative  Endocrine: negative    Imaging:    MR CERVICAL SPINE W/O CONTRAST - 10/19/2022 2:28 PM    1. No high-grade severe central spinal canal narrowing in the cervical or upper thoracic region. No intrinsic cord  abnormality.  2. At the C5-C6 level there is a shallow broad-based disc and osteophyte complex greater on the right similar to prior CT. Mild cord deformity on the right. Central canal mildly narrowed but adequate with mild right foraminal narrowing and no left   foraminal narrowing.  3. At the C6-C7 level there is a broad-based disc and osteophyte complex greater on the left extending into the left foramen similar to the prior CT. There is also mild right-sided uncinate bony spurring. Mild cord deformity on the left. Mild central   canal narrowing. Mild to moderate right foraminal narrowing and moderate left foraminal narrowing.  4. At the C4-C5 level there is a shallow disc and osteophyte complex greater on the left. Mild central canal narrowing on the left with mild cord deformity on the left. No right foraminal narrowing and mild left foraminal narrowing.    Radiographic Findings: Full radiological report in chart. I personally reviewed the images with the patient today.    PLAN:    Mr. Oneal's most recent imaging exhibits, at the C6-C7 level, mild right-sided uncinate bony spurring with mild to moderate right foraminal narrowing which certainly correlate with his subjective concerns and objective findings on his physical exam. However, he is not interested in surgical intervention at this time.     Based on his physical exam, imaging review and past treatments, we feel that it would be in Mr. Oneal's best interest to continue a conservative approach by participating in his physical therapy program that is already in progress. He did inquire about possible treatment options that they may consider so we briefly discussed stretching and strengthening exercises in conjunction with cervical traction as possibilities. If the traction proves to provide alleviation, it would be appropriate to issue a home device.     We also discussed the possibility of obtaining an epidural steroid injection, which he  would to avoid for now.    We would like to see him back in three months if needed to further discuss other conservative options or possible surgical interventions. In the event that patient's symptoms worsen or change we would like to see him sooner.     We did review the images together and we explained his condition and the recommended treatment. We also discussed signs of a worsening problem that he should seek being evaluated.     The patient gave verbal understanding and is in agreement with the above plan. He  will call or return to the clinic for any worsening or changes in symptoms.      Respectfully,     NATALIYA Keating PA-C    Phone conversation lasted 12 minutes.

## 2022-10-27 ENCOUNTER — THERAPY VISIT (OUTPATIENT)
Dept: PHYSICAL THERAPY | Facility: CLINIC | Age: 57
End: 2022-10-27
Payer: COMMERCIAL

## 2022-10-27 DIAGNOSIS — S46.911A MUSCLE STRAIN OF RIGHT SCAPULAR REGION, INITIAL ENCOUNTER: ICD-10-CM

## 2022-10-27 DIAGNOSIS — S16.1XXA CERVICAL MYOFASCIAL STRAIN, INITIAL ENCOUNTER: Primary | ICD-10-CM

## 2022-10-27 PROCEDURE — 97140 MANUAL THERAPY 1/> REGIONS: CPT | Mod: GP | Performed by: PHYSICAL THERAPIST

## 2022-10-27 PROCEDURE — 97010 HOT OR COLD PACKS THERAPY: CPT | Mod: GP | Performed by: PHYSICAL THERAPIST

## 2022-10-27 PROCEDURE — 97110 THERAPEUTIC EXERCISES: CPT | Mod: GP | Performed by: PHYSICAL THERAPIST

## 2022-11-23 ENCOUNTER — OFFICE VISIT (OUTPATIENT)
Dept: FAMILY MEDICINE | Facility: CLINIC | Age: 57
End: 2022-11-23
Payer: COMMERCIAL

## 2022-11-23 VITALS
WEIGHT: 261.4 LBS | TEMPERATURE: 98.1 F | HEIGHT: 70 IN | SYSTOLIC BLOOD PRESSURE: 168 MMHG | HEART RATE: 63 BPM | BODY MASS INDEX: 37.42 KG/M2 | OXYGEN SATURATION: 96 % | DIASTOLIC BLOOD PRESSURE: 94 MMHG

## 2022-11-23 DIAGNOSIS — E04.1 THYROID NODULE: Primary | ICD-10-CM

## 2022-11-23 DIAGNOSIS — I10 HYPERTENSION GOAL BP (BLOOD PRESSURE) < 140/90: ICD-10-CM

## 2022-11-23 DIAGNOSIS — Z12.11 SCREEN FOR COLON CANCER: ICD-10-CM

## 2022-11-23 DIAGNOSIS — Z11.4 SCREENING FOR HIV (HUMAN IMMUNODEFICIENCY VIRUS): ICD-10-CM

## 2022-11-23 PROCEDURE — 80048 BASIC METABOLIC PNL TOTAL CA: CPT | Performed by: PHYSICIAN ASSISTANT

## 2022-11-23 PROCEDURE — 84443 ASSAY THYROID STIM HORMONE: CPT | Performed by: PHYSICIAN ASSISTANT

## 2022-11-23 PROCEDURE — 99214 OFFICE O/P EST MOD 30 MIN: CPT | Mod: 25 | Performed by: PHYSICIAN ASSISTANT

## 2022-11-23 PROCEDURE — 90715 TDAP VACCINE 7 YRS/> IM: CPT | Performed by: PHYSICIAN ASSISTANT

## 2022-11-23 PROCEDURE — 90471 IMMUNIZATION ADMIN: CPT | Performed by: PHYSICIAN ASSISTANT

## 2022-11-23 PROCEDURE — 36415 COLL VENOUS BLD VENIPUNCTURE: CPT | Performed by: PHYSICIAN ASSISTANT

## 2022-11-23 RX ORDER — AMLODIPINE BESYLATE 5 MG/1
5 TABLET ORAL DAILY
Qty: 30 TABLET | Refills: 1 | Status: SHIPPED | OUTPATIENT
Start: 2022-11-23 | End: 2023-01-23

## 2022-11-23 RX ORDER — LISINOPRIL 40 MG/1
TABLET ORAL
Qty: 30 TABLET | Refills: 1 | Status: SHIPPED | OUTPATIENT
Start: 2022-11-23 | End: 2023-01-23

## 2022-11-23 NOTE — PROGRESS NOTES
"  Assessment & Plan     Screen for colon cancer    - Colonoscopy Screening  Referral; Future      Thyroid nodule  We will get thyroid lab and ordered an ultrasound for his thyroid and will refer to endocrinology  - TSH with free T4 reflex  - US Thyroid; Future  - Adult Endocrinology  Referral; Future    Hypertension goal BP (blood pressure) < 140/90  Blood pressure very elevated today.  We will get a metabolic panel and discussed adding amlodipine 5 mg to the lisinopril 40 mg.  Risks, benefits, side effects and intended purposes discussed.     Will have come in for nurse only BP check x 2 and then follow-up for office visit in 1 month with BP diary.    - amLODIPine (NORVASC) 5 MG tablet; Take 1 tablet (5 mg) by mouth daily  - Basic metabolic panel  - lisinopril (ZESTRIL) 40 MG tablet; TAKE ONE TABLET BY MOUTH EVERY DAY *NEED APPOINTMENT FOR FURTHER FILLS* Strength: 40 mg      BMI:   Estimated body mass index is 37.51 kg/m  as calculated from the following:    Height as of this encounter: 1.778 m (5' 10\").    Weight as of this encounter: 118.6 kg (261 lb 6.4 oz).           Return in about 6 years (around 11/23/2028) for BP Recheck.    Ramona Ann Aaseby-Aguilera, PA-C  [facility] SHELBY Whitman is a 57 year old, presenting for the following health issues:  Results (Follow up from CT scan of thyroid nodule.)    Eliseo was seen for cervical radiculopathy and had a CT of his cervical spine that showed that he had a 2 cm thyroid nodule and was told to follow-up for that.    Also, he has additional complaints of blood pressure very elevated today.  He states that he does use a CPAP and has been on lisinopril 40 mg.  14 tablets were recently called and.  He states that he has not really been seen since 2020.    History of Present Illness       Reason for visit:  Nodule  Symptom onset:  More than a month  Had these symptoms before:  No    He eats 2-3 servings of fruits and " "vegetables daily.He consumes 1 sweetened beverage(s) daily.He exercises with enough effort to increase his heart rate 30 to 60 minutes per day.  He exercises with enough effort to increase his heart rate 3 or less days per week.   He is taking medications regularly.       Concern - Follow up to MRI and CT scan results of thyroid nodule.  Onset: MR done on 10/19/22 and CT done on 10/13/22      Review of Systems   Constitutional, HEENT, cardiovascular, pulmonary, gi and gu systems are negative, except as otherwise noted.      Objective    BP (!) 168/94 (BP Location: Right arm, Patient Position: Sitting, Cuff Size: Adult Large)   Pulse 63   Temp 98.1  F (36.7  C) (Oral)   Ht 1.778 m (5' 10\")   Wt 118.6 kg (261 lb 6.4 oz)   SpO2 96%   BMI 37.51 kg/m    Body mass index is 37.51 kg/m .  Physical Exam   GENERAL: healthy, alert and no distress  HENT: ear canals and TM's normal, nose and mouth without ulcers or lesions  RESP: lungs clear to auscultation - no rales, rhonchi or wheezes  CV: regular rate and rhythm, normal S1 S2, no S3 or S4, no murmur, click or rub, no peripheral edema and peripheral pulses strong  MS: no gross musculoskeletal defects noted, no edema                    "

## 2022-11-23 NOTE — PATIENT INSTRUCTIONS
(E04.1) Thyroid nodule  (primary encounter diagnosis)  Comment: thyroid nodule found on ct  Well get tsh and US and refer to endo  Plan: TSH with free T4 reflex, US Thyroid, Adult         Endocrinology  Referral            (Z12.11) Screen for colon cancer  Comment:   Plan: Colonoscopy Screening  Referral            (I10) Hypertension goal BP (blood pressure) < 140/90  Comment: bp elevated.  Will add amlodipine to lisinopril.   Will have come in for nurse only BP check x 2 and then follow-up for office visit in 1 month with BP diary.      Plan: amLODIPine (NORVASC) 5 MG tablet, Basic         metabolic panel, lisinopril (ZESTRIL) 40 MG         tablet

## 2022-11-25 LAB
ANION GAP SERPL CALCULATED.3IONS-SCNC: 10 MMOL/L (ref 7–15)
BUN SERPL-MCNC: 20.5 MG/DL (ref 8–23)
CALCIUM SERPL-MCNC: 8.9 MG/DL (ref 8.6–10)
CHLORIDE SERPL-SCNC: 107 MMOL/L (ref 98–107)
CREAT SERPL-MCNC: 1.12 MG/DL (ref 0.67–1.17)
DEPRECATED HCO3 PLAS-SCNC: 24 MMOL/L (ref 22–29)
GFR SERPL CREATININE-BSD FRML MDRD: 76 ML/MIN/1.73M2
GLUCOSE SERPL-MCNC: 84 MG/DL (ref 70–99)
POTASSIUM SERPL-SCNC: 4.1 MMOL/L (ref 3.4–5.3)
SODIUM SERPL-SCNC: 141 MMOL/L (ref 136–145)
TSH SERPL DL<=0.005 MIU/L-ACNC: 0.88 UIU/ML (ref 0.3–4.2)

## 2022-12-12 ENCOUNTER — ALLIED HEALTH/NURSE VISIT (OUTPATIENT)
Dept: FAMILY MEDICINE | Facility: CLINIC | Age: 57
End: 2022-12-12
Payer: COMMERCIAL

## 2022-12-12 VITALS — DIASTOLIC BLOOD PRESSURE: 86 MMHG | SYSTOLIC BLOOD PRESSURE: 138 MMHG

## 2022-12-12 DIAGNOSIS — Z01.30 BP CHECK: Primary | ICD-10-CM

## 2022-12-12 PROCEDURE — 99207 PR NO CHARGE NURSE ONLY: CPT

## 2022-12-12 NOTE — PROGRESS NOTES
Subjective:Subjective changes as noted by pt:  Intensity of the pain varies.Today it is feeling better but yesterday was sore     Current pain level: 3/10     Changes in function:  Pt notes pain with driving. Pt is using left arm for most ADL's     Adverse reaction to treatment or activity:  None     OBJECTIVE  Changes in objective findings:  Pt notes pain with cervical extension and right lateral flexion.   HPI  Physical Exam                    Objective:  System    Physical Exam    General     ROS    Assessment/Plan:    ASSESSMENT/PLAN  Updated problem list and treatment plan: Diagnosis 1:  Right cervical myofascial pain  Pain -  hot/cold therapy, self management, education and home program  Decreased ROM/flexibility - manual therapy, therapeutic exercise, therapeutic activity and home program  Decreased strength - therapeutic exercise, therapeutic activities and home program  Progress toward STG/LTGs have been made:  Yes,   Assessment of Progress: The patient's condition has potential to improve.  Self Management Plans:  Patient has been instructed in a home treatment program.  I have re-evaluated this patient and find that the nature, scope, duration and intensity of the therapy is appropriate for the medical condition of the patient.  Eliseo continues to require the following intervention to meet STG and LT's:  PT    Recommendations:  Pt has not returned for treatment since 10-27-22. Pt discharged at this time.      Please refer to the daily flowsheet for treatment today, total treatment time and time spent performing 1:1 timed codes.

## 2022-12-12 NOTE — PROGRESS NOTES
I met with Eliseo Oneal to check his blood pressure.  Blood pressure medications on the med list were reviewed with patient.    Patient has taken all medications as per usual regimen: Yes  Patient reports tolerating them without any issues or concerns: Yes    Vitals:    12/12/22 0837 12/12/22 0850   BP: (!) 150/92 138/86   BP Location: Right arm Right arm   Patient Position: Sitting Sitting   Cuff Size: Adult Regular Adult Regular       After 5 minutes, the patient's blood pressure was <140/90, the previous encounter was reviewed, recorded blood pressure below 140/90. Patient was discharged and the note will be sent to the provider for final review.    Sonny SHERIDAN RN

## 2023-01-19 NOTE — TELEPHONE ENCOUNTER
Left non-detailed message for patient to call back.  Please schedule office visit, labs and NANDO check  when patient calls back.  (see previous notes for details)    I have been unable to reach this patient by phone.  A letter is being sent to the last known home address.    Thanks Leila     No

## 2023-01-22 DIAGNOSIS — I10 HYPERTENSION GOAL BP (BLOOD PRESSURE) < 140/90: ICD-10-CM

## 2023-01-22 NOTE — LETTER
January 26, 2023      Eliseo Oneal  [id number] [address] [id number]        Eliseo Oneal      We recently received a refill request for your medications. We have sent in a refill for you to your pharmacy.    Our records show you are due for a follow up visit with your provider.     Please call the clinic at [phone] to schedule as the providers are booking out.        Sincerely,        Michelle Mccauley/

## 2023-01-23 RX ORDER — LISINOPRIL 40 MG/1
TABLET ORAL
Qty: 30 TABLET | Refills: 0 | Status: SHIPPED | OUTPATIENT
Start: 2023-01-23 | End: 2023-02-21

## 2023-01-23 RX ORDER — AMLODIPINE BESYLATE 5 MG/1
TABLET ORAL
Qty: 30 TABLET | Refills: 0 | Status: SHIPPED | OUTPATIENT
Start: 2023-01-23 | End: 2023-02-21

## 2023-01-23 NOTE — TELEPHONE ENCOUNTER
[place]  Supposed to be 6 weeks.    He needs office visit stat. And was supposed to come in for nurse only bp checks.  Will fill for 1 month and no more refills

## 2023-02-21 DIAGNOSIS — I10 HYPERTENSION GOAL BP (BLOOD PRESSURE) < 140/90: ICD-10-CM

## 2023-02-21 RX ORDER — LISINOPRIL 40 MG/1
TABLET ORAL
Qty: 30 TABLET | Refills: 0 | Status: SHIPPED | OUTPATIENT
Start: 2023-02-21 | End: 2023-04-05

## 2023-02-21 RX ORDER — AMLODIPINE BESYLATE 5 MG/1
TABLET ORAL
Qty: 30 TABLET | Refills: 0 | Status: SHIPPED | OUTPATIENT
Start: 2023-02-21 | End: 2023-04-05

## 2023-02-21 NOTE — TELEPHONE ENCOUNTER
Routing refill request to provider for review/approval because:  Was advised to follow up per provider request with last refill and pt has not scheduled.    Leidy SULLIVAN RN

## 2023-03-17 DIAGNOSIS — I10 HYPERTENSION GOAL BP (BLOOD PRESSURE) < 140/90: ICD-10-CM

## 2023-03-20 ENCOUNTER — VIRTUAL VISIT (OUTPATIENT)
Dept: ENDOCRINOLOGY | Facility: CLINIC | Age: 58
End: 2023-03-20
Attending: PHYSICIAN ASSISTANT
Payer: COMMERCIAL

## 2023-03-20 DIAGNOSIS — E04.1 THYROID NODULE: ICD-10-CM

## 2023-03-20 PROCEDURE — 99204 OFFICE O/P NEW MOD 45 MIN: CPT | Mod: VID | Performed by: INTERNAL MEDICINE

## 2023-03-20 RX ORDER — LISINOPRIL 40 MG/1
TABLET ORAL
Qty: 30 TABLET | Refills: 0 | OUTPATIENT
Start: 2023-03-20

## 2023-03-20 RX ORDER — AMLODIPINE BESYLATE 5 MG/1
TABLET ORAL
Qty: 30 TABLET | Refills: 0 | OUTPATIENT
Start: 2023-03-20

## 2023-03-20 NOTE — LETTER
"    3/20/2023         RE: Eliseo Oneal  [id number] [address] [address]        Dear Colleague,    Thank you for referring your patient, Eliseo Oneal, to the [facility]. Please see a copy of my visit note below.    THIS IS A VIDEO VISIT:    Phone call visit/virtual visit encounter:    Name of patient: Eliseo Oneal    Date of encounter: 3/20/2023    Time of start of video visit: 11:32    Video started: 11:38    Video ended: 11:49    Provider location: working from home/ [facility]    Patient location: patients home.    Mode of transmission: [employer] video/ Project Manager    Verbal consent: obtained before starting visit. Pt is agreeable.      The patient has been notified of following:      \"This VIDEO visit will be conducted via a call between you and your physician/provider. We have found that certain health care needs can be provided without the need for a physical exam.  This service lets us provide the care you need with a short phone conversation.  If a prescription is necessary we can send it directly to your pharmacy.  If lab work is needed we can place an order for that and you can then stop by our lab to have the test done at a later time.     With new updates with corona virus patient might be billed as clinic visit.     If during the course of the call the physician/provider feels a telephone visit is not appropriate, you will not be charged for this service.\"      Past medical history, social history, family history, allergy and medications were reviewed and updated as appropriate.  Reviewed pertinent labs, notes, imaging studies personally.      Name: Eliseo Oneal  Seen in consultation with Franco Almazan for thyroid nodule.   HPI:  Eliseo Oneal is a 57 year old male who presents for the evaluation of thyroid nodule.    CT neck 10/2022 showed Incidental left thyroid nodule measuring over 2 cm.  Thyroid US ordered by PCP but not " done.    No FH of thyroid cancer  No history of radiation  No compressive s/s  Thyroid labs in acceptable range.  He is not on thyroid hormone replacement.  No other major risk factors.    Palpitations:  No  Changes to hair or skin: No  Diarrhea/Constipation:No  Changes in vision:No  Dysphagia or Shortness of breath:No  Tremors:No    PMH/PSH:  Past Medical History:   Diagnosis Date     No active medical problems      Past Surgical History:   Procedure Laterality Date     NO HISTORY OF SURGERY       Family Hx:  Family History   Problem Relation Age of Onset     Diabetes Father 58        Type II diabetes     Cerebrovascular Disease Father      Diabetes Paternal Grandmother      Colon Cancer No family hx of      Prostate Cancer No family hx of      Coronary Artery Disease No family hx of      Hypertension No family hx of      Hyperlipidemia No family hx of      Breast Cancer No family hx of      Thyroid Disease No family hx of      Thyroid disease: No           Social Hx:  Social History     Socioeconomic History     Marital status:      Spouse name: Not on file     Number of children: Not on file     Years of education: Not on file     Highest education level: Not on file   Occupational History     Not on file   Tobacco Use     Smoking status: Never     Smokeless tobacco: Never   Vaping Use     Vaping Use: Never used   Substance and Sexual Activity     Alcohol use: Never     Drug use: Never     Sexual activity: Yes     Partners: Female   Other Topics Concern     Parent/sibling w/ CABG, MI or angioplasty before 65F 55M? No   Social History Narrative     Not on file     Social Determinants of Health     Financial Resource Strain: Not on file   Food Insecurity: Not on file   Transportation Needs: Not on file   Physical Activity: Not on file   Stress: Not on file   Social Connections: Not on file   Intimate Partner Violence: Not on file   Housing Stability: Not on file          MEDICATIONS:  has a current  medication list which includes the following prescription(s): acetaminophen, amlodipine, lisinopril, and tizanidine.    Review of Systems  10 point ROS neg other than the symptoms noted above in the HPI.    Physical Exam   VS: There were no vitals taken for this visit.  GENERAL: healthy, alert and no distress  EYES: Eyes grossly normal to inspection, conjunctivae and sclerae normal  ENT: no nose swelling, nasal discharge.  Thyroid: no apparent thyroid nodules  RESP: no audible wheeze, cough, or visible cyanosis.  No visible retractions or increased work of breathing.  Able to speak fully in complete sentences.  ABDO: not evaluated.  EXTREMITIES: no hand tremors.  NEURO: Cranial nerves grossly intact, mentation intact and speech normal  SKIN: No apparent skin lesions, rash or edema seen   PSYCH: mentation appears normal, affect normal/bright, judgement and insight intact, normal speech and appearance well-groomed    LABS:  TFTs:  ENDO THYROID LABS-[facility] Latest Ref Rng & Units 11/23/2022   THYROID STIMULATING HORMONE (R) 0.30 - 4.20 uIU/mL 0.88     CT neck:  Incidental left thyroid nodule measuring over 2 cm for which  ultrasound correlation would be typically recommended if not  previously performed.    Thyroid Ultrasound:    All pertinent notes, labs, and images personally reviewed by me.     A/P  Mr.David NATALIA Oneal is a 57 year old here for the evaluation of thyroid nodule:  #1 Thyroid Nodule:  No FH of thyroid cancer  No history of radiation  No compressive s/s  Thyroid labs in acceptable range.  He is not on thyroid hormone replacement.  No other major risk factors.  Plan:  Discussed diagnosis, pathophysiology, management and treatment options of condition with pt.  Thyroid ultrasound with Radiology.  Consider biopsy based on that.  Discussed FNA in general.    Discussed possible outcomes of biopsy including possible benign, possible malignancy and possible AUS. If AUS indication for molecular marker  testing.  Discussed possible compressive symptoms and signs to watch for.    Thyroid nodules are common and are frequently benign. Data suggest that the prevalence of palpable thyroid nodules is 3% to 7% in [place]; the prevalence is as high as 50% based on ultrasonography (US) or autopsy data. All patients with a palpable thyroid nodule, however, should undergo US examination. US-guided FNA (US-FNA) is recommended for nodules ?10 mm; US-FNA is suggested for nodules <10 mm only if clinical information or US features are suspicious.  The frequency of thyroid nodules in general population was discussed. Also discussed possibility of malignancy, potential for thyroid autonomy.       Causes of thyroid Nodules: Benign (Multinodular goiter, Hashimoto s thyroiditis, Simple or hemorrhagic cysts, Follicular adenomas, Subacute thyroiditis) or Malignant(Papillary carcinoma, Follicular carcinoma, Hürthle cell carcinoma, Medullary carcinoma, Anaplastic carcinoma, Primary thyroid lymphoma, or Metastatic malignant lesion).    MultiNodule:  The risk of cancer is not significantly higher in palpable solitary thyroid nodules than in multinodular lesions or in nodules in diffuse goiters. In multinodular thyroid glands, the cytologic sampling should be focused on lesions characterized by suspicious US features rather than on larger or clinically dominant nodules.    Cyst:  Most complex thyroid nodules with a dominant fluid component are benign. USFNA, however, should always be performed because the rare papillary thyroid carcinoma (PTC) can be cystic. An unsatisfactory (nondiagnostic) specimen usually results from a cystic nodule that yields few or no follicular cells. Reaspiration yields satisfactory results in 50% of cases.    All questions were answered.  The patient indicates understanding of the above issues and agrees with the plan set forth.    Follow-up:  Based on US.    Kimberlyn Brooks MD  Endocrinology   [facility]    Cc: Franco Almazan    Addendum to above note and clinic visit:    Labs reviewed.    See result note/telephone encounter.        Answers for HPI/ROS submitted by the patient on 3/17/2023  General Symptoms: No  Skin Symptoms: No  HENT Symptoms: No  EYE SYMPTOMS: No  HEART SYMPTOMS: No  LUNG SYMPTOMS: No  INTESTINAL SYMPTOMS: No  URINARY SYMPTOMS: No  REPRODUCTIVE SYMPTOMS: No  SKELETAL SYMPTOMS: No  BLOOD SYMPTOMS: No  NERVOUS SYSTEM SYMPTOMS: No  MENTAL HEALTH SYMPTOMS: No          Again, thank you for allowing me to participate in the care of your patient.        Sincerely,        Kimberlyn Brooks MD

## 2023-03-20 NOTE — PROGRESS NOTES
"THIS IS A VIDEO VISIT:    Phone call visit/virtual visit encounter:    Name of patient: Eliseo Oneal    Date of encounter: 3/20/2023    Time of start of video visit: 11:32    Video started: 11:38    Video ended: 11:49    Provider location: working from home/ [facility]    Patient location: patients home.    Mode of transmission: [employer] video/ [employer]    Verbal consent: obtained before starting visit. Pt is agreeable.      The patient has been notified of following:      \"This VIDEO visit will be conducted via a call between you and your physician/provider. We have found that certain health care needs can be provided without the need for a physical exam.  This service lets us provide the care you need with a short phone conversation.  If a prescription is necessary we can send it directly to your pharmacy.  If lab work is needed we can place an order for that and you can then stop by our lab to have the test done at a later time.     With new updates with corona virus patient might be billed as clinic visit.     If during the course of the call the physician/provider feels a telephone visit is not appropriate, you will not be charged for this service.\"      Past medical history, social history, family history, allergy and medications were reviewed and updated as appropriate.  Reviewed pertinent labs, notes, imaging studies personally.      Name: Eliseo Oneal  Seen in consultation with Franco Almazan for thyroid nodule.   HPI:  Eliseo Oneal is a 57 year old male who presents for the evaluation of thyroid nodule.    CT neck 10/2022 showed Incidental left thyroid nodule measuring over 2 cm.  Thyroid US ordered by PCP but not done.    No FH of thyroid cancer  No history of radiation  No compressive s/s  Thyroid labs in acceptable range.  He is not on thyroid hormone replacement.  No other major risk factors.    Palpitations:  No  Changes to hair or skin: No  Diarrhea/Constipation:No  Changes " in vision:No  Dysphagia or Shortness of breath:No  Tremors:No    PMH/PSH:  Past Medical History:   Diagnosis Date     No active medical problems      Past Surgical History:   Procedure Laterality Date     NO HISTORY OF SURGERY       Family Hx:  Family History   Problem Relation Age of Onset     Diabetes Father 58        Type II diabetes     Cerebrovascular Disease Father      Diabetes Paternal Grandmother      Colon Cancer No family hx of      Prostate Cancer No family hx of      Coronary Artery Disease No family hx of      Hypertension No family hx of      Hyperlipidemia No family hx of      Breast Cancer No family hx of      Thyroid Disease No family hx of      Thyroid disease: No           Social Hx:  Social History     Socioeconomic History     Marital status:      Spouse name: Not on file     Number of children: Not on file     Years of education: Not on file     Highest education level: Not on file   Occupational History     Not on file   Tobacco Use     Smoking status: Never     Smokeless tobacco: Never   Vaping Use     Vaping Use: Never used   Substance and Sexual Activity     Alcohol use: Never     Drug use: Never     Sexual activity: Yes     Partners: Female   Other Topics Concern     Parent/sibling w/ CABG, MI or angioplasty before 65F 55M? No   Social History Narrative     Not on file     Social Determinants of Health     Financial Resource Strain: Not on file   Food Insecurity: Not on file   Transportation Needs: Not on file   Physical Activity: Not on file   Stress: Not on file   Social Connections: Not on file   Intimate Partner Violence: Not on file   Housing Stability: Not on file          MEDICATIONS:  has a current medication list which includes the following prescription(s): acetaminophen, amlodipine, lisinopril, and tizanidine.    Review of Systems  10 point ROS neg other than the symptoms noted above in the HPI.    Physical Exam   VS: There were no vitals taken for this visit.  GENERAL:  healthy, alert and no distress  EYES: Eyes grossly normal to inspection, conjunctivae and sclerae normal  ENT: no nose swelling, nasal discharge.  Thyroid: no apparent thyroid nodules  RESP: no audible wheeze, cough, or visible cyanosis.  No visible retractions or increased work of breathing.  Able to speak fully in complete sentences.  ABDO: not evaluated.  EXTREMITIES: no hand tremors.  NEURO: Cranial nerves grossly intact, mentation intact and speech normal  SKIN: No apparent skin lesions, rash or edema seen   PSYCH: mentation appears normal, affect normal/bright, judgement and insight intact, normal speech and appearance well-groomed    LABS:  TFTs:  ENDO THYROID LABS-[facility] Latest Ref Rng & Units 11/23/2022   THYROID STIMULATING HORMONE (R) 0.30 - 4.20 uIU/mL 0.88     CT neck:  Incidental left thyroid nodule measuring over 2 cm for which  ultrasound correlation would be typically recommended if not  previously performed.    Thyroid Ultrasound:    All pertinent notes, labs, and images personally reviewed by me.     A/P  Mr.David NATALIA Oneal is a 57 year old here for the evaluation of thyroid nodule:  #1 Thyroid Nodule:  No FH of thyroid cancer  No history of radiation  No compressive s/s  Thyroid labs in acceptable range.  He is not on thyroid hormone replacement.  No other major risk factors.  Plan:  Discussed diagnosis, pathophysiology, management and treatment options of condition with pt.  Thyroid ultrasound with Radiology.  Consider biopsy based on that.  Discussed FNA in general.    Discussed possible outcomes of biopsy including possible benign, possible malignancy and possible AUS. If AUS indication for molecular marker testing.  Discussed possible compressive symptoms and signs to watch for.    Thyroid nodules are common and are frequently benign. Data suggest that the prevalence of palpable thyroid nodules is 3% to 7% in [place]; the prevalence is as high as 50% based on ultrasonography (US) or  autopsy data. All patients with a palpable thyroid nodule, however, should undergo US examination. US-guided FNA (US-FNA) is recommended for nodules ?10 mm; US-FNA is suggested for nodules <10 mm only if clinical information or US features are suspicious.  The frequency of thyroid nodules in general population was discussed. Also discussed possibility of malignancy, potential for thyroid autonomy.       Causes of thyroid Nodules: Benign (Multinodular goiter, Hashimoto s thyroiditis, Simple or hemorrhagic cysts, Follicular adenomas, Subacute thyroiditis) or Malignant(Papillary carcinoma, Follicular carcinoma, Hürthle cell carcinoma, Medullary carcinoma, Anaplastic carcinoma, Primary thyroid lymphoma, or Metastatic malignant lesion).    MultiNodule:  The risk of cancer is not significantly higher in palpable solitary thyroid nodules than in multinodular lesions or in nodules in diffuse goiters. In multinodular thyroid glands, the cytologic sampling should be focused on lesions characterized by suspicious US features rather than on larger or clinically dominant nodules.    Cyst:  Most complex thyroid nodules with a dominant fluid component are benign. USFNA, however, should always be performed because the rare papillary thyroid carcinoma (PTC) can be cystic. An unsatisfactory (nondiagnostic) specimen usually results from a cystic nodule that yields few or no follicular cells. Reaspiration yields satisfactory results in 50% of cases.    All questions were answered.  The patient indicates understanding of the above issues and agrees with the plan set forth.    Follow-up:  Based on US.    Kimberlyn Brooks MD  Endocrinology   [facility]    Cc: Franco Almazan    Addendum to above note and clinic visit:    Labs reviewed.    See result note/telephone encounter.        Answers for HPI/ROS submitted by the patient on 3/17/2023  General Symptoms: No  Skin Symptoms: No  HENT Symptoms: No  EYE SYMPTOMS:  No  HEART SYMPTOMS: No  LUNG SYMPTOMS: No  INTESTINAL SYMPTOMS: No  URINARY SYMPTOMS: No  REPRODUCTIVE SYMPTOMS: No  SKELETAL SYMPTOMS: No  BLOOD SYMPTOMS: No  NERVOUS SYSTEM SYMPTOMS: No  MENTAL HEALTH SYMPTOMS: No

## 2023-03-20 NOTE — TELEPHONE ENCOUNTER
Pt has been given brandon refill x3.  He has been contacted via phone, letter and [email or web address] for follow up. Pt notes he was to follow up in  6 wks from November and has not.  [employer] messages have been read.  Per RN protocol can refill until November.    Please advise  Ck PATEL RN, BSN

## 2023-03-20 NOTE — PATIENT INSTRUCTIONS
-[facility]  Dr Brooks, Endocrinology Department    [place] [address] [facility]  [place] [address]  Appointment [phone]  Fax: [phone]  [place]: Monday - Thursday      Thyroid ultrasound with Radiology.  Consider biopsy based on that.     [facility] radiology scheduleing   [place]  [phone]   [facility] Radiology scheduling  [place]  [phone]     Please call and schedule the recommended test as discussed in clinic visit. These are the numbers to call.      
81

## 2023-03-20 NOTE — NURSING NOTE
Is the patient currently in the state of [place]? YES    Visit mode:VIDEO    If the visit is dropped, the patient can be reconnected by: TELEPHONE VISIT: Phone number: [phone]    Will anyone else be joining the visit? NO    How would you like to obtain your AVS? MyChart    Are changes needed to the allergy or medication list? NO    Reason for visit:   Chief Complaint   Patient presents with     Video Visit     Thyroid Nodule       Jenn Gray MA

## 2023-03-22 ENCOUNTER — TELEPHONE (OUTPATIENT)
Dept: ENDOCRINOLOGY | Facility: CLINIC | Age: 58
End: 2023-03-22
Payer: COMMERCIAL

## 2023-04-06 ENCOUNTER — HOSPITAL ENCOUNTER (OUTPATIENT)
Dept: ULTRASOUND IMAGING | Facility: CLINIC | Age: 58
Discharge: HOME | End: 2023-04-06
Attending: INTERNAL MEDICINE | Admitting: INTERNAL MEDICINE
Payer: COMMERCIAL

## 2023-04-06 DIAGNOSIS — E04.1 THYROID NODULE: ICD-10-CM

## 2023-04-06 PROCEDURE — 76536 US EXAM OF HEAD AND NECK: CPT

## 2023-04-17 ENCOUNTER — TELEPHONE (OUTPATIENT)
Dept: ENDOCRINOLOGY | Facility: CLINIC | Age: 58
End: 2023-04-17
Payer: COMMERCIAL

## 2023-04-17 NOTE — TELEPHONE ENCOUNTER
US THYROID 4/6/2023 9:01 AM     CLINICAL HISTORY: Thyroid nodule  TECHNIQUE: Thyroid ultrasound.      COMPARISON: Cervical spine CT on 10/13/2022      FINDINGS:  RIGHT lobe: 5.7 x 2.0 x 2.1 cm. Heterogenous echotexture.  Isthmus: 9 mm.  LEFT lobe: 5.3 x 2.2 x 2.6 cm. Heterogenous echotexture.     Heterogeneous multinodular thyroid gland. The most significant  discrete nodules are described below.  NODULES:     Nodule 1: A 1.6 x 1.2 x 0.9 cm nodule in the mid right lobe   Composition: Solid or almost completely solid, 2 points   Echogenicity: Hyperechoic or isoechoic, 1 point   Shape: Not taller than wide, 0 points   Margin: Ill-defined, 0 points   Echogenic Foci: None, or large comet-tail artifacts, 0 points   Point Total: 3 points. TI-RADS 3. If 2.5 cm or larger, recommend FNA;  if 1.5 cm or larger, recommend follow up US at 1, 3, and 5 years.      Nodule 2: A 2.2 x 1.6 x 1.5 cm nodule in the mid-inferior right lobe  Composition: Solid or almost completely solid, 2 points   Echogenicity: Hypoechoic, 2 points   Shape: Wider-than-tall, 0 points   Margin: Ill-defined, 0 points   Echogenic Foci: None, or large comet-tail artifacts, 0 points   Point Total: 4 points. TI-RADS 4. If 1.5 cm or larger, recommend FNA;  if 1 cm or larger, follow up US (annually for 5 years).     Nodule 3: A 1.3 x 1.1 x 0.5 cm nodule in the mid left lobe.  Composition: Mixed cystic and solid, 1 point   Echogenicity: Hyperechoic or isoechoic, 1 point   Shape: Wider-than-tall, 0 points   Margin: Ill-defined, 0 points   Echogenic Foci: None, or large comet-tail artifacts, 0 points   Point Total: 2 points. TI-RADS 2. No FNA.     Nodule 4: A 3.4 x 1.8 x 1.8 cm nodule in the mid-inferior posterior  aspect of the left lobe, corresponding to the nodule seen on the  cervical spine CT.  Composition: Solid or almost completely solid, 2 points   Echogenicity: Hypoechoic, 2 points   Shape: Wider-than-tall, 0 points   Margin: Ill-defined, 0 points    Echogenic Foci: None, or large comet-tail artifacts, 0 points   Point Total: 4 points. TI-RADS 4. If 1.5 cm or larger, recommend FNA;  if 1 cm or larger, follow up US (annually for 5 years).     Nodule 5: A 2.2 x 1.7 x 1.0 cm nodule in the isthmus  Composition: Solid or almost completely solid, 2 points   Echogenicity: Hyperechoic or isoechoic, 1 point   Shape: Wider-than-tall, 0 points   Margin: Smooth, 0 points   Echogenic Foci: None, or large comet-tail artifacts, 0 points   Point Total: 3 points. TI-RADS 3. If 2.5 cm or larger, recommend FNA;  if 1.5 cm or larger, recommend follow up US at 1, 3, and 5 years.                                                                      IMPRESSION:  1.  Multinodular heterogeneous thyroid gland.  2.  Two TI-RADS-4 nodules, measuring 3.4 cm in the mid-inferior left  lobe and 2.2 cm in the mid-inferior right lobe meet criteria for FNA.  Other TI-RADS-3 nodules should be followed with ultrasound.       Multiple thyroid nodules are seen.  Recommend follow up visit to discuss next steps.  Please help scheduled 12:30 on 4/18/2023 for video visit.

## 2023-04-17 NOTE — LETTER
April 25, 2023      Eliseo Oneal  [address] [facility] [address]        Dear Eliseo,     We tried reaching you by phone.  Your test showed Multiple thyroid nodules are seen.  Recommend follow up visit to discuss next steps.      Sincerely,        Kimberlyn Brooks MD

## 2023-04-18 NOTE — TELEPHONE ENCOUNTER
LM for pt to c/b to offer appt today at 1230pm.     Sent [employer] message as well.     If pt calls back please offer appointment.

## 2023-04-18 NOTE — TELEPHONE ENCOUNTER
Pt did not call back or respond to [employer].     Please advise if there is another date and time to add pt on

## 2023-04-27 NOTE — TELEPHONE ENCOUNTER
Patient returning call after receiving letter, please advise if patient is to be seen sooner. Made appt for next available and added to wait list

## 2023-04-27 NOTE — TELEPHONE ENCOUNTER
OK to book in next available open HOLLIS slot. (1/2)  Please note that there are 2 HOLLIS slots/day.  Please make sure that 1 HOLLIS slot is available/day for urgent needs.  Let me know if you have any questions.

## 2023-05-01 ENCOUNTER — VIRTUAL VISIT (OUTPATIENT)
Dept: ENDOCRINOLOGY | Facility: CLINIC | Age: 58
End: 2023-05-01
Payer: COMMERCIAL

## 2023-05-01 ENCOUNTER — MYC MEDICAL ADVICE (OUTPATIENT)
Dept: ENDOCRINOLOGY | Facility: CLINIC | Age: 58
End: 2023-05-01

## 2023-05-01 ENCOUNTER — TELEPHONE (OUTPATIENT)
Dept: ENDOCRINOLOGY | Facility: CLINIC | Age: 58
End: 2023-05-01

## 2023-05-01 DIAGNOSIS — E04.2 NON-TOXIC MULTINODULAR GOITER: Primary | ICD-10-CM

## 2023-05-01 DIAGNOSIS — E04.1 THYROID NODULE: ICD-10-CM

## 2023-05-01 PROCEDURE — 99214 OFFICE O/P EST MOD 30 MIN: CPT | Mod: VID | Performed by: INTERNAL MEDICINE

## 2023-05-01 NOTE — TELEPHONE ENCOUNTER
Called pt and advised him Dr. Brooks had a cancellation and would be able to add him on today at 3pm for video visit. Pt is able to make appointment. Pt scheduled 5/1 at 3pm. Gretchen Daley CMA on 5/1/2023 at 8:58 AM

## 2023-05-01 NOTE — PATIENT INSTRUCTIONS
-[facility]  Dr Brooks, Endocrinology Department    [facility] - [place] [address] [facility]  [place] [address]  Appointment [phone]  Fax: [phone]  [place]: Monday - Thursday      FNA of nodule #2 and nodule #4 with Radiology.  Follow up after that.     [facility] radiology scheduleing   [place]  [phone]   [facility] Radiology scheduling  [place]  [phone]     Please call and schedule the recommended test as discussed in clinic visit. These are the numbers to call.

## 2023-05-01 NOTE — LETTER
"    5/1/2023         RE: Eliseo Oneal  [id number] [facility] [address]        Dear Colleague,    Thank you for referring your patient, Eliseo Oneal, to the [facility]. Please see a copy of my visit note below.    THIS IS A VIDEO VISIT:    Phone call visit/virtual visit encounter:    Name of patient: Eliseo Oneal    Date of encounter: 5/1/2023    Time of start of video visit: 3:00    Video started: 3:06    Video ended: 3:16    Provider location: working from home/ [facility]    Patient location: patients home.    Mode of transmission: [employer] video/ [employer]    Verbal consent: obtained before starting visit. Pt is agreeable.      The patient has been notified of following:      \"This VIDEO visit will be conducted via a call between you and your physician/provider. We have found that certain health care needs can be provided without the need for a physical exam.  This service lets us provide the care you need with a short phone conversation.  If a prescription is necessary we can send it directly to your pharmacy.  If lab work is needed we can place an order for that and you can then stop by our lab to have the test done at a later time.     With new updates with corona virus patient might be billed as clinic visit.     If during the course of the call the physician/provider feels a telephone visit is not appropriate, you will not be charged for this service.\"      Past medical history, social history, family history, allergy and medications were reviewed and updated as appropriate.  Reviewed pertinent labs, notes, imaging studies personally.      Name: Eliseo Oneal  Seen for f/u of thyroid nodule.   HPI:  Eliseo Oneal is a 58 year old male who presents for the evaluation of thyroid nodule.    CT neck 10/2022 showed Incidental left thyroid nodule measuring over 2 cm.  Thyroid US: Multinodular heterogeneous thyroid gland. 5 thyroid nodules " are seen.    No FH of thyroid cancer  No history of radiation  No compressive s/s  Thyroid labs in acceptable range.  He is not on thyroid hormone replacement.  No other major risk factors.    Palpitations:  No  Changes to hair or skin: No  Diarrhea/Constipation:No  Changes in vision:No  Dysphagia or Shortness of breath:No  Tremors:No    PMH/PSH:  Past Medical History:   Diagnosis Date     No active medical problems      Past Surgical History:   Procedure Laterality Date     NO HISTORY OF SURGERY       Family Hx:  Family History   Problem Relation Age of Onset     Diabetes Father 58        Type II diabetes     Cerebrovascular Disease Father      Diabetes Paternal Grandmother      Colon Cancer No family hx of      Prostate Cancer No family hx of      Coronary Artery Disease No family hx of      Hypertension No family hx of      Hyperlipidemia No family hx of      Breast Cancer No family hx of      Thyroid Disease No family hx of      Thyroid disease: No           Social Hx:  Social History     Socioeconomic History     Marital status:      Spouse name: Not on file     Number of children: Not on file     Years of education: Not on file     Highest education level: Not on file   Occupational History     Not on file   Tobacco Use     Smoking status: Never     Smokeless tobacco: Never   Vaping Use     Vaping status: Never Used   Substance and Sexual Activity     Alcohol use: Never     Drug use: Never     Sexual activity: Yes     Partners: Female   Other Topics Concern     Parent/sibling w/ CABG, MI or angioplasty before 65F 55M? No   Social History Narrative     Not on file     Social Determinants of Health     Financial Resource Strain: Not on file   Food Insecurity: Not on file   Transportation Needs: Not on file   Physical Activity: Not on file   Stress: Not on file   Social Connections: Not on file   Intimate Partner Violence: Not on file   Housing Stability: Not on file          MEDICATIONS:  has a current  medication list which includes the following prescription(s): acetaminophen, amlodipine, lisinopril, and tizanidine.    Review of Systems  10 point ROS neg other than the symptoms noted above in the HPI.    Physical Exam   VS: There were no vitals taken for this visit.  GENERAL: healthy, alert and no distress  EYES: Eyes grossly normal to inspection, conjunctivae and sclerae normal  ENT: no nose swelling, nasal discharge.  Thyroid: no apparent thyroid nodules  RESP: no audible wheeze, cough, or visible cyanosis.  No visible retractions or increased work of breathing.  Able to speak fully in complete sentences.  ABDO: not evaluated.  EXTREMITIES: no hand tremors.  NEURO: Cranial nerves grossly intact, mentation intact and speech normal  SKIN: No apparent skin lesions, rash or edema seen   PSYCH: mentation appears normal, affect normal/bright, judgement and insight intact, normal speech and appearance well-groomed    LABS:  TFTs:  ENDO THYROID LABS-[facility] Latest Ref Rng & Units 11/23/2022   THYROID STIMULATING HORMONE (R) 0.30 - 4.20 uIU/mL 0.88     CT neck:  Incidental left thyroid nodule measuring over 2 cm for which  ultrasound correlation would be typically recommended if not  previously performed.    Thyroid Ultrasound 4/2023:  FINDINGS:  RIGHT lobe: 5.7 x 2.0 x 2.1 cm. Heterogenous echotexture.  Isthmus: 9 mm.  LEFT lobe: 5.3 x 2.2 x 2.6 cm. Heterogenous echotexture.     Heterogeneous multinodular thyroid gland. The most significant  discrete nodules are described below.  NODULES:     Nodule 1: A 1.6 x 1.2 x 0.9 cm nodule in the mid right lobe   Composition: Solid or almost completely solid, 2 points   Echogenicity: Hyperechoic or isoechoic, 1 point   Shape: Not taller than wide, 0 points   Margin: Ill-defined, 0 points   Echogenic Foci: None, or large comet-tail artifacts, 0 points   Point Total: 3 points. TI-RADS 3. If 2.5 cm or larger, recommend FNA;  if 1.5 cm or larger, recommend follow up US at 1, 3, and 5  years.      Nodule 2: A 2.2 x 1.6 x 1.5 cm nodule in the mid-inferior right lobe  Composition: Solid or almost completely solid, 2 points   Echogenicity: Hypoechoic, 2 points   Shape: Wider-than-tall, 0 points   Margin: Ill-defined, 0 points   Echogenic Foci: None, or large comet-tail artifacts, 0 points   Point Total: 4 points. TI-RADS 4. If 1.5 cm or larger, recommend FNA;  if 1 cm or larger, follow up US (annually for 5 years).     Nodule 3: A 1.3 x 1.1 x 0.5 cm nodule in the mid left lobe.  Composition: Mixed cystic and solid, 1 point   Echogenicity: Hyperechoic or isoechoic, 1 point   Shape: Wider-than-tall, 0 points   Margin: Ill-defined, 0 points   Echogenic Foci: None, or large comet-tail artifacts, 0 points   Point Total: 2 points. TI-RADS 2. No FNA.     Nodule 4: A 3.4 x 1.8 x 1.8 cm nodule in the mid-inferior posterior  aspect of the left lobe, corresponding to the nodule seen on the  cervical spine CT.  Composition: Solid or almost completely solid, 2 points   Echogenicity: Hypoechoic, 2 points   Shape: Wider-than-tall, 0 points   Margin: Ill-defined, 0 points   Echogenic Foci: None, or large comet-tail artifacts, 0 points   Point Total: 4 points. TI-RADS 4. If 1.5 cm or larger, recommend FNA;  if 1 cm or larger, follow up US (annually for 5 years).     Nodule 5: A 2.2 x 1.7 x 1.0 cm nodule in the isthmus  Composition: Solid or almost completely solid, 2 points   Echogenicity: Hyperechoic or isoechoic, 1 point   Shape: Wider-than-tall, 0 points   Margin: Smooth, 0 points   Echogenic Foci: None, or large comet-tail artifacts, 0 points   Point Total: 3 points. TI-RADS 3. If 2.5 cm or larger, recommend FNA;  if 1.5 cm or larger, recommend follow up US at 1, 3, and 5 years.                                                                      IMPRESSION:  1.  Multinodular heterogeneous thyroid gland.  2.  Two TI-RADS-4 nodules, measuring 3.4 cm in the mid-inferior left  lobe and 2.2 cm in the mid-inferior  right lobe meet criteria for FNA.  Other TI-RADS-3 nodules should be followed with ultrasound.  All pertinent notes, labs, and images personally reviewed by me.     A/P  Mr.David NATALIA Oneal is a 58 year old here for the evaluation of thyroid nodule:  #1 Thyroid Nodule:  Multiple thyroid nodules are seen on US.  No FH of thyroid cancer  No history of radiation  No compressive s/s  Thyroid labs in acceptable range.  He is not on thyroid hormone replacement.  No other major risk factors.  Plan:  Discussed diagnosis, pathophysiology, management and treatment options of condition with pt.  FNA of nodule #2 and nodule #4 with Radiology.  Follow up after that.  Discussed possible outcomes of biopsy including possible benign, possible malignancy and possible AUS. If AUS indication for molecular marker testing.  Discussed possible compressive symptoms and signs to watch for.    Thyroid nodules are common and are frequently benign. Data suggest that the prevalence of palpable thyroid nodules is 3% to 7% in [place]; the prevalence is as high as 50% based on ultrasonography (US) or autopsy data. All patients with a palpable thyroid nodule, however, should undergo US examination. US-guided FNA (US-FNA) is recommended for nodules ?10 mm; US-FNA is suggested for nodules <10 mm only if clinical information or US features are suspicious.  The frequency of thyroid nodules in general population was discussed. Also discussed possibility of malignancy, potential for thyroid autonomy.       Causes of thyroid Nodules: Benign (Multinodular goiter, Hashimoto s thyroiditis, Simple or hemorrhagic cysts, Follicular adenomas, Subacute thyroiditis) or Malignant(Papillary carcinoma, Follicular carcinoma, Hürthle cell carcinoma, Medullary carcinoma, Anaplastic carcinoma, Primary thyroid lymphoma, or Metastatic malignant lesion).    MultiNodule:  The risk of cancer is not significantly higher in palpable solitary thyroid nodules than in  multinodular lesions or in nodules in diffuse goiters. In multinodular thyroid glands, the cytologic sampling should be focused on lesions characterized by suspicious US features rather than on larger or clinically dominant nodules.    Cyst:  Most complex thyroid nodules with a dominant fluid component are benign. USFNA, however, should always be performed because the rare papillary thyroid carcinoma (PTC) can be cystic. An unsatisfactory (nondiagnostic) specimen usually results from a cystic nodule that yields few or no follicular cells. Reaspiration yields satisfactory results in 50% of cases.    All questions were answered.  The patient indicates understanding of the above issues and agrees with the plan set forth.    Follow-up:  After FNA.    Kimberlyn Brooks MD  Endocrinology   [facility]    Cc: Franco Almazan    Addendum to above note and clinic visit:    Labs reviewed.    See result note/telephone encounter.      Answers for HPI/ROS submitted by the patient on 5/1/2023  General Symptoms: No  Skin Symptoms: No  HENT Symptoms: No  EYE SYMPTOMS: No  HEART SYMPTOMS: No  LUNG SYMPTOMS: No  INTESTINAL SYMPTOMS: No  URINARY SYMPTOMS: No  REPRODUCTIVE SYMPTOMS: No  SKELETAL SYMPTOMS: No  BLOOD SYMPTOMS: No  NERVOUS SYSTEM SYMPTOMS: No  MENTAL HEALTH SYMPTOMS: No          Again, thank you for allowing me to participate in the care of your patient.        Sincerely,        Kimberlyn Brooks MD

## 2023-05-01 NOTE — NURSING NOTE
Is the patient currently in the state of [place]? YES    Visit mode:VIDEO    If the visit is dropped, the patient can be reconnected by: TELEPHONE VISIT: Phone number: [phone]    Will anyone else be joining the visit? NO    How would you like to obtain your AVS? MyChart    Are changes needed to the allergy or medication list? NO    Reason for visit:   Chief Complaint   Patient presents with     Video Visit     Thyroid Nodule       Jenn Gray MA

## 2023-05-01 NOTE — PROGRESS NOTES
"THIS IS A VIDEO VISIT:    Phone call visit/virtual visit encounter:    Name of patient: Eliseo Oneal    Date of encounter: 5/1/2023    Time of start of video visit: 3:00    Video started: 3:06    Video ended: 3:16    Provider location: working from home/ [facility]    Patient location: patients home.    Mode of transmission: [employer] video/ [employer]    Verbal consent: obtained before starting visit. Pt is agreeable.      The patient has been notified of following:      \"This VIDEO visit will be conducted via a call between you and your physician/provider. We have found that certain health care needs can be provided without the need for a physical exam.  This service lets us provide the care you need with a short phone conversation.  If a prescription is necessary we can send it directly to your pharmacy.  If lab work is needed we can place an order for that and you can then stop by our lab to have the test done at a later time.     With new updates with corona virus patient might be billed as clinic visit.     If during the course of the call the physician/provider feels a telephone visit is not appropriate, you will not be charged for this service.\"      Past medical history, social history, family history, allergy and medications were reviewed and updated as appropriate.  Reviewed pertinent labs, notes, imaging studies personally.      Name: Eliseo Oneal  Seen for f/u of thyroid nodule.   HPI:  Eliseo Oneal is a 58 year old male who presents for the evaluation of thyroid nodule.    CT neck 10/2022 showed Incidental left thyroid nodule measuring over 2 cm.  Thyroid US: Multinodular heterogeneous thyroid gland. 5 thyroid nodules are seen.    No FH of thyroid cancer  No history of radiation  No compressive s/s  Thyroid labs in acceptable range.  He is not on thyroid hormone replacement.  No other major risk factors.    Palpitations:  No  Changes to hair or skin: " No  Diarrhea/Constipation:No  Changes in vision:No  Dysphagia or Shortness of breath:No  Tremors:No    PMH/PSH:  Past Medical History:   Diagnosis Date     No active medical problems      Past Surgical History:   Procedure Laterality Date     NO HISTORY OF SURGERY       Family Hx:  Family History   Problem Relation Age of Onset     Diabetes Father 58        Type II diabetes     Cerebrovascular Disease Father      Diabetes Paternal Grandmother      Colon Cancer No family hx of      Prostate Cancer No family hx of      Coronary Artery Disease No family hx of      Hypertension No family hx of      Hyperlipidemia No family hx of      Breast Cancer No family hx of      Thyroid Disease No family hx of      Thyroid disease: No           Social Hx:  Social History     Socioeconomic History     Marital status:      Spouse name: Not on file     Number of children: Not on file     Years of education: Not on file     Highest education level: Not on file   Occupational History     Not on file   Tobacco Use     Smoking status: Never     Smokeless tobacco: Never   Vaping Use     Vaping status: Never Used   Substance and Sexual Activity     Alcohol use: Never     Drug use: Never     Sexual activity: Yes     Partners: Female   Other Topics Concern     Parent/sibling w/ CABG, MI or angioplasty before 65F 55M? No   Social History Narrative     Not on file     Social Determinants of Health     Financial Resource Strain: Not on file   Food Insecurity: Not on file   Transportation Needs: Not on file   Physical Activity: Not on file   Stress: Not on file   Social Connections: Not on file   Intimate Partner Violence: Not on file   Housing Stability: Not on file          MEDICATIONS:  has a current medication list which includes the following prescription(s): acetaminophen, amlodipine, lisinopril, and tizanidine.    Review of Systems  10 point ROS neg other than the symptoms noted above in the HPI.    Physical Exam   VS: There were  no vitals taken for this visit.  GENERAL: healthy, alert and no distress  EYES: Eyes grossly normal to inspection, conjunctivae and sclerae normal  ENT: no nose swelling, nasal discharge.  Thyroid: no apparent thyroid nodules  RESP: no audible wheeze, cough, or visible cyanosis.  No visible retractions or increased work of breathing.  Able to speak fully in complete sentences.  ABDO: not evaluated.  EXTREMITIES: no hand tremors.  NEURO: Cranial nerves grossly intact, mentation intact and speech normal  SKIN: No apparent skin lesions, rash or edema seen   PSYCH: mentation appears normal, affect normal/bright, judgement and insight intact, normal speech and appearance well-groomed    LABS:  TFTs:  END[facility] THYROID LABS[facility] Latest Ref Rng & Units 11/23/2022   THYROID STIMULATING HORMONE (R) 0.30 - 4.20 uIU/mL 0.88     CT neck:  Incidental left thyroid nodule measuring over 2 cm for which  ultrasound correlation would be typically recommended if not  previously performed.    Thyroid Ultrasound 4/2023:  FINDINGS:  RIGHT lobe: 5.7 x 2.0 x 2.1 cm. Heterogenous echotexture.  Isthmus: 9 mm.  LEFT lobe: 5.3 x 2.2 x 2.6 cm. Heterogenous echotexture.     Heterogeneous multinodular thyroid gland. The most significant  discrete nodules are described below.  NODULES:     Nodule 1: A 1.6 x 1.2 x 0.9 cm nodule in the mid right lobe   Composition: Solid or almost completely solid, 2 points   Echogenicity: Hyperechoic or isoechoic, 1 point   Shape: Not taller than wide, 0 points   Margin: Ill-defined, 0 points   Echogenic Foci: None, or large comet-tail artifacts, 0 points   Point Total: 3 points. TI-RADS 3. If 2.5 cm or larger, recommend FNA;  if 1.5 cm or larger, recommend follow up US at 1, 3, and 5 years.      Nodule 2: A 2.2 x 1.6 x 1.5 cm nodule in the mid-inferior right lobe  Composition: Solid or almost completely solid, 2 points   Echogenicity: Hypoechoic, 2 points   Shape: Wider-than-tall, 0 points   Margin: Ill-defined, 0  points   Echogenic Foci: None, or large comet-tail artifacts, 0 points   Point Total: 4 points. TI-RADS 4. If 1.5 cm or larger, recommend FNA;  if 1 cm or larger, follow up US (annually for 5 years).     Nodule 3: A 1.3 x 1.1 x 0.5 cm nodule in the mid left lobe.  Composition: Mixed cystic and solid, 1 point   Echogenicity: Hyperechoic or isoechoic, 1 point   Shape: Wider-than-tall, 0 points   Margin: Ill-defined, 0 points   Echogenic Foci: None, or large comet-tail artifacts, 0 points   Point Total: 2 points. TI-RADS 2. No FNA.     Nodule 4: A 3.4 x 1.8 x 1.8 cm nodule in the mid-inferior posterior  aspect of the left lobe, corresponding to the nodule seen on the  cervical spine CT.  Composition: Solid or almost completely solid, 2 points   Echogenicity: Hypoechoic, 2 points   Shape: Wider-than-tall, 0 points   Margin: Ill-defined, 0 points   Echogenic Foci: None, or large comet-tail artifacts, 0 points   Point Total: 4 points. TI-RADS 4. If 1.5 cm or larger, recommend FNA;  if 1 cm or larger, follow up US (annually for 5 years).     Nodule 5: A 2.2 x 1.7 x 1.0 cm nodule in the isthmus  Composition: Solid or almost completely solid, 2 points   Echogenicity: Hyperechoic or isoechoic, 1 point   Shape: Wider-than-tall, 0 points   Margin: Smooth, 0 points   Echogenic Foci: None, or large comet-tail artifacts, 0 points   Point Total: 3 points. TI-RADS 3. If 2.5 cm or larger, recommend FNA;  if 1.5 cm or larger, recommend follow up US at 1, 3, and 5 years.                                                                      IMPRESSION:  1.  Multinodular heterogeneous thyroid gland.  2.  Two TI-RADS-4 nodules, measuring 3.4 cm in the mid-inferior left  lobe and 2.2 cm in the mid-inferior right lobe meet criteria for FNA.  Other TI-RADS-3 nodules should be followed with ultrasound.  All pertinent notes, labs, and images personally reviewed by me.     A/P  .Eliseo FISHER Narayanbryan is a 58 year old here for the evaluation of  thyroid nodule:  #1 Thyroid Nodule:  Multiple thyroid nodules are seen on US.  No FH of thyroid cancer  No history of radiation  No compressive s/s  Thyroid labs in acceptable range.  He is not on thyroid hormone replacement.  No other major risk factors.  Plan:  Discussed diagnosis, pathophysiology, management and treatment options of condition with pt.  FNA of nodule #2 and nodule #4 with Radiology.  Follow up after that.  Discussed possible outcomes of biopsy including possible benign, possible malignancy and possible AUS. If AUS indication for molecular marker testing.  Discussed possible compressive symptoms and signs to watch for.    Thyroid nodules are common and are frequently benign. Data suggest that the prevalence of palpable thyroid nodules is 3% to 7% in [place]; the prevalence is as high as 50% based on ultrasonography (US) or autopsy data. All patients with a palpable thyroid nodule, however, should undergo US examination. US-guided FNA (US-FNA) is recommended for nodules ?10 mm; US-FNA is suggested for nodules <10 mm only if clinical information or US features are suspicious.  The frequency of thyroid nodules in general population was discussed. Also discussed possibility of malignancy, potential for thyroid autonomy.       Causes of thyroid Nodules: Benign (Multinodular goiter, Hashimoto s thyroiditis, Simple or hemorrhagic cysts, Follicular adenomas, Subacute thyroiditis) or Malignant(Papillary carcinoma, Follicular carcinoma, Hürthle cell carcinoma, Medullary carcinoma, Anaplastic carcinoma, Primary thyroid lymphoma, or Metastatic malignant lesion).    MultiNodule:  The risk of cancer is not significantly higher in palpable solitary thyroid nodules than in multinodular lesions or in nodules in diffuse goiters. In multinodular thyroid glands, the cytologic sampling should be focused on lesions characterized by suspicious US features rather than on larger or clinically dominant  nodules.    Cyst:  Most complex thyroid nodules with a dominant fluid component are benign. USFNA, however, should always be performed because the rare papillary thyroid carcinoma (PTC) can be cystic. An unsatisfactory (nondiagnostic) specimen usually results from a cystic nodule that yields few or no follicular cells. Reaspiration yields satisfactory results in 50% of cases.    All questions were answered.  The patient indicates understanding of the above issues and agrees with the plan set forth.    Follow-up:  After FNA.    Kimberlyn Brooks MD  Endocrinology   [facility]    Cc: Franco Almazan    Addendum to above note and clinic visit:    Labs reviewed.    See result note/telephone encounter.      Answers for HPI/ROS submitted by the patient on 5/1/2023  General Symptoms: No  Skin Symptoms: No  HENT Symptoms: No  EYE SYMPTOMS: No  HEART SYMPTOMS: No  LUNG SYMPTOMS: No  INTESTINAL SYMPTOMS: No  URINARY SYMPTOMS: No  REPRODUCTIVE SYMPTOMS: No  SKELETAL SYMPTOMS: No  BLOOD SYMPTOMS: No  NERVOUS SYSTEM SYMPTOMS: No  MENTAL HEALTH SYMPTOMS: No

## 2023-05-09 ENCOUNTER — TELEPHONE (OUTPATIENT)
Dept: FAMILY MEDICINE | Facility: CLINIC | Age: 58
End: 2023-05-09
Payer: COMMERCIAL

## 2023-05-09 NOTE — LETTER
[facility]   [address] [place]  [address]               [phone]    After a review of your chart, we noticed that you are due for your colon cancer screening. We are concerned about this because we want to be sure that we are providing the most comprehensive and best quality care possible for you and your family.         Colon cancer is now the second leading cause of cancer death in the [place] for both men and women.  You may or may not know that deaths from colon cancer can be prevented by 90-95% just by screening for colon cancer. Screening is now recommended to start at age 45.        We recommend one of the following options to best screen for colon cancer:      Colonoscopy- this is the best way to detect and treat many types of colon cancer. It is typically recommended every 10 years although if you have abnormal findings, they may recommend it more frequently. We can actually remove any problem lesions before they ever become cancerous.  A colonoscopy not only looks for cancer but also allows us to get rid of any pre-cancerous lesions that might be found.     Cologuard- this is a stool test that is mailed to you. It is recommended every 3 years. Cologuard detects altered DNA and blood in your stool. It is able to detect 92% of colon cancers. You should know that if it returns positive, a colonoscopy would be recommended and would no longer be considered screening at that time. Check with your insurance regarding coverage for these tests.    FIT test- this is a stool test. It is recommended to complete this yearly. The FIT test uses antibodies to detect blood in your stool. You should know that if it returns positive, a colonoscopy would be recommended and would no longer be considered screening at that time. Check with your insurance regarding coverage for these tests.        If you've already had this test done somewhere else, please contact our office so that  we can update your records and prevent you from getting this correspondence in the future.      If you have not already had this test done, please contact us at [phone] so that we can review next steps and help get you set up for the colon cancer screening of your choice.           Thank you for allowing us to care for you.      [facility]

## 2023-05-09 NOTE — TELEPHONE ENCOUNTER
Summary:    Patient is due/failing the following:   COLONOSCOPY    Reviewed:    [] CARE EVERYWHERE  [] LAST OV NOTE   [] FYI TAB  [] MYCHART ACTIVE?  [] LAST PANEL ENCOUNTER  [] FUTURE APPTS  [] IMMUNIZATIONS  [] Media Tab            Action needed:   Patient needs referral/order: colonoscopy    Type of outreach:    Sent Virtual 3-D Display for Smartphoneshart message.                                                                               Sarah Matamoros/HERMINIA  McLeod---[facility]

## 2023-05-18 ENCOUNTER — HOSPITAL ENCOUNTER (OUTPATIENT)
Dept: ULTRASOUND IMAGING | Facility: CLINIC | Age: 58
Discharge: HOME | End: 2023-05-18
Attending: INTERNAL MEDICINE | Admitting: INTERNAL MEDICINE
Payer: COMMERCIAL

## 2023-05-18 DIAGNOSIS — E04.2 NON-TOXIC MULTINODULAR GOITER: ICD-10-CM

## 2023-05-18 DIAGNOSIS — E04.1 THYROID NODULE: Primary | ICD-10-CM

## 2023-05-18 PROCEDURE — 250N000009 HC RX 250: Performed by: RADIOLOGY

## 2023-05-18 PROCEDURE — 88173 CYTOPATH EVAL FNA REPORT: CPT | Mod: TC | Performed by: INTERNAL MEDICINE

## 2023-05-18 PROCEDURE — 272N000431 US BIOPSY THYROID FINE NEEDLE ASPIRATION

## 2023-05-18 RX ADMIN — LIDOCAINE HYDROCHLORIDE 5 ML: 10 INJECTION, SOLUTION EPIDURAL; INFILTRATION; INTRACAUDAL; PERINEURAL at 08:41

## 2023-05-18 NOTE — PROGRESS NOTES
Patient tolerated bilateral thyroid nodule biopsy well by Dr. Delaney.  Bandage clean, dry and intact at time of discharge.  Patient states understanding of discharge instructions and left department in satisfactory condition.

## 2023-05-19 LAB
PATH REPORT.COMMENTS IMP SPEC: NORMAL
PATH REPORT.FINAL DX SPEC: NORMAL
PATH REPORT.FINAL DX SPEC: NORMAL
PATH REPORT.GROSS SPEC: NORMAL
PATH REPORT.GROSS SPEC: NORMAL
PATH REPORT.MICROSCOPIC SPEC OTHER STN: NORMAL
PATH REPORT.MICROSCOPIC SPEC OTHER STN: NORMAL
PATH REPORT.RELEVANT HX SPEC: NORMAL
PATH REPORT.RELEVANT HX SPEC: NORMAL

## 2023-05-19 PROCEDURE — 88173 CYTOPATH EVAL FNA REPORT: CPT | Mod: 26 | Performed by: PATHOLOGY

## 2023-06-05 ENCOUNTER — VIRTUAL VISIT (OUTPATIENT)
Dept: ENDOCRINOLOGY | Facility: CLINIC | Age: 58
End: 2023-06-05
Payer: COMMERCIAL

## 2023-06-05 ENCOUNTER — TELEPHONE (OUTPATIENT)
Dept: ENDOCRINOLOGY | Facility: CLINIC | Age: 58
End: 2023-06-05

## 2023-06-05 DIAGNOSIS — E04.2 NON-TOXIC MULTINODULAR GOITER: ICD-10-CM

## 2023-06-05 DIAGNOSIS — E04.1 THYROID NODULE: Primary | ICD-10-CM

## 2023-06-05 PROCEDURE — 99213 OFFICE O/P EST LOW 20 MIN: CPT | Mod: VID | Performed by: INTERNAL MEDICINE

## 2023-06-05 NOTE — NURSING NOTE
Is the patient currently in the state of [place]? YES    Visit mode:VIDEO    If the visit is dropped, the patient can be reconnected by: VIDEO VISIT: Text to cell phone: [phone]    Will anyone else be joining the visit? NO      How would you like to obtain your AVS? MyChart    Are changes needed to the allergy or medication list? NO    Reason for visit: RECHECK (Follow up ultrasound )

## 2023-06-05 NOTE — PATIENT INSTRUCTIONS
-[facility]  Dr Brooks, Endocrinology Department    [facility]   [address] [facility]  [place] [address]  Appointment [phone]  Fax: [phone]  [place]: Monday - Thursday      Thyroid labs and thyroid ultrasound in 2024.  Please make a lab appointment for blood work and ultrasound appointment and follow up clinic appointment in 1 week after that to discuss results.     [facility] radiology scheduleing   [place]  [phone]   [facility] Radiology scheduling  [place]  [phone]     Please call and schedule the recommended test as discussed in clinic visit. These are the numbers to call.

## 2023-06-05 NOTE — PROGRESS NOTES
"THIS IS A VIDEO VISIT:    Phone call visit/virtual visit encounter:    Name of patient: Eliseo Oneal    Date of encounter: 6/5/2023    Time of start of video visit: 1:30    Video started: 1:39    Video ended: 1:44    Provider location: working from home/ [facility]    Patient location: patients home.    Mode of transmission: Futubra video/ [place]    Verbal consent: obtained before starting visit. Pt is agreeable.      The patient has been notified of following:      \"This VIDEO visit will be conducted via a call between you and your physician/provider. We have found that certain health care needs can be provided without the need for a physical exam.  This service lets us provide the care you need with a short phone conversation.  If a prescription is necessary we can send it directly to your pharmacy.  If lab work is needed we can place an order for that and you can then stop by our lab to have the test done at a later time.     With new updates with corona virus patient might be billed as clinic visit.     If during the course of the call the physician/provider feels a telephone visit is not appropriate, you will not be charged for this service.\"      Past medical history, social history, family history, allergy and medications were reviewed and updated as appropriate.  Reviewed pertinent labs, notes, imaging studies personally.      Name: Eliseo Oneal  Seen for f/u of thyroid nodule.   HPI:  Eliseo Oneal is a 58 year old male who presents for the evaluation of thyroid nodule.    CT neck 10/2022 showed Incidental left thyroid nodule measuring over 2 cm.  Thyroid US 4/2023: Multinodular heterogeneous thyroid gland. 5 thyroid nodules are seen.  5/2023-- s/p FNA of nodule #2 and nodule #4 --> BOTH c/w benign findings.    No FH of thyroid cancer  No history of radiation  No compressive s/s  Thyroid labs in acceptable range.  He is not on thyroid hormone replacement.  No other major risk " factors.    Palpitations:  No  Changes to hair or skin: No  Diarrhea/Constipation:No  Changes in vision:No  Dysphagia or Shortness of breath:No  Tremors:No    PMH/PSH:  Past Medical History:   Diagnosis Date     No active medical problems      Past Surgical History:   Procedure Laterality Date     NO HISTORY OF SURGERY       Family Hx:  Family History   Problem Relation Age of Onset     Diabetes Father 58        Type II diabetes     Cerebrovascular Disease Father      Diabetes Paternal Grandmother      Colon Cancer No family hx of      Prostate Cancer No family hx of      Coronary Artery Disease No family hx of      Hypertension No family hx of      Hyperlipidemia No family hx of      Breast Cancer No family hx of      Thyroid Disease No family hx of      Thyroid disease: No           Social Hx:  Social History     Socioeconomic History     Marital status:      Spouse name: Not on file     Number of children: Not on file     Years of education: Not on file     Highest education level: Not on file   Occupational History     Not on file   Tobacco Use     Smoking status: Never     Smokeless tobacco: Never   Vaping Use     Vaping status: Never Used   Substance and Sexual Activity     Alcohol use: Never     Drug use: Never     Sexual activity: Yes     Partners: Female   Other Topics Concern     Parent/sibling w/ CABG, MI or angioplasty before 65F 55M? No   Social History Narrative     Not on file     Social Determinants of Health     Financial Resource Strain: Not on file   Food Insecurity: Not on file   Transportation Needs: Not on file   Physical Activity: Not on file   Stress: Not on file   Social Connections: Not on file   Intimate Partner Violence: Not on file   Housing Stability: Not on file          MEDICATIONS:  has a current medication list which includes the following prescription(s): acetaminophen, amlodipine, lisinopril, and tizanidine.    Review of Systems  10 point ROS neg other than the symptoms  noted above in the HPI.    Physical Exam   VS: There were no vitals taken for this visit.  GENERAL: healthy, alert and no distress  EYES: Eyes grossly normal to inspection, conjunctivae and sclerae normal  ENT: no nose swelling, nasal discharge.  Thyroid: no apparent thyroid nodules  RESP: no audible wheeze, cough, or visible cyanosis.  No visible retractions or increased work of breathing.  Able to speak fully in complete sentences.  ABDO: not evaluated.  EXTREMITIES: no hand tremors.  NEURO: Cranial nerves grossly intact, mentation intact and speech normal  SKIN: No apparent skin lesions, rash or edema seen   PSYCH: mentation appears normal, affect normal/bright, judgement and insight intact, normal speech and appearance well-groomed    LABS:  TFTs:  ENDO THYROID LABS-P Latest Ref Rng & Units 11/23/2022   THYROID STIMULATING HORMONE (R) 0.30 - 4.20 uIU/mL 0.88     CT neck:  Incidental left thyroid nodule measuring over 2 cm for which  ultrasound correlation would be typically recommended if not  previously performed.    Thyroid Ultrasound 4/2023:  FINDINGS:  RIGHT lobe: 5.7 x 2.0 x 2.1 cm. Heterogenous echotexture.  Isthmus: 9 mm.  LEFT lobe: 5.3 x 2.2 x 2.6 cm. Heterogenous echotexture.     Heterogeneous multinodular thyroid gland. The most significant  discrete nodules are described below.  NODULES:     Nodule 1: A 1.6 x 1.2 x 0.9 cm nodule in the mid right lobe   Composition: Solid or almost completely solid, 2 points   Echogenicity: Hyperechoic or isoechoic, 1 point   Shape: Not taller than wide, 0 points   Margin: Ill-defined, 0 points   Echogenic Foci: None, or large comet-tail artifacts, 0 points   Point Total: 3 points. TI-RADS 3. If 2.5 cm or larger, recommend FNA;  if 1.5 cm or larger, recommend follow up US at 1, 3, and 5 years.      Nodule 2: A 2.2 x 1.6 x 1.5 cm nodule in the mid-inferior right lobe  Composition: Solid or almost completely solid, 2 points   Echogenicity: Hypoechoic, 2 points    Shape: Wider-than-tall, 0 points   Margin: Ill-defined, 0 points   Echogenic Foci: None, or large comet-tail artifacts, 0 points   Point Total: 4 points. TI-RADS 4. If 1.5 cm or larger, recommend FNA;  if 1 cm or larger, follow up US (annually for 5 years).     Nodule 3: A 1.3 x 1.1 x 0.5 cm nodule in the mid left lobe.  Composition: Mixed cystic and solid, 1 point   Echogenicity: Hyperechoic or isoechoic, 1 point   Shape: Wider-than-tall, 0 points   Margin: Ill-defined, 0 points   Echogenic Foci: None, or large comet-tail artifacts, 0 points   Point Total: 2 points. TI-RADS 2. No FNA.     Nodule 4: A 3.4 x 1.8 x 1.8 cm nodule in the mid-inferior posterior  aspect of the left lobe, corresponding to the nodule seen on the  cervical spine CT.  Composition: Solid or almost completely solid, 2 points   Echogenicity: Hypoechoic, 2 points   Shape: Wider-than-tall, 0 points   Margin: Ill-defined, 0 points   Echogenic Foci: None, or large comet-tail artifacts, 0 points   Point Total: 4 points. TI-RADS 4. If 1.5 cm or larger, recommend FNA;  if 1 cm or larger, follow up US (annually for 5 years).     Nodule 5: A 2.2 x 1.7 x 1.0 cm nodule in the isthmus  Composition: Solid or almost completely solid, 2 points   Echogenicity: Hyperechoic or isoechoic, 1 point   Shape: Wider-than-tall, 0 points   Margin: Smooth, 0 points   Echogenic Foci: None, or large comet-tail artifacts, 0 points   Point Total: 3 points. TI-RADS 3. If 2.5 cm or larger, recommend FNA;  if 1.5 cm or larger, recommend follow up US at 1, 3, and 5 years.                                                                      IMPRESSION:  1.  Multinodular heterogeneous thyroid gland.  2.  Two TI-RADS-4 nodules, measuring 3.4 cm in the mid-inferior left  lobe and 2.2 cm in the mid-inferior right lobe meet criteria for FNA.  Other TI-RADS-3 nodules should be followed with ultrasound.        All pertinent notes, labs, and images personally reviewed by me.      A/P  Mr.David NATALIA Oneal is a 58 year old here for the evaluation of thyroid nodule:  #1 Thyroid Nodule:  Multiple thyroid nodules are seen on US.  5/2023--s/p FNA of nodule #2 and nodule #4--> both c/w benign findings.  No FH of thyroid cancer  No history of radiation  No compressive s/s  Thyroid labs in acceptable range.  He is not on thyroid hormone replacement.  No other major risk factors.  Plan:  Discussed diagnosis, pathophysiology, management and treatment options of condition with pt.  Plan to continue to monitor.  Discussed biopsy results and benign findings.  Thyroid labs and thyroid ultrasound in 4/2024.  Please make a lab appointment for blood work and ultrasound appointment and follow up clinic appointment in 1 week after that to discuss results.  Discussed compressive s/s to monitor.    Causes of thyroid Nodules: Benign (Multinodular goiter, Hashimoto s thyroiditis, Simple or hemorrhagic cysts, Follicular adenomas, Subacute thyroiditis) or Malignant(Papillary carcinoma, Follicular carcinoma, Hürthle cell carcinoma, Medullary carcinoma, Anaplastic carcinoma, Primary thyroid lymphoma, or Metastatic malignant lesion).    MultiNodule:  The risk of cancer is not significantly higher in palpable solitary thyroid nodules than in multinodular lesions or in nodules in diffuse goiters. In multinodular thyroid glands, the cytologic sampling should be focused on lesions characterized by suspicious US features rather than on larger or clinically dominant nodules.    Cyst:  Most complex thyroid nodules with a dominant fluid component are benign. USFNA, however, should always be performed because the rare papillary thyroid carcinoma (PTC) can be cystic. An unsatisfactory (nondiagnostic) specimen usually results from a cystic nodule that yields few or no follicular cells. Reaspiration yields satisfactory results in 50% of cases.    All questions were answered.  The patient indicates understanding of the above  issues and agrees with the plan set forth.    Follow-up:  As noted in AVS    Kimberlyn Brooks MD  Endocrinology   [facility]    Cc: Franco Almazan    Addendum to above note and clinic visit:    Labs reviewed.    See result note/telephone encounter.      Answers for HPI/ROS submitted by the patient on 6/4/2023  General Symptoms: No  Skin Symptoms: No  HENT Symptoms: No  EYE SYMPTOMS: No  HEART SYMPTOMS: No  LUNG SYMPTOMS: No  INTESTINAL SYMPTOMS: No  URINARY SYMPTOMS: No  REPRODUCTIVE SYMPTOMS: No  SKELETAL SYMPTOMS: No  BLOOD SYMPTOMS: No  NERVOUS SYSTEM SYMPTOMS: No  MENTAL HEALTH SYMPTOMS: No

## 2023-06-05 NOTE — LETTER
"    6/5/2023         RE: Eliseo Oneal  [address] [facility] [address]        Dear Colleague,    Thank you for referring your patient, Eliseo Oneal, to the [facility]. Please see a copy of my visit note below.    THIS IS A VIDEO VISIT:    Phone call visit/virtual visit encounter:    Name of patient: Eliseo Oneal    Date of encounter: 6/5/2023    Time of start of video visit: 1:30    Video started: 1:39    Video ended: 1:44    Provider location: working from home/ [facility]    Patient location: patients home.    Mode of transmission: [employer] video/ [employer]    Verbal consent: obtained before starting visit. Pt is agreeable.      The patient has been notified of following:      \"This VIDEO visit will be conducted via a call between you and your physician/provider. We have found that certain health care needs can be provided without the need for a physical exam.  This service lets us provide the care you need with a short phone conversation.  If a prescription is necessary we can send it directly to your pharmacy.  If lab work is needed we can place an order for that and you can then stop by our lab to have the test done at a later time.     With new updates with corona virus patient might be billed as clinic visit.     If during the course of the call the physician/provider feels a telephone visit is not appropriate, you will not be charged for this service.\"      Past medical history, social history, family history, allergy and medications were reviewed and updated as appropriate.  Reviewed pertinent labs, notes, imaging studies personally.      Name: Eliseo Oneal  Seen for f/u of thyroid nodule.   HPI:  Eliseo Oneal is a 58 year old male who presents for the evaluation of thyroid nodule.    CT neck 10/2022 showed Incidental left thyroid nodule measuring over 2 cm.  Thyroid US 4/2023: Multinodular heterogeneous thyroid gland. 5 thyroid " nodules are seen.  5/2023-- s/p FNA of nodule #2 and nodule #4 --> BOTH c/w benign findings.    No FH of thyroid cancer  No history of radiation  No compressive s/s  Thyroid labs in acceptable range.  He is not on thyroid hormone replacement.  No other major risk factors.    Palpitations:  No  Changes to hair or skin: No  Diarrhea/Constipation:No  Changes in vision:No  Dysphagia or Shortness of breath:No  Tremors:No    PMH/PSH:  Past Medical History:   Diagnosis Date     No active medical problems      Past Surgical History:   Procedure Laterality Date     NO HISTORY OF SURGERY       Family Hx:  Family History   Problem Relation Age of Onset     Diabetes Father 58        Type II diabetes     Cerebrovascular Disease Father      Diabetes Paternal Grandmother      Colon Cancer No family hx of      Prostate Cancer No family hx of      Coronary Artery Disease No family hx of      Hypertension No family hx of      Hyperlipidemia No family hx of      Breast Cancer No family hx of      Thyroid Disease No family hx of      Thyroid disease: No           Social Hx:  Social History     Socioeconomic History     Marital status:      Spouse name: Not on file     Number of children: Not on file     Years of education: Not on file     Highest education level: Not on file   Occupational History     Not on file   Tobacco Use     Smoking status: Never     Smokeless tobacco: Never   Vaping Use     Vaping status: Never Used   Substance and Sexual Activity     Alcohol use: Never     Drug use: Never     Sexual activity: Yes     Partners: Female   Other Topics Concern     Parent/sibling w/ CABG, MI or angioplasty before 65F 55M? No   Social History Narrative     Not on file     Social Determinants of Health     Financial Resource Strain: Not on file   Food Insecurity: Not on file   Transportation Needs: Not on file   Physical Activity: Not on file   Stress: Not on file   Social Connections: Not on file   Intimate Partner Violence:  Not on file   Housing Stability: Not on file          MEDICATIONS:  has a current medication list which includes the following prescription(s): acetaminophen, amlodipine, lisinopril, and tizanidine.    Review of Systems  10 point ROS neg other than the symptoms noted above in the HPI.    Physical Exam   VS: There were no vitals taken for this visit.  GENERAL: healthy, alert and no distress  EYES: Eyes grossly normal to inspection, conjunctivae and sclerae normal  ENT: no nose swelling, nasal discharge.  Thyroid: no apparent thyroid nodules  RESP: no audible wheeze, cough, or visible cyanosis.  No visible retractions or increased work of breathing.  Able to speak fully in complete sentences.  ABDO: not evaluated.  EXTREMITIES: no hand tremors.  NEURO: Cranial nerves grossly intact, mentation intact and speech normal  SKIN: No apparent skin lesions, rash or edema seen   PSYCH: mentation appears normal, affect normal/bright, judgement and insight intact, normal speech and appearance well-groomed    LABS:  TFTs:  ENDO THYROID LABS-[facility] Latest Ref Rng & Units 11/23/2022   THYROID STIMULATING HORMONE (R) 0.30 - 4.20 uIU/mL 0.88     CT neck:  Incidental left thyroid nodule measuring over 2 cm for which  ultrasound correlation would be typically recommended if not  previously performed.    Thyroid Ultrasound 4/2023:  FINDINGS:  RIGHT lobe: 5.7 x 2.0 x 2.1 cm. Heterogenous echotexture.  Isthmus: 9 mm.  LEFT lobe: 5.3 x 2.2 x 2.6 cm. Heterogenous echotexture.     Heterogeneous multinodular thyroid gland. The most significant  discrete nodules are described below.  NODULES:     Nodule 1: A 1.6 x 1.2 x 0.9 cm nodule in the mid right lobe   Composition: Solid or almost completely solid, 2 points   Echogenicity: Hyperechoic or isoechoic, 1 point   Shape: Not taller than wide, 0 points   Margin: Ill-defined, 0 points   Echogenic Foci: None, or large comet-tail artifacts, 0 points   Point Total: 3 points. TI-RADS 3. If 2.5 cm or  larger, recommend FNA;  if 1.5 cm or larger, recommend follow up US at 1, 3, and 5 years.      Nodule 2: A 2.2 x 1.6 x 1.5 cm nodule in the mid-inferior right lobe  Composition: Solid or almost completely solid, 2 points   Echogenicity: Hypoechoic, 2 points   Shape: Wider-than-tall, 0 points   Margin: Ill-defined, 0 points   Echogenic Foci: None, or large comet-tail artifacts, 0 points   Point Total: 4 points. TI-RADS 4. If 1.5 cm or larger, recommend FNA;  if 1 cm or larger, follow up US (annually for 5 years).     Nodule 3: A 1.3 x 1.1 x 0.5 cm nodule in the mid left lobe.  Composition: Mixed cystic and solid, 1 point   Echogenicity: Hyperechoic or isoechoic, 1 point   Shape: Wider-than-tall, 0 points   Margin: Ill-defined, 0 points   Echogenic Foci: None, or large comet-tail artifacts, 0 points   Point Total: 2 points. TI-RADS 2. No FNA.     Nodule 4: A 3.4 x 1.8 x 1.8 cm nodule in the mid-inferior posterior  aspect of the left lobe, corresponding to the nodule seen on the  cervical spine CT.  Composition: Solid or almost completely solid, 2 points   Echogenicity: Hypoechoic, 2 points   Shape: Wider-than-tall, 0 points   Margin: Ill-defined, 0 points   Echogenic Foci: None, or large comet-tail artifacts, 0 points   Point Total: 4 points. TI-RADS 4. If 1.5 cm or larger, recommend FNA;  if 1 cm or larger, follow up US (annually for 5 years).     Nodule 5: A 2.2 x 1.7 x 1.0 cm nodule in the isthmus  Composition: Solid or almost completely solid, 2 points   Echogenicity: Hyperechoic or isoechoic, 1 point   Shape: Wider-than-tall, 0 points   Margin: Smooth, 0 points   Echogenic Foci: None, or large comet-tail artifacts, 0 points   Point Total: 3 points. TI-RADS 3. If 2.5 cm or larger, recommend FNA;  if 1.5 cm or larger, recommend follow up US at 1, 3, and 5 years.                                                                      IMPRESSION:  1.  Multinodular heterogeneous thyroid gland.  2.  Two TI-RADS-4 nodules,  measuring 3.4 cm in the mid-inferior left  lobe and 2.2 cm in the mid-inferior right lobe meet criteria for FNA.  Other TI-RADS-3 nodules should be followed with ultrasound.        All pertinent notes, labs, and images personally reviewed by me.     A/P  Mr.David NATALIA Oneal is a 58 year old here for the evaluation of thyroid nodule:  #1 Thyroid Nodule:  Multiple thyroid nodules are seen on US.  5/2023--s/p FNA of nodule #2 and nodule #4--> both c/w benign findings.  No FH of thyroid cancer  No history of radiation  No compressive s/s  Thyroid labs in acceptable range.  He is not on thyroid hormone replacement.  No other major risk factors.  Plan:  Discussed diagnosis, pathophysiology, management and treatment options of condition with pt.  Plan to continue to monitor.  Discussed biopsy results and benign findings.  Thyroid labs and thyroid ultrasound in 4/2024.  Please make a lab appointment for blood work and ultrasound appointment and follow up clinic appointment in 1 week after that to discuss results.  Discussed compressive s/s to monitor.    Causes of thyroid Nodules: Benign (Multinodular goiter, Hashimoto s thyroiditis, Simple or hemorrhagic cysts, Follicular adenomas, Subacute thyroiditis) or Malignant(Papillary carcinoma, Follicular carcinoma, Hürthle cell carcinoma, Medullary carcinoma, Anaplastic carcinoma, Primary thyroid lymphoma, or Metastatic malignant lesion).    MultiNodule:  The risk of cancer is not significantly higher in palpable solitary thyroid nodules than in multinodular lesions or in nodules in diffuse goiters. In multinodular thyroid glands, the cytologic sampling should be focused on lesions characterized by suspicious US features rather than on larger or clinically dominant nodules.    Cyst:  Most complex thyroid nodules with a dominant fluid component are benign. USFNA, however, should always be performed because the rare papillary thyroid carcinoma (PTC) can be cystic. An  unsatisfactory (nondiagnostic) specimen usually results from a cystic nodule that yields few or no follicular cells. Reaspiration yields satisfactory results in 50% of cases.    All questions were answered.  The patient indicates understanding of the above issues and agrees with the plan set forth.    Follow-up:  As noted in AVS    Kimberlyn Brooks MD  Endocrinology   [facility]    Cc: Franco Almazan    Addendum to above note and clinic visit:    Labs reviewed.    See result note/telephone encounter.      Answers for HPI/ROS submitted by the patient on 6/4/2023  General Symptoms: No  Skin Symptoms: No  HENT Symptoms: No  EYE SYMPTOMS: No  HEART SYMPTOMS: No  LUNG SYMPTOMS: No  INTESTINAL SYMPTOMS: No  URINARY SYMPTOMS: No  REPRODUCTIVE SYMPTOMS: No  SKELETAL SYMPTOMS: No  BLOOD SYMPTOMS: No  NERVOUS SYSTEM SYMPTOMS: No  MENTAL HEALTH SYMPTOMS: No          Again, thank you for allowing me to participate in the care of your patient.        Sincerely,        Kimberlyn Brooks MD

## 2023-06-18 ENCOUNTER — HEALTH MAINTENANCE LETTER (OUTPATIENT)
Age: 58
End: 2023-06-18

## 2023-10-11 ENCOUNTER — VIRTUAL VISIT (OUTPATIENT)
Dept: SLEEP MEDICINE | Facility: CLINIC | Age: 58
End: 2023-10-11
Payer: COMMERCIAL

## 2023-10-11 VITALS — WEIGHT: 255 LBS | HEIGHT: 70 IN | BODY MASS INDEX: 36.51 KG/M2

## 2023-10-11 DIAGNOSIS — G47.33 OSA (OBSTRUCTIVE SLEEP APNEA): Primary | ICD-10-CM

## 2023-10-11 PROCEDURE — 99203 OFFICE O/P NEW LOW 30 MIN: CPT | Mod: VID | Performed by: INTERNAL MEDICINE

## 2023-10-11 ASSESSMENT — SLEEP AND FATIGUE QUESTIONNAIRES
HOW LIKELY ARE YOU TO NOD OFF OR FALL ASLEEP IN A CAR, WHILE STOPPED FOR A FEW MINUTES IN TRAFFIC: WOULD NEVER DOZE
HOW LIKELY ARE YOU TO NOD OFF OR FALL ASLEEP WHILE SITTING INACTIVE IN A PUBLIC PLACE: WOULD NEVER DOZE
HOW LIKELY ARE YOU TO NOD OFF OR FALL ASLEEP WHILE SITTING AND TALKING TO SOMEONE: WOULD NEVER DOZE
HOW LIKELY ARE YOU TO NOD OFF OR FALL ASLEEP WHILE WATCHING TV: SLIGHT CHANCE OF DOZING
HOW LIKELY ARE YOU TO NOD OFF OR FALL ASLEEP WHILE LYING DOWN TO REST IN THE AFTERNOON WHEN CIRCUMSTANCES PERMIT: SLIGHT CHANCE OF DOZING
HOW LIKELY ARE YOU TO NOD OFF OR FALL ASLEEP WHEN YOU ARE A PASSENGER IN A CAR FOR AN HOUR WITHOUT A BREAK: SLIGHT CHANCE OF DOZING
HOW LIKELY ARE YOU TO NOD OFF OR FALL ASLEEP WHILE SITTING QUIETLY AFTER LUNCH WITHOUT ALCOHOL: SLIGHT CHANCE OF DOZING
HOW LIKELY ARE YOU TO NOD OFF OR FALL ASLEEP WHILE SITTING AND READING: MODERATE CHANCE OF DOZING

## 2023-10-11 ASSESSMENT — PAIN SCALES - GENERAL: PAINLEVEL: NO PAIN (0)

## 2023-10-11 NOTE — NURSING NOTE
Is the patient currently in the state of [place]? YES    Visit mode:VIDEO    If the visit is dropped, the patient can be reconnected by: VIDEO VISIT: Text to cell phone:   Telephone Information:   Mobile [phone]       Will anyone else be joining the visit? NO  (If patient encounters technical issues they should call [phone]    How would you like to obtain your AVS? MyChart    Are changes needed to the allergy or medication list? No    Reason for visit: RECHECK    Minna DELUCA    Has patient had flu shot for current/most recent flu season? If so, when? No

## 2023-10-11 NOTE — PATIENT INSTRUCTIONS
"          MY TREATMENT INFORMATION FOR SLEEP APNEA-  Eliseo FISHER [facility]    DOCTOR : SCOTT ROWELL MD    Am I having a sleep study at a sleep center?  --->Due to normal delays, you will be contacted within 2-4 weeks to schedule    Am I having a home sleep study?  --->Watch the video for the device you are using:    -/drop off device-   https://www.Sense Networksube.com/watch?v=yGGFBdELGhk    -Disposable device sent out require phone/computer application-   https://www.Tyba.com/watch?v=BCce_vbiwxE      Frequently asked questions:  1. What is Obstructive Sleep Apnea (SCOTT)? SCOTT is the most common type of sleep apnea. Apnea means, \"without breath.\"  Apnea is most often caused by narrowing or collapse of the upper airway as muscles relax during sleep.   Almost everyone has occasional apneas. Most people with sleep apnea have had brief interruptions at night frequently for many years.  The severity of sleep apnea is related to how frequent and severe the events are.   2. What are the consequences of SCOTT? Symptoms include: feeling sleepy during the day, snoring loudly, gasping or stopping of breathing, trouble sleeping, and occasionally morning headaches or heartburn at night.  Sleepiness can be serious and even increase the risk of falling asleep while driving. Other health consequences may include development of high blood pressure and other cardiovascular disease in persons who are susceptible. Untreated SCOTT  can contribute to heart disease, stroke and diabetes.   3. What are the treatment options? In most situations, sleep apnea is a lifelong disease that must be managed with daily therapy. Medications are not effective for sleep apnea and surgery is generally not considered until other therapies have been tried. Your treatment is your choice . Continuous Positive Airway (CPAP) works right away and is the therapy that is effective in nearly everyone. An oral device to hold your jaw forward is usually the next most " reliable option. Other options include postioning devices (to keep you off your back), weight loss, and surgery including a tongue pacing device. There is more detail about some of these options below.  4. Are my sleep studies covered by insurance? Although we will request verification of coverage, we advise you also check in advance of the study to ensure there is coverage.    Important tips for those choosing CPAP and similar devices  For new devices, sign up for device MECCA to monitor your device for your followup visits  We encourage you to utilize the [employer] mecca or website (myAir web (resmed.com) ) to monitor your therapy progress and share the data with your healthcare team when you discuss your sleep apnea.                                                    Know your equipment:  CPAP is continuous positive airway pressure that prevents obstructive sleep apnea by keeping the throat from collapsing while you are sleeping. In most cases, the device is  smart  and can slowly self-adjusts if your throat collapses and keeps a record every day of how well you are treated-this information is available to you and your care team.  BPAP is bilevel positive airway pressure that keeps your throat open and also assists each breath with a pressure boost to maintain adequate breathing.  Special kinds of BPAP are used in patients who have inadequate breathing from lung or heart disease. In most cases, the device is  smart  and can slowly self-adjusts to assist breathing. Like CPAP, the device keeps a record of how well you are treated.  Your mask is your connection to the device. You get to choose what feels most comfortable and the staff will help to make sure if fits. Here: are some examples of the different masks that are available:       Key points to remember on your journey with sleep apnea:  Sleep study.  PAP devices often need to be adjusted during a sleep study to show that they are effective and adjusted  right.  Good tips to remember: Try wearing just the mask during a quiet time during the day so your body adapts to wearing it. A humidifier is recommended for comfort in most cases to prevent drying of your nose and throat. Allergy medication from your provider may help you if you are having nasal congestion.  Getting settled-in. It takes more than one night for most of us to get used to wearing a mask. Try wearing just the mask during a quiet time during the day so your body adapts to wearing it. A humidifier is recommended for comfort in most cases. Our team will work with you carefully on the first day and will be in contact within 4 days and again at 2 and 4 weeks for advice and remote device adjustments. Your therapy is evaluated by the device each day.   Use it every night. The more you are able to sleep naturally for 7-8 hours, the more likely you will have good sleep and to prevent health risks or symptoms from sleep apnea. Even if you use it 4 hours it helps. Occasionally all of us are unable to use a medical therapy, in sleep apnea, it is not dangerous to miss one night.   Communicate. Call our skilled team on the number provided on the first day if your visit for problems that make it difficult to wear the device. Over 2 out of 3 patients can learn to wear the device long-term with help from our team. Remember to call our team or your sleep providers if you are unable to wear the device as we may have other solutions for those who cannot adapt to mask CPAP therapy. It is recommended that you sleep your sleep provider within the first 3 months and yearly after that if you are not having problems.   Use it for your health. We encourage use of CPAP masks during daytime quiet periods to allow your face and brain to adapt to the sensation of CPAP so that it will be a more natural sensation to awaken to at night or during naps. This can be very useful during the first few weeks or months of adapting to CPAP  though it does not help medically to wear CPAP during wakefulness and  should not be used as a strategy just to meet guidelines.  Take care of your equipment. Make sure you clean your mask and tubing using directions every day and that your filter and mask are replaced as recommended or if they are not working.     BESIDES CPAP, WHAT OTHER THERAPIES ARE THERE?    Positioning Device  Positioning devices are generally used when sleep apnea is mild and only occurs on your back.This example shows a pillow that straps around the waist. It may be appropriate for those whose sleep study shows milder sleep apnea that occurs primarily when lying flat on one's back. Preliminary studies have shown benefit but effectiveness at home may need to be verified by a home sleep test. These devices are generally not covered by medical insurance.  Examples of devices that maintain sleeping on the back to prevent snoring and mild sleep apnea.    Belt type body positioner  http://W[employer]Car/    Electronic reminder  http://nightshifttherapy.[employer]/            Oral Appliance  What is oral appliance therapy?  An oral appliance device fits on your teeth at night like a retainer used after having braces. The device is made by a specialized dentist and requires several visits over 1-2 months before a manufactured device is made to fit your teeth and is adjusted to prevent your sleep apnea. Once an oral device is working properly, snoring should be improved. A home sleep test may be recommended at that time if to determine whether the sleep apnea is adequately treated.       Some things to remember:  -Oral devices are often, but not always, covered by your medical insurance. Be sure to check with your insurance provider.   -If you are referred for oral therapy, you will be given a list of specialized dentists to consider or you may choose to visit the Web site of the American Academy of Dental Sleep Medicine  -Oral devices are less likely to work  if you have severe sleep apnea or are extremely overweight.     More detailed information  An oral appliance is a small acrylic device that fits over the upper and lower teeth  (similar to a retainer or a mouth guard). This device slightly moves jaw forward, which moves the base of the tongue forward, opens the airway, improves breathing for effective treat snoring and obstructive sleep apnea in perhaps 7 out of 10 people .  The best working devices are custom-made by a dental device  after a mold is made of the teeth 1, 2, 3.  When is an oral appliance indicated?  Oral appliance therapy is recommended as a first-line treatment for patients with primary snoring, mild sleep apnea, and for patients with moderate sleep apnea who prefer appliance therapy to use of CPAP4, 5. Severity of sleep apnea is determined by sleep testing and is based on the number of respiratory events per hour of sleep.   How successful is oral appliance therapy?  The success rate of oral appliance therapy in patients with mild sleep apnea is 75-80% while in patients with moderate sleep apnea it is 50-70%. The chance of success in patients with severe sleep apnea is 40-50%. The research also shows that oral appliances have a beneficial effect on the cardiovascular health of SCOTT patients at the same magnitude as CPAP therapy7.  Oral appliances should be a second-line treatment in cases of severe sleep apnea, but if not completely successful then a combination therapy utilizing CPAP plus oral appliance therapy may be effective. Oral appliances tend to be effective in a broad range of patients although studies show that the patients who have the highest success are females, younger patients, those with milder disease, and less severe obesity. 3, 6.   Finding a dentist that practices dental sleep medicine  Specific training is available through the American Academy of Dental Sleep Medicine for dentists interested in working in the field  of sleep. To find a dentist who is educated in the field of sleep and the use of oral appliances, near you, visit the Web site of the American Academy of Dental Sleep Medicine.    References  1. Miri et al. Objectively measured vs self-reported compliance during oral appliance therapy for sleep-disordered breathing. Chest 2013; 144(5): 0104-6922.  2. Esthela, et al. Objective measurement of compliance during oral appliance therapy for sleep-disordered breathing. Thorax 2013; 68(1): 91-96.  3. Mychal et al. Mandibular advancement devices in 620 men and women with SCOTT and snoring: tolerability and predictors of treatment success. Chest 2004; 125: 7599-6000.  4. Jos et al. Oral appliances for snoring and SCOTT: a review. Sleep 2006; 29: 244-262.  5. Samantha et al. Oral appliance treatment for SCOTT: an update. J Clin Sleep Med 2014; 10(2): 215-227.  6. Juni et al. Predictors of OSAH treatment outcome. J Dent Res 2007; 86: 0470-3956.      Weight Loss:    Weight loss is a long-term strategy that may improve sleep apnea in some patients.    Weight management is a personal decision and the decision should be based on your interest and the potential benefits.  If you are interested in exploring weight loss strategies, the following discussion covers the impact on weight loss on sleep apnea and the approaches that may be successful.    Being overweight does not necessarily mean you will have health consequences.  Those who have BMI over 35 or over 27 with existing medical conditions carries greater risk.   Weight loss decreases severity of sleep apnea in most people with obesity. For those with mild obesity who have developed snoring with weight gain, even 15-30 pound weight loss can improve and occasionally eliminate sleep apnea.  Structured and life-long dietary and health habits are necessary to lose weight and keep healthier weight levels.     Though there may be significant health benefits from  weight loss, long-term weight loss is very difficult to achieve- studies show success with dietary management in less than 10% of people. In addition, substantial weight loss may require years of dietary control and may be difficult if patients have severe obesity. In these cases, surgical management may be considered.  Finally, older individuals who have tolerated obesity without health complications may be less likely to benefit from weight loss strategies.      [unfilled]    Surgery:    Surgery for obstructive sleep apnea is considered generally only when other therapies fail to work. Surgery may be discussed with you if you are having a difficult time tolerating CPAP and or when there is an abnormal structure that requires surgical correction.  Nose and throat surgeries often enlarge the airway to prevent collapse.  Most of these surgeries create pain for 1-2 weeks and up to half of the most common surgeries are not effective throughout life.  You should carefully discuss the benefits and drawbacks to surgery with your sleep provider and surgeon to determine if it is the best solution for you.   More information  Surgery for SCOTT is directed at areas that are responsible for narrowing or complete obstruction of the airway during sleep.  There are a wide range of procedures available to enlarge and/or stabilize the airway to prevent blockage of breathing in the three major areas where it can occur: the palate, tongue, and nasal regions.  Successful surgical treatment depends on the accurate identification of the factors responsible for obstructive sleep apnea in each person.  A personalized approach is required because there is no single treatment that works well for everyone.  Because of anatomic variation, consultation with an examination by a sleep surgeon is a critical first step in determining what surgical options are best for each patient.  In some cases, examination during sedation may be recommended in  order to guide the selection of procedures.  Patients will be counseled about risks and benefits as well as the typical recovery course after surgery. Surgery is typically not a cure for a person s SCOTT.  However, surgery will often significantly improve one s SCOTT severity (termed  success rate ).  Even in the absence of a cure, surgery will decrease the cardiovascular risk associated with OSA7; improve overall quality of life8 (sleepiness, functionality, sleep quality, etc).      Palate Procedures:  Patients with SCOTT often have narrowing of their airway in the region of their tonsils and uvula.  The goals of palate procedures are to widen the airway in this region as well as to help the tissues resist collapse.  Modern palate procedure techniques focus on tissue conservation and soft tissue rearrangement, rather than tissue removal.  Often the uvula is preserved in this procedure. Residual sleep apnea is common in patient after pharyngoplasty with an average reduction in sleep apnea events of 33%2.      Tongue Procedures:  ExamWhile patients are awake, the muscles that surround the throat are active and keep this region open for breathing. These muscles relax during sleep, allowing the tongue and other structures to collapse and block breathing.  There are several different tongue procedures available.  Selection of a tongue base procedure depends on characteristics seen on physical exam.  Generally, procedures are aimed at removing bulky tissues in this area or preventing the back of the tongue from falling back during sleep.  Success rates for tongue surgery range from 50-62%3.    Hypoglossal Nerve Stimulation:  Hypoglossal nerve stimulation has recently received approval from the [place] Food and Drug Administration for the treatment of obstructive sleep apnea.  This is based on research showing that the system was safe and effective in treating sleep apnea6.  Results showed that the median AHI score  decreased 68%, from 29.3 to 9.0. This therapy uses an implant system that senses breathing patterns and delivers mild stimulation to airway muscles, which keeps the airway open during sleep.  The system consists of three fully implanted components: a small generator (similar in size to a pacemaker), a breathing sensor, and a stimulation lead.  Using a small handheld remote, a patient turns the therapy on before bed and off upon awakening.    Candidates for this device must be greater than 18 years of age, have moderate to severe SCOTT (AHI between 15-65), BMI less than 35, have tried CPAP/oral appliance for at least 8 weeks without success, and have appropriate upper airway anatomy (determined by a sleep endoscopy performed by Dr. Elver Lloyd).    Hypoglossal Nerve Stimulation Pathway:    The sleep surgeon s office will work with the patient through the insurance prior-authorization process (including communications and appeals).    Nasal Procedures:  Nasal obstruction can interfere with nasal breathing during the day and night.  Studies have shown that relief of nasal obstruction can improve the ability of some patients to tolerate positive airway pressure therapy for obstructive sleep apnea1.  Treatment options include medications such as nasal saline, topical corticosteroid and antihistamine sprays, and oral medications such as antihistamines or decongestants. Non-surgical treatments can include external nasal dilators for selected patients. If these are not successful by themselves, surgery can improve the nasal airway either alone or in combination with these other options.      Combination Procedures:  Combination of surgical procedures and other treatments may be recommended, particularly if patients have more than one area of narrowing or persistent positional disease.  The success rate of combination surgery ranges from 66-80%2,3.    References  Colten FRIAS. The Role of the Nose in Snoring and Obstructive  Sleep Apnoea: An Update.  Eur Arch Otorhinolaryngol. 2011; 268: 1365-73.   Laura SM; Royce JA; Laury JR; Pallanch JF; Juan Pablo MB; Gonzalo SG; David SIMON. Surgical modifications of the upper airway for obstructive sleep apnea in adults: a systematic review and meta-analysis. SLEEP 2010;33(10):4518-3608. Isabela EWING. Hypopharyngeal surgery in obstructive sleep apnea: an evidence-based medicine review.  Arch Otolaryngol Head Neck Surg. 2006 Feb;132(2):206-13.  Fernando YH1, Chacha Y, Keith KEESHA. The efficacy of anatomically based multilevel surgery for obstructive sleep apnea. Otolaryngol Head Neck Surg. 2003 Oct;129(4):327-35.  Kezirian E, Goldberg A. Hypopharyngeal Surgery in Obstructive Sleep Apnea: An Evidence-Based Medicine Review. Arch Otolaryngol Head Neck Surg. 2006 Feb;132(2):206-13.  Lamar CORTÉS et al. Upper-Airway Stimulation for Obstructive Sleep Apnea.  N Engl J Med. 2014 Jan 9;370(2):139-49.  Peker Y et al. Increased Incidence of Cardiovascular Disease in Middle-aged Men with Obstructive Sleep Apnea. Am J Respir Crit Care Med; 2002 166: 159-165  Cedeño EM et al. Studying Life Effects and Effectiveness of Palatopharyngoplasty (SLEEP) study: Subjective Outcomes of Isolated Uvulopalatopharyngoplasty. Otolaryngol Head Neck Surg. 2011; 144: 623-631.        WHAT IF I ONLY HAVE SNORING?    Mandibular advancement devices, lateral sleep positioning, long-term weight loss and treatment of nasal allergies have been shown to improve snoring.  Exercising tongue muscles with a game (https://apps.Onyu.DCITS/us/mecca/soundly-reduce-snoring/kw7700541227) or stimulating the tongue during the day with a device (https://doi.org/10.3390/kpn18662156) have improved snoring in some individuals.    Remember to Drive Safe... Drive Alive     Sleep health profoundly affects your health, mood, and your safety.  Thirty three percent of the population (one in three of us) is not getting enough sleep and many have a sleep disorder. Not getting  enough sleep or having an untreated / undertreated sleep condition may make us sleepy without even knowing it. In fact, our driving could be dramatically impaired due to our sleep health. As your provider, here are some things I would like you to know about driving:     Here are some warning signs for impairment and dangerous drowsy driving:              -Having been awake more than 16 hours               -Looking tired               -Eyelid drooping              -Head nodding (it could be too late at this point)              -Driving for more than 30 minutes     Some things you could do to make the driving safer if you are experiencing some drowsiness:              -Stop driving and rest              -Call for transportation              -Make sure your sleep disorder is adequately treated     Some things that have been shown NOT to work when experiencing drowsiness while driving:              -Turning on the radio              -Opening windows              -Eating any  distracting  /  entertaining  foods (e.g., sunflower seeds, candy, or any other)              -Talking on the phone      Your decision may not only impact your life, but also the life of others. Please, remember to drive safe for yourself and all of us.

## 2023-10-11 NOTE — PROGRESS NOTES
Virtual Visit Details    Type of service:  Video Visit     Originating Location (pt. Location): Home    Distant Location (provider location):  Off-site  Platform used for Video Visit: Maria T

## 2023-10-11 NOTE — PROGRESS NOTES
[facility]   Outpatient Sleep Medicine Follow-up Visit  October 11, 2023    Name: Eliseo Oneal MRN# [id number]   Age: 58 year old YOB: 1965     Date of Consultation: October 11, 2023  Consultation is requested by: No referring provider defined for this encounter.  Primary care provider: Franco Almazan           Assessment and Plan:     Sleep Diagnoses:Sleep Diagnoses:  Severe obstructive sleep apnea    Comorbid conditions:  Hypertension    Summary Recommendations:  Patient is to go to the clinic or his original room to have his device download for reports on current settings and effectiveness.  We have placed orders for supplies including a pillow mask and replacement CPAP machine to be issued after his download.  Return to clinic in 2 years or earlier if you have occurrences of snoring, if initiating sleep.         History of Present Illness:     Eliseo Oneal is a 58 year old male with a history of severe obstructive sleep apnea was using CPAP nightly however his modem is obsolete and currently not transmitting download information regarding settings or effectiveness.  He agrees that improvement with initiating CPAP therapy.      PREVIOUS SLEEP STUDIES:  Date:HST- 2/17/2014 AHI of 43.9 per hour.          Most Recent SCALES:    EPWORTH SLEEPINESS SCALE WITHIN 1 YEAR WITHIN 10 DAYS   Sitting and reading 2 2   Watching TV 1 1   Sitting, inactive in a public place (theatre or mtg.) 0  0    As a passenger in a car 1 1   Lying down to rest in the afternoon when circumstance permit 1 1   Sitting and talking to someone 0 0   Sitting quietly after lunch without alcohol 1 1   In a car, while stopped for a few minutes in traffic 0 0   TOTAL SCORE 6 6   Normal < 11         0--none    1--mild    2--moderate  3--severe      INSOMNIA SEVERITY INDEX WITHIN 1 YEAR   Difficulty falling asleep 0   Difficult staying asleep 0   Problems waking up to early 0   How  SATISFIED/DISSATISFIED are you with your CURRENT sleep pattern? 0   How NOTICEABLE to others do you think your sleep pattern is in terms of your quality of life? 0   How WORRIED/DISTRESSED are you about your current sleep pattern? 0   To what extent do you consider your sleep problem to INTERFERE with your daily fuctioning(e.g. daytime fatigue, mood, ability to function at work/daily chores, concentration, mood,etc.) CURRENTLY? 0   INSOMNIA SEVERITY INDEX TOTAL SCORE 0    --absence of insomnia (0-7); sub-threshold insomnia (8-14); moderate insomnia (15-21); and severe insomnia (22-28)--               Medications:     Current Outpatient Medications   Medication Sig    acetaminophen (TYLENOL) 325 MG tablet Take 2 tablets (650 mg) by mouth every 4 hours as needed for mild pain    amLODIPine (NORVASC) 5 MG tablet Take 1 tablet (5 mg) by mouth daily    lisinopril (ZESTRIL) 40 MG tablet TAKE ONE TABLET BY MOUTH EVERY DAY    tiZANidine (ZANAFLEX) 2 MG tablet Take 1 tablet (2 mg) by mouth 2 times daily as needed for muscle spasms (Patient not taking: Reported on 11/23/2022)     No current facility-administered medications for this visit.        No Known Allergies         Problem List:     Patient Active Problem List   Diagnosis    CARDIOVASCULAR SCREENING; LDL GOAL LESS THAN 160    Obesity (BMI 30-39.9)    Hypertension goal BP (blood pressure) < 140/90    SCOTT (obstructive sleep apnea)    Ventral hernia without obstruction or gangrene    Obesity (BMI 35.0-39.9) with comorbidity (H)    Suspected COVID-19 virus infection    Pneumonitis    Cervical myofascial strain, initial encounter    Muscle strain of right scapular region, initial encounter    Non-toxic multinodular goiter    Thyroid nodule            Past Medical History:     Does not need 02 supplement at night   Past Medical History:   Diagnosis Date    No active medical problems              Past Surgical History:    No h/o  upper airway surgery  Past Surgical History:  "  Procedure Laterality Date    NO HISTORY OF SURGERY                Physical Examination:   Objective:  Vitals: Ht 1.778 m (5' 10\")   Wt 115.7 kg (255 lb)   BMI 36.59 kg/m    BMI= Body mass index is 36.59 kg/m .    GENERAL: Healthy, alert and no distress  EYES: Eyes grossly normal to inspection.  No discharge or erythema, or obvious scleral/conjunctival abnormalities.  RESP: No audible wheeze, cough, or visible cyanosis.  No visible retractions or increased work of breathing.    SKIN: Visible skin clear. No significant rash, abnormal pigmentation or lesions.  NEURO: Cranial nerves grossly intact.  Mentation and speech appropriate for age.  PSYCH: Mentation appears normal, affect normal/bright, judgement and insight intact, normal speech and appearance well-groomed.        Copy to: Franco Almazan MD 10/11/2023         Total time spent reviewing medical records including previous testing and interpretation as well as direct patient contact and documentation on this date: 30 min   "

## 2023-10-13 ENCOUNTER — DOCUMENTATION ONLY (OUTPATIENT)
Dept: HOME HEALTH SERVICES | Facility: CLINIC | Age: 58
End: 2023-10-13
Payer: COMMERCIAL

## 2023-10-19 DIAGNOSIS — I10 HYPERTENSION GOAL BP (BLOOD PRESSURE) < 140/90: ICD-10-CM

## 2023-10-19 RX ORDER — LISINOPRIL 40 MG/1
TABLET ORAL
Qty: 30 TABLET | Refills: 2 | Status: SHIPPED | OUTPATIENT
Start: 2023-10-19 | End: 2024-02-05

## 2023-10-19 RX ORDER — AMLODIPINE BESYLATE 5 MG/1
5 TABLET ORAL DAILY
Qty: 30 TABLET | Refills: 2 | Status: SHIPPED | OUTPATIENT
Start: 2023-10-19 | End: 2024-02-05

## 2024-02-05 ENCOUNTER — MYC REFILL (OUTPATIENT)
Dept: FAMILY MEDICINE | Facility: CLINIC | Age: 59
End: 2024-02-05
Payer: COMMERCIAL

## 2024-02-05 DIAGNOSIS — I10 HYPERTENSION GOAL BP (BLOOD PRESSURE) < 140/90: ICD-10-CM

## 2024-02-05 RX ORDER — LISINOPRIL 40 MG/1
TABLET ORAL
Qty: 90 TABLET | Refills: 1 | Status: SHIPPED | OUTPATIENT
Start: 2024-02-05 | End: 2024-07-31

## 2024-02-05 RX ORDER — AMLODIPINE BESYLATE 5 MG/1
5 TABLET ORAL DAILY
Qty: 90 TABLET | Refills: 1 | Status: SHIPPED | OUTPATIENT
Start: 2024-02-05 | End: 2024-08-02

## 2024-07-28 DIAGNOSIS — I10 HYPERTENSION GOAL BP (BLOOD PRESSURE) < 140/90: ICD-10-CM

## 2024-07-29 RX ORDER — LISINOPRIL 40 MG/1
TABLET ORAL
Qty: 30 TABLET | Refills: 5 | OUTPATIENT
Start: 2024-07-29

## 2024-07-29 RX ORDER — AMLODIPINE BESYLATE 5 MG/1
5 TABLET ORAL DAILY
Qty: 30 TABLET | Refills: 5 | OUTPATIENT
Start: 2024-07-29

## 2024-07-31 ENCOUNTER — MYC REFILL (OUTPATIENT)
Dept: FAMILY MEDICINE | Facility: CLINIC | Age: 59
End: 2024-07-31
Payer: COMMERCIAL

## 2024-07-31 DIAGNOSIS — I10 HYPERTENSION GOAL BP (BLOOD PRESSURE) < 140/90: ICD-10-CM

## 2024-08-01 RX ORDER — LISINOPRIL 40 MG/1
TABLET ORAL
Qty: 90 TABLET | Refills: 0 | Status: SHIPPED | OUTPATIENT
Start: 2024-08-01 | End: 2024-08-28

## 2024-08-01 NOTE — TELEPHONE ENCOUNTER
Looks like she has not been seen here since 2020, has had some specialist encounters.  Recommend wellness and med check visit, 1 refill sent.  Please schedule appointment.

## 2024-08-02 ENCOUNTER — MYC REFILL (OUTPATIENT)
Dept: FAMILY MEDICINE | Facility: CLINIC | Age: 59
End: 2024-08-02
Payer: COMMERCIAL

## 2024-08-02 DIAGNOSIS — I10 HYPERTENSION GOAL BP (BLOOD PRESSURE) < 140/90: ICD-10-CM

## 2024-08-02 RX ORDER — AMLODIPINE BESYLATE 5 MG/1
5 TABLET ORAL DAILY
Qty: 90 TABLET | Refills: 0 | Status: SHIPPED | OUTPATIENT
Start: 2024-08-02 | End: 2024-08-28

## 2024-08-11 ENCOUNTER — HEALTH MAINTENANCE LETTER (OUTPATIENT)
Age: 59
End: 2024-08-11

## 2024-08-28 ENCOUNTER — TELEPHONE (OUTPATIENT)
Dept: FAMILY MEDICINE | Facility: CLINIC | Age: 59
End: 2024-08-28
Payer: COMMERCIAL

## 2024-08-28 DIAGNOSIS — I10 HYPERTENSION GOAL BP (BLOOD PRESSURE) < 140/90: ICD-10-CM

## 2024-08-28 RX ORDER — AMLODIPINE BESYLATE 5 MG/1
5 TABLET ORAL DAILY
Qty: 90 TABLET | Refills: 0 | Status: SHIPPED | OUTPATIENT
Start: 2024-08-28

## 2024-08-28 RX ORDER — LISINOPRIL 40 MG/1
TABLET ORAL
Qty: 90 TABLET | Refills: 0 | Status: SHIPPED | OUTPATIENT
Start: 2024-08-28

## 2024-08-28 NOTE — TELEPHONE ENCOUNTER
Reason for call:  Other   Patient called regarding (reason for call): call back  Additional comments: patient is out of town leaving for a cruise and forgot his meds  Can you send refill to   [facility].[phone]  [address], medical assistant    Meds are lisinopril- amlodipine    Patient is leaving tomorrow am    Phone number to reach patient:  Home number on file [phone] (home)    Best Time:  any    Can we leave a detailed message on this number?  YES    Travel screening: Not Applicable

## 2024-08-28 NOTE — TELEPHONE ENCOUNTER
Eliseo is calling back to check the status on this refill as he says he got a message from  pharmacy that medication was filed.     He thinks he did  Rx at beginning of August but forgot it at home and is traveling. Rx resent to pharmacy in [place], did inform him that it is possible insurance may not pay for entire fill or any of fill since it would be too soon for refill. He can discuss with pharmacy or insurance if questions in regards to this. He is in agreement. Rx resent due to forgotten medication left at home while traveling.     Tisha Chowdary R.N.

## 2024-10-18 ENCOUNTER — OFFICE VISIT (OUTPATIENT)
Dept: FAMILY MEDICINE | Facility: CLINIC | Age: 59
End: 2024-10-18
Payer: COMMERCIAL

## 2024-10-18 VITALS
TEMPERATURE: 97.7 F | RESPIRATION RATE: 18 BRPM | SYSTOLIC BLOOD PRESSURE: 156 MMHG | WEIGHT: 256.5 LBS | HEIGHT: 69 IN | DIASTOLIC BLOOD PRESSURE: 98 MMHG | BODY MASS INDEX: 37.99 KG/M2 | OXYGEN SATURATION: 96 % | HEART RATE: 61 BPM

## 2024-10-18 DIAGNOSIS — Z12.11 SCREEN FOR COLON CANCER: ICD-10-CM

## 2024-10-18 DIAGNOSIS — Z12.5 SCREENING PSA (PROSTATE SPECIFIC ANTIGEN): ICD-10-CM

## 2024-10-18 DIAGNOSIS — Z13.1 SCREENING FOR DIABETES MELLITUS: ICD-10-CM

## 2024-10-18 DIAGNOSIS — K43.9 VENTRAL HERNIA WITHOUT OBSTRUCTION OR GANGRENE: ICD-10-CM

## 2024-10-18 DIAGNOSIS — I10 HYPERTENSION GOAL BP (BLOOD PRESSURE) < 140/90: ICD-10-CM

## 2024-10-18 DIAGNOSIS — R21 RASH AND NONSPECIFIC SKIN ERUPTION: ICD-10-CM

## 2024-10-18 DIAGNOSIS — Z13.220 SCREENING, LIPID: ICD-10-CM

## 2024-10-18 DIAGNOSIS — Z13.0 SCREENING FOR BLOOD DISEASE: ICD-10-CM

## 2024-10-18 DIAGNOSIS — Z00.00 ROUTINE GENERAL MEDICAL EXAMINATION AT A HEALTH CARE FACILITY: Primary | ICD-10-CM

## 2024-10-18 DIAGNOSIS — Z12.83 SKIN CANCER SCREENING: ICD-10-CM

## 2024-10-18 PROCEDURE — 99396 PREV VISIT EST AGE 40-64: CPT | Performed by: PHYSICIAN ASSISTANT

## 2024-10-18 PROCEDURE — 99214 OFFICE O/P EST MOD 30 MIN: CPT | Mod: 25 | Performed by: PHYSICIAN ASSISTANT

## 2024-10-18 RX ORDER — AMLODIPINE BESYLATE 10 MG/1
10 TABLET ORAL DAILY
Qty: 90 TABLET | Refills: 0 | Status: SHIPPED | OUTPATIENT
Start: 2024-10-18

## 2024-10-18 RX ORDER — CLOBETASOL PROPIONATE 0.5 MG/G
OINTMENT TOPICAL
Qty: 60 G | Refills: 0 | Status: SHIPPED | OUTPATIENT
Start: 2024-10-18

## 2024-10-18 SDOH — HEALTH STABILITY: PHYSICAL HEALTH: ON AVERAGE, HOW MANY MINUTES DO YOU ENGAGE IN EXERCISE AT THIS LEVEL?: 30 MIN

## 2024-10-18 SDOH — HEALTH STABILITY: PHYSICAL HEALTH: ON AVERAGE, HOW MANY DAYS PER WEEK DO YOU ENGAGE IN MODERATE TO STRENUOUS EXERCISE (LIKE A BRISK WALK)?: 4 DAYS

## 2024-10-18 ASSESSMENT — SOCIAL DETERMINANTS OF HEALTH (SDOH): HOW OFTEN DO YOU GET TOGETHER WITH FRIENDS OR RELATIVES?: ONCE A WEEK

## 2024-10-18 NOTE — PATIENT INSTRUCTIONS
"  What lifestyle changes can I make to help improve my cholesterol levels?     Exercise regularly.   Exercise can raise HDL cholesterol levels and reduce levels of LDL cholesterol and triglycerides. If you haven't been exercising, try to work up to 30 minutes, 4 to 6 times a week.     Lose weight if you are overweight.   Being overweight can raise your cholesterol levels. Losing weight, even just 5 or 10 pounds, can lower your total cholesterol, LDL cholesterol and triglyceride levels.     Eat a heart-healthy diet.   Eat plenty of fresh fruits and vegetables. Fruits and vegetables are naturally low in fat. Not only do they add flavor and variety to your diet, but they are also the best source of fiber, vitamins and minerals for your body. Aim for 5 cups of fruits and vegetables every day, not counting potatoes, corn and rice. Potatoes, corn and rice count as carbohydrates.     Pick  good  fats over  bad  fats. Fat is part of a healthy diet, but you need to know the difference between  bad  fats and  good  fats. \"Bad  fats, such as saturated and trans fats, are found in foods such as butter; coconut and palm oil; saturated or partially hydrogenated vegetable fats such as shortening and margarine; animal fats in meats; and fats in whole milk dairy products. Limit the amount of saturated fat in your diet, and avoid trans fat completely. Unsaturated fat is the  good  fat. Most fats in fish, vegetables, grains and tree nuts are unsaturated. Try to eat unsaturated fat in place of saturated fat. For example, you can use olive oil or canola oil in cooking instead of butter.     Use healthier cooking methods. Baking, broiling and roasting are the healthiest ways to prepare meat, poultry and other foods. Trim any outside fat or skin before cooking. Lean cuts can be pan-broiled or stir-fried. Use either a nonstick pan or nonstick cooking spray instead of adding fats such as butter or margarine. When eating out, ask how food is " prepared. You can request that your food be baked, broiled or roasted, rather than fried.     Look for other sources of protein. Fish, dry beans, tree nuts, peas and lentils offer protein, nutrients and fiber without the cholesterol and saturated fats that meats have. Consider eating one  meatless  meal each week. Try substituting beans for meat in a favorite recipe, such as lasagna or chili. Snack on a handful of almonds or pecans. Soy is also an excellent source of protein. Good examples of soy include soy milk, edamame (green soy beans), tofu and soy protein shakes.     Get more fiber in your diet. Add good sources of fiber to your meals. Examples include fruits, vegetables, whole grains (such as oat bran, whole and rolled oats and barley), legumes (such as beans and peas) and nuts and seeds (such as ground flax seed). In addition to fiber, whole grains supply B-vitamins and important nutrients not found in foods made with white flour.     Eat more fish. Fish are an excellent source of omega-3 fatty acids. Wild-caught oily fish, such as salmon, tuna, mackerel and sardines, are the best sources of omega-3s, but all fish contain some amount of this beneficial fatty acid. Aim for 2 6-oz servings each week.       Patient Education   Preventive Care Advice   This is general advice given by our system to help you stay healthy. However, your care team may have specific advice just for you. Please talk to your care team about your preventive care needs.  Nutrition  Eat 5 or more servings of fruits and vegetables each day.  Try wheat bread, brown rice and whole grain pasta (instead of white bread, rice, and pasta).  Get enough calcium and vitamin D. Check the label on foods and aim for 100% of the RDA (recommended daily allowance).  Lifestyle  Exercise at least 150 minutes each week  (30 minutes a day, 5 days a week).  Do muscle strengthening activities 2 days a week. These help control your weight and prevent  disease.  No smoking.  Wear sunscreen to prevent skin cancer.  Have a dental exam and cleaning every 6 months.  Yearly exams  See your health care team every year to talk about:  Any changes in your health.  Any medicines your care team has prescribed.  Preventive care, family planning, and ways to prevent chronic diseases.  Shots (vaccines)   HPV shots (up to age 26), if you've never had them before.  Hepatitis B shots (up to age 59), if you've never had them before.  COVID-19 shot: Get this shot when it's due.  Flu shot: Get a flu shot every year.  Tetanus shot: Get a tetanus shot every 10 years.  Pneumococcal, hepatitis A, and RSV shots: Ask your care team if you need these based on your risk.  Shingles shot (for age 50 and up)  General health tests  Diabetes screening:  Starting at age 35, Get screened for diabetes at least every 3 years.  If you are younger than age 35, ask your care team if you should be screened for diabetes.  Cholesterol test: At age 39, start having a cholesterol test every 5 years, or more often if advised.  Bone density scan (DEXA): At age 50, ask your care team if you should have this scan for osteoporosis (brittle bones).  Hepatitis C: Get tested at least once in your life.  STIs (sexually transmitted infections)  Before age 24: Ask your care team if you should be screened for STIs.  After age 24: Get screened for STIs if you're at risk. You are at risk for STIs (including HIV) if:  You are sexually active with more than one person.  You don't use condoms every time.  You or a partner was diagnosed with a sexually transmitted infection.  If you are at risk for HIV, ask about PrEP medicine to prevent HIV.  Get tested for HIV at least once in your life, whether you are at risk for HIV or not.  Cancer screening tests  Cervical cancer screening: If you have a cervix, begin getting regular cervical cancer screening tests starting at age 21.  Breast cancer scan (mammogram): If you've ever had  breasts, begin having regular mammograms starting at age 40. This is a scan to check for breast cancer.  Colon cancer screening: It is important to start screening for colon cancer at age 45.  Have a colonoscopy test every 10 years (or more often if you're at risk) Or, ask your provider about stool tests like a FIT test every year or Cologuard test every 3 years.  To learn more about your testing options, visit:   .  For help making a decision, visit:   https://bit.ly/tg69797.  Prostate cancer screening test: If you have a prostate, ask your care team if a prostate cancer screening test (PSA) at age 55 is right for you.  Lung cancer screening: If you are a current or former smoker ages 50 to 80, ask your care team if ongoing lung cancer screenings are right for you.  For informational purposes only. Not to replace the advice of your health care provider. Copyright   2023 [facility]. All rights reserved. Clinically reviewed by the [facility]itions [facility]. [employer] 184137 - REV 01/24.

## 2024-10-18 NOTE — PROGRESS NOTES
"Preventive Care Visit  [facility] PA-C, Family Medicine  Oct 18, 2024      Assessment & Plan     Routine general medical examination at a health care facility  Age and gender appropriate preventive care and screenings are discussed.  Particular attention to personal preventive care and age appropriate lifestyle including the incorporation of healthy diet and physical activity is made       Screen for colon cancer    - Colonoscopy Screening  Referral; Future      Hypertension goal BP (blood pressure) < 140/90  Will increase amlodipine 10 mg    Will have come in for nurse only BP check x 2 and then follow-up for office visit in 1 month with BP diary.      - Albumin Random Urine Quantitative with Creat Ratio; Future  - Lipid panel reflex to direct LDL Fasting; Future  - Comprehensive metabolic panel; Future  - amLODIPine (NORVASC) 10 MG tablet; Take 1 tablet (10 mg) by mouth daily.    Screening for diabetes mellitus    - Hemoglobin A1c; Future  - Comprehensive metabolic panel; Future    Screening, lipid    - Lipid panel reflex to direct LDL Fasting; Future    Screening for blood disease      Screening PSA (prostate specific antigen)    - PSA, screen; Future    Skin cancer screening    - Adult Dermatology  Referral; Future    Rash and nonspecific skin eruption  Pt request  - clobetasol (TEMOVATE) 0.05 % external ointment; Apply ointment to affected areas on elbows twice daily x 14 days.    Ventral hernia without obstruction or gangrene  Will refer to gen surge.  Not painful.   - Adult Gen Surg  Referral; Future          BMI  Estimated body mass index is 38.43 kg/m  as calculated from the following:    Height as of this encounter: 1.74 m (5' 8.5\").    Weight as of this encounter: 116.3 kg (256 lb 8 oz).   Weight management plan: Discussed healthy diet and exercise guidelines          Latrell Whitman is a 59 year old, presenting for the " following:  Physical        10/18/2024     2:08 PM   Additional Questions   Roomed by Mely        Health Care Directive  Patient does not have a Health Care Directive or Living Will: Discussed advance care planning with patient; however, patient declined at this time.    HPI              10/18/2024   General Health   How would you rate your overall physical health? (!) FAIR   Feel stress (tense, anxious, or unable to sleep) Only a little      (!) STRESS CONCERN      10/18/2024   Nutrition   Three or more servings of calcium each day? Yes   Diet: Regular (no restrictions)   How many servings of fruit and vegetables per day? (!) 2-3   How many sweetened beverages each day? 0-1            10/18/2024   Exercise   Days per week of moderate/strenous exercise 4 days   Average minutes spent exercising at this level 30 min            10/18/2024   Social Factors   Frequency of gathering with friends or relatives Once a week   Worry food won't last until get money to buy more No   Food not last or not have enough money for food? No   Do you have housing? (Housing is defined as stable permanent housing and does not include staying ouside in a car, in a tent, in an abandoned building, in an overnight shelter, or couch-surfing.) Yes   Are you worried about losing your housing? No   Lack of transportation? No   Unable to get utilities (heat,electricity)? No            10/18/2024   Fall Risk   Fallen 2 or more times in the past year? No   Trouble with walking or balance? No             10/18/2024   Dental   Dentist two times every year? Yes            10/18/2024   TB Screening   Were you born outside of the [place]? No            Today's PHQ-2 Score:       10/18/2024     7:25 AM   PHQ-2 ( 1999 [employer])   Q1: Little interest or pleasure in doing things 0   Q2: Feeling down, depressed or hopeless 0   PHQ-2 Score 0   Q1: Little interest or pleasure in doing things Not at all   Q2: Feeling down, depressed or hopeless Not at all   PHQ-2 Score  0           10/18/2024   Substance Use   Alcohol more than 3/day or more than 7/wk No   Do you use any other substances recreationally? No        Social History     Tobacco Use    Smoking status: Never    Smokeless tobacco: Never   Vaping Use    Vaping status: Never Used   Substance Use Topics    Alcohol use: Never    Drug use: Never             10/18/2024   One time HIV Screening   Previous HIV test? No          10/18/2024   STI Screening   New sexual partner(s) since last STI/HIV test? No      Last PSA:   PSA   Date Value Ref Range Status   02/26/2020 1.81 0 - 4 ug/L Final     Comment:     Assay Method:  Chemiluminescence using Siemens Vista analyzer     Prostate Specific Antigen Screen   Date Value Ref Range Status   05/12/2022 0.46 0.00 - 4.00 ug/L Final     ASCVD Risk   The ASCVD Risk score (Cyndy SARMIENTO, et al., 2019) failed to calculate for the following reasons:    The systolic blood pressure is missing           Reviewed and updated as needed this visit by Provider                    BP Readings from Last 3 Encounters:   10/18/24 (!) 156/98   12/12/22 138/86   11/23/22 (!) 168/94    Wt Readings from Last 3 Encounters:   10/18/24 116.3 kg (256 lb 8 oz)   10/11/23 115.7 kg (255 lb)   11/23/22 118.6 kg (261 lb 6.4 oz)                  Patient Active Problem List   Diagnosis    CARDIOVASCULAR SCREENING; LDL GOAL LESS THAN 160    Obesity (BMI 30-39.9)    Hypertension goal BP (blood pressure) < 140/90    SCOTT (obstructive sleep apnea)    Ventral hernia without obstruction or gangrene    Obesity (BMI 35.0-39.9) with comorbidity (H)    Suspected COVID-19 virus infection    Pneumonitis    Cervical myofascial strain, initial encounter    Muscle strain of right scapular region, initial encounter    Non-toxic multinodular goiter    Thyroid nodule     Past Surgical History:   Procedure Laterality Date    BIOPSY      NO HISTORY OF SURGERY         Social History     Tobacco Use    Smoking status: Never    Smokeless  tobacco: Never   Substance Use Topics    Alcohol use: Never     Family History   Problem Relation Age of Onset    Diabetes Father 58        Type II diabetes    Cerebrovascular Disease Father     Diabetes Paternal Grandmother     Colon Cancer No family hx of     Prostate Cancer No family hx of     Coronary Artery Disease No family hx of     Hypertension No family hx of     Hyperlipidemia No family hx of     Breast Cancer No family hx of     Thyroid Disease No family hx of          Current Outpatient Medications   Medication Sig Dispense Refill    amLODIPine (NORVASC) 10 MG tablet Take 1 tablet (10 mg) by mouth daily. 90 tablet 0    clobetasol (TEMOVATE) 0.05 % external ointment Apply ointment to affected areas on elbows twice daily x 14 days. 60 g 0    lisinopril (ZESTRIL) 40 MG tablet TAKE 1 TABLET BY MOUTH EVERY DAY 90 tablet 0     No Known Allergies  Recent Labs   Lab Test 11/23/22  1543 05/12/22  0801 11/03/20  0734 11/02/20  0751 11/01/20  1103 10/31/20  0636 10/30/20  1833 02/26/20  0825 09/17/18  1815 11/03/17  1129 05/12/17  1629 12/19/16  0939   A1C  --  6.0*  --   --   --   --   --   --  5.6 5.7  --   --    LDL  --  110*  --   --   --   --   --  105*  --   --   --  106*   HDL  --  41  --   --   --   --   --  39*  --   --   --  41   TRIG  --  84  --   --   --   --   --  102  --   --   --  119   ALT  --  42  --   --   --  62  --   --  34  --   --  60   CR 1.12 1.06 0.85 0.93   < > 1.01   < > 0.91 0.97  --    < > 1.02   GFRESTIMATED 76 81 >60 >60   < > 82   < > >90 80  --    < > 76   GFRESTBLACK  --   --  >60 >60   < > >90   < > >90 >90  --    < > >90   GFR Calc     POTASSIUM 4.1 3.8 3.5 3.6   < > 3.9   < > 3.7 3.9  --    < > 3.5   TSH 0.88  --   --   --   --   --   --   --   --   --   --  1.11    < > = values in this interval not displayed.          Review of Systems  Constitutional, HEENT, cardiovascular, pulmonary, GI, , musculoskeletal, neuro, skin, endocrine and psych systems are  "negative, except as otherwise noted.     Objective    Exam  There were no vitals taken for this visit.   Estimated body mass index is 36.59 kg/m  as calculated from the following:    Height as of 10/11/23: 1.778 m (5' 10\").    Weight as of 10/11/23: 115.7 kg (255 lb).    Physical Exam  GENERAL: alert and no distress  EYES: Eyes grossly normal to inspection, PERRL and conjunctivae and sclerae normal  HENT: ear canals and TM's normal, nose and mouth without ulcers or lesions  NECK: no adenopathy, no asymmetry, masses, or scars  RESP: lungs clear to auscultation - no rales, rhonchi or wheezes  CV: regular rate and rhythm, normal S1 S2, no S3 or S4, no murmur, click or rub, no peripheral edema  ABDOMEN: soft, nontender, no hepatosplenomegaly, no masses and bowel sounds normal  MS: no gross musculoskeletal defects noted, no edema  SKIN: no suspicious lesions or rashes  NEURO: Normal strength and tone, mentation intact and speech normal  PSYCH: mentation appears normal, affect normal/bright        Signed Electronically by: Ramona Ann Aaseby-Aguilera, PA-C    "

## 2024-10-19 ENCOUNTER — LAB (OUTPATIENT)
Dept: LAB | Facility: CLINIC | Age: 59
End: 2024-10-19
Payer: COMMERCIAL

## 2024-10-19 DIAGNOSIS — Z13.220 SCREENING, LIPID: ICD-10-CM

## 2024-10-19 DIAGNOSIS — Z13.1 SCREENING FOR DIABETES MELLITUS: ICD-10-CM

## 2024-10-19 DIAGNOSIS — Z12.5 SCREENING PSA (PROSTATE SPECIFIC ANTIGEN): ICD-10-CM

## 2024-10-19 DIAGNOSIS — Z11.4 SCREENING FOR HIV (HUMAN IMMUNODEFICIENCY VIRUS): Primary | ICD-10-CM

## 2024-10-19 DIAGNOSIS — I10 HYPERTENSION GOAL BP (BLOOD PRESSURE) < 140/90: ICD-10-CM

## 2024-10-19 LAB
ALBUMIN SERPL BCG-MCNC: 4 G/DL (ref 3.5–5.2)
ALP SERPL-CCNC: 81 U/L (ref 40–150)
ALT SERPL W P-5'-P-CCNC: 34 U/L (ref 0–70)
ANION GAP SERPL CALCULATED.3IONS-SCNC: 10 MMOL/L (ref 7–15)
AST SERPL W P-5'-P-CCNC: 33 U/L (ref 0–45)
BILIRUB SERPL-MCNC: 0.5 MG/DL
BUN SERPL-MCNC: 14 MG/DL (ref 8–23)
CALCIUM SERPL-MCNC: 8.8 MG/DL (ref 8.8–10.4)
CHLORIDE SERPL-SCNC: 107 MMOL/L (ref 98–107)
CHOLEST SERPL-MCNC: 139 MG/DL
CREAT SERPL-MCNC: 1.03 MG/DL (ref 0.67–1.17)
CREAT UR-MCNC: 225 MG/DL
EGFRCR SERPLBLD CKD-EPI 2021: 83 ML/MIN/1.73M2
EST. AVERAGE GLUCOSE BLD GHB EST-MCNC: 128 MG/DL
FASTING STATUS PATIENT QL REPORTED: YES
FASTING STATUS PATIENT QL REPORTED: YES
GLUCOSE SERPL-MCNC: 107 MG/DL (ref 70–99)
HBA1C MFR BLD: 6.1 % (ref 0–5.6)
HCO3 SERPL-SCNC: 25 MMOL/L (ref 22–29)
HDLC SERPL-MCNC: 34 MG/DL
HIV 1+2 AB+HIV1 P24 AG SERPL QL IA: NONREACTIVE
LDLC SERPL CALC-MCNC: 82 MG/DL
MICROALBUMIN UR-MCNC: <12 MG/L
MICROALBUMIN/CREAT UR: NORMAL MG/G{CREAT}
NONHDLC SERPL-MCNC: 105 MG/DL
POTASSIUM SERPL-SCNC: 4 MMOL/L (ref 3.4–5.3)
PROT SERPL-MCNC: 6.9 G/DL (ref 6.4–8.3)
PSA SERPL DL<=0.01 NG/ML-MCNC: 0.49 NG/ML (ref 0–4.5)
SODIUM SERPL-SCNC: 142 MMOL/L (ref 135–145)
TRIGL SERPL-MCNC: 116 MG/DL

## 2024-10-19 PROCEDURE — G0103 PSA SCREENING: HCPCS

## 2024-10-19 PROCEDURE — 83036 HEMOGLOBIN GLYCOSYLATED A1C: CPT

## 2024-10-19 PROCEDURE — 82043 UR ALBUMIN QUANTITATIVE: CPT

## 2024-10-19 PROCEDURE — 80053 COMPREHEN METABOLIC PANEL: CPT

## 2024-10-19 PROCEDURE — 87389 HIV-1 AG W/HIV-1&-2 AB AG IA: CPT

## 2024-10-19 PROCEDURE — 82570 ASSAY OF URINE CREATININE: CPT

## 2024-10-19 PROCEDURE — 80061 LIPID PANEL: CPT

## 2024-10-19 PROCEDURE — 36415 COLL VENOUS BLD VENIPUNCTURE: CPT

## 2024-10-29 ENCOUNTER — ALLIED HEALTH/NURSE VISIT (OUTPATIENT)
Dept: FAMILY MEDICINE | Facility: CLINIC | Age: 59
End: 2024-10-29
Payer: COMMERCIAL

## 2024-10-29 VITALS — DIASTOLIC BLOOD PRESSURE: 109 MMHG | SYSTOLIC BLOOD PRESSURE: 176 MMHG

## 2024-10-29 DIAGNOSIS — Z01.30 BP CHECK: Primary | ICD-10-CM

## 2024-10-29 PROCEDURE — 99214 OFFICE O/P EST MOD 30 MIN: CPT

## 2024-10-29 NOTE — PROGRESS NOTES
Eliseo Oneal is a 59 year old year old patient who comes in today for a Blood Pressure check because of new medication and ongoing blood pressure monitoring.  Vital Signs  See below  notes for 3 readings   Patient is taking medication as prescribed  Patient is tolerating medications well.  Patient is not monitoring Blood Pressure at home.  Average readings if yes are   Current complaints: none  Disposition:  Patient advised to call if develops symptoms. Asymptomatic, discharged to home, will send note to PCP and call J Carlos once reviewed. J Carlos in agreement to plan.     Tisha Chowdary R.N.

## 2024-10-29 NOTE — PROGRESS NOTES
Eliseo Oneal is a 59 year old patient who comes in today for a Blood Pressure check.  Initial BP:  BP (!) 173/99 (BP Location: Left arm, Patient Position: Sitting, Cuff Size: Adult Large)      Data Unavailable  Disposition: BP elevated.  Triage RN notified, patient asked to wait    ANA PAULA Woods on 10/29/2024 7:59 AM    Eliseo Oneal is a 59 year old patient who comes in today for a Blood Pressure check.  Initial BP:  BP (!) 161/98 (BP Location: Right arm, Patient Position: Sitting, Cuff Size: Adult Large)      Data Unavailable  Disposition: BP elevated.  Triage RN notified, patient asked to wait    ANA PAULA Woods on 10/29/2024 8:19 AM    Eliseo Oneal is a 59 year old patient who comes in today for a Blood Pressure check.  Initial BP:  BP (!) 176/109 (BP Location: Left arm, Patient Position: Sitting, Cuff Size: Adult Large)      Data Unavailable  Disposition: BP elevated.  Triage RN notified, patient asked to wait    ANA PAULA Woods on 10/29/2024 8:20 AM

## 2024-10-30 ENCOUNTER — MYC MEDICAL ADVICE (OUTPATIENT)
Dept: FAMILY MEDICINE | Facility: CLINIC | Age: 59
End: 2024-10-30
Payer: COMMERCIAL

## 2024-10-31 ENCOUNTER — VIRTUAL VISIT (OUTPATIENT)
Dept: FAMILY MEDICINE | Facility: CLINIC | Age: 59
End: 2024-10-31
Payer: COMMERCIAL

## 2024-10-31 DIAGNOSIS — M54.12 CERVICAL RADICULOPATHY: Primary | ICD-10-CM

## 2024-10-31 PROCEDURE — 99213 OFFICE O/P EST LOW 20 MIN: CPT | Mod: 95 | Performed by: FAMILY MEDICINE

## 2024-10-31 RX ORDER — METHYLPREDNISOLONE 4 MG/1
TABLET ORAL
Qty: 21 TABLET | Refills: 0 | Status: SHIPPED | OUTPATIENT
Start: 2024-10-31

## 2024-10-31 NOTE — PROGRESS NOTES
J Carlos is a 59 year old who is being evaluated via a billable video visit.    How would you like to obtain your AVS? MyChart  If the video visit is dropped, the invitation should be resent by: Text to cell phone: [phone]  Will anyone else be joining your video visit? No      Assessment & Plan     Cervical radiculopathy  Symptoms consistent with cervical radiculopathy. Treat acute symptoms with muscle relaxant and steroid taper. If not improving, follow up with spine clinic.  - methylPREDNISolone (MEDROL DOSEPAK) 4 MG tablet therapy pack; Follow Package Directions  - tiZANidine (ZANAFLEX) 4 MG tablet; Take 1 tablet (4 mg) by mouth 3 times daily as needed for muscle spasms.  - Spine  Referral; Future      Subjective   J Carlos is a 59 year old, presenting for the following health issues:  No chief complaint on file.    History of Present Illness       Back Pain:  He presents for follow up of back pain. Patient's back pain is a recurring problem.  Location of back pain:  Right side of neck  Description of back pain: burning and sharp  Back pain spreads: right shoulder    Since patient first noticed back pain, pain is: rapidly worsening  Does back pain interfere with his job:  Yes      He is taking medications regularly.     Similar symptoms from 2 years ago. No inciting event at this time. Pain in right side of neck with radiating into shoulder. Has been taking ibuprofen, resting.           Review of Systems  Constitutional, HEENT, cardiovascular, pulmonary, gi and gu systems are negative, except as otherwise noted.      Objective           Vitals:  No vitals were obtained today due to virtual visit.    Physical Exam   GENERAL: alert and no distress  EYES: Eyes grossly normal to inspection.  No discharge or erythema, or obvious scleral/conjunctival abnormalities.  RESP: No audible wheeze, cough, or visible cyanosis.    SKIN: Visible skin clear. No significant rash, abnormal pigmentation or lesions.  NEURO:  Cranial nerves grossly intact.  Mentation and speech appropriate for age.  PSYCH: Appropriate affect, tone, and pace of words          Video-Visit Details    Type of service:  Video Visit   Originating Location (pt. Location): Home    Distant Location (provider location):  On-site  Platform used for Video Visit: Maria T  Signed Electronically by: Franco Almazan DO

## 2024-11-01 ENCOUNTER — PATIENT OUTREACH (OUTPATIENT)
Dept: CARE COORDINATION | Facility: CLINIC | Age: 59
End: 2024-11-01
Payer: COMMERCIAL

## 2024-11-04 ENCOUNTER — PATIENT OUTREACH (OUTPATIENT)
Dept: CARE COORDINATION | Facility: CLINIC | Age: 59
End: 2024-11-04
Payer: COMMERCIAL

## 2024-11-05 ENCOUNTER — ALLIED HEALTH/NURSE VISIT (OUTPATIENT)
Dept: FAMILY MEDICINE | Facility: CLINIC | Age: 59
End: 2024-11-05
Payer: COMMERCIAL

## 2024-11-05 VITALS — SYSTOLIC BLOOD PRESSURE: 130 MMHG | DIASTOLIC BLOOD PRESSURE: 88 MMHG

## 2024-11-05 DIAGNOSIS — I10 HYPERTENSION GOAL BP (BLOOD PRESSURE) < 140/90: Primary | ICD-10-CM

## 2024-11-05 PROCEDURE — 99207 PR NO CHARGE NURSE ONLY: CPT

## 2024-11-05 NOTE — PROGRESS NOTES
"Chief Complaint   Patient presents with    Blood Pressure Check      Initial /88 (BP Location: Right arm, Patient Position: Sitting, Cuff Size: Adult Large)  Estimated body mass index is 38.43 kg/m  as calculated from the following:    Height as of 10/18/24: 1.74 m (5' 8.5\").    Weight as of 10/18/24: 116.3 kg (256 lb 8 oz)..    FRANC Grossman CMA    "

## 2024-11-23 DIAGNOSIS — I10 HYPERTENSION GOAL BP (BLOOD PRESSURE) < 140/90: ICD-10-CM

## 2024-11-25 RX ORDER — AMLODIPINE BESYLATE 5 MG/1
5 TABLET ORAL DAILY
Qty: 90 TABLET | Refills: 0 | OUTPATIENT
Start: 2024-11-25

## 2024-11-25 RX ORDER — LISINOPRIL 40 MG/1
TABLET ORAL
Qty: 90 TABLET | Refills: 2 | Status: SHIPPED | OUTPATIENT
Start: 2024-11-25

## 2024-11-27 ENCOUNTER — OFFICE VISIT (OUTPATIENT)
Dept: FAMILY MEDICINE | Facility: CLINIC | Age: 59
End: 2024-11-27
Payer: COMMERCIAL

## 2024-11-27 VITALS
TEMPERATURE: 98.5 F | HEART RATE: 70 BPM | RESPIRATION RATE: 14 BRPM | DIASTOLIC BLOOD PRESSURE: 87 MMHG | BODY MASS INDEX: 38.36 KG/M2 | HEIGHT: 69 IN | WEIGHT: 259 LBS | SYSTOLIC BLOOD PRESSURE: 132 MMHG | OXYGEN SATURATION: 96 %

## 2024-11-27 DIAGNOSIS — I10 HYPERTENSION GOAL BP (BLOOD PRESSURE) < 140/90: ICD-10-CM

## 2024-11-27 PROCEDURE — G2211 COMPLEX E/M VISIT ADD ON: HCPCS | Performed by: PHYSICIAN ASSISTANT

## 2024-11-27 PROCEDURE — 99213 OFFICE O/P EST LOW 20 MIN: CPT | Performed by: PHYSICIAN ASSISTANT

## 2024-11-27 RX ORDER — AMLODIPINE BESYLATE 10 MG/1
10 TABLET ORAL DAILY
Qty: 90 TABLET | Refills: 1 | Status: SHIPPED | OUTPATIENT
Start: 2024-11-27

## 2024-11-27 NOTE — PROGRESS NOTES
Assessment & Plan     Hypertension goal BP (blood pressure) < 140/90  Stable and doing well. Follow-up in 6 months   - amLODIPine (NORVASC) 10 MG tablet; Take 1 tablet (10 mg) by mouth daily.                Latrell Whitman is a 59 year old, presenting for the following health issues:  Hypertension        11/27/2024     2:10 PM   Additional Questions   Roomed by Hieu Batista   Accompanied by self       [employer]   History of Present Illness       Hypertension: He presents for follow up of hypertension.  He does not check blood pressure  regularly outside of the clinic. Outside blood pressures have been over 140/90. He does not follow a low salt diet.     He eats 2-3 servings of fruits and vegetables daily.He consumes 1 sweetened beverage(s) daily.He exercises with enough effort to increase his heart rate 30 to 60 minutes per day.  He exercises with enough effort to increase his heart rate 3 or less days per week.   He is taking medications regularly.                 Review of Systems  CONSTITUTIONAL: NEGATIVE for fever, chills, change in weight  INTEGUMENTARY/SKIN: NEGATIVE for worrisome rashes, moles or lesions  EYES: NEGATIVE for vision changes or irritation  ENT/MOUTH: NEGATIVE for ear, mouth and throat problems  RESP: NEGATIVE for significant cough or SOB  BREAST: NEGATIVE for masses, tenderness or discharge  CV: NEGATIVE for chest pain, palpitations or peripheral edema  GI: NEGATIVE for nausea, abdominal pain, heartburn, or change in bowel habits  : NEGATIVE for frequency, dysuria, or hematuria  MUSCULOSKELETAL: NEGATIVE for significant arthralgias or myalgia  NEURO: NEGATIVE for weakness, dizziness or paresthesias  ENDOCRINE: NEGATIVE for temperature intolerance, skin/hair changes  HEME: NEGATIVE for bleeding problems  PSYCHIATRIC: NEGATIVE for changes in mood or affect      Objective    /87 (BP Location: Right arm, Patient Position: Chair, Cuff Size: Adult Large)   Pulse 70   Temp 98.5  " F (36.9  C) (Oral)   Resp 14   Ht 1.74 m (5' 8.5\")   Wt 117.5 kg (259 lb)   SpO2 96%   BMI 38.81 kg/m    Body mass index is 38.81 kg/m .  Physical Exam   GENERAL: alert and no distress  RESP: lungs clear to auscultation - no rales, rhonchi or wheezes  CV: regular rate and rhythm, normal S1 S2, no S3 or S4, no murmur, click or rub, no peripheral edema   MS: no gross musculoskeletal defects noted, no edema  PSYCH: mentation appears normal, affect normal/bright            Signed Electronically by: Ramona Ann Aaseby-Aguilera, PA-C    "

## 2025-03-31 ENCOUNTER — PATIENT OUTREACH (OUTPATIENT)
Dept: CARE COORDINATION | Facility: CLINIC | Age: 60
End: 2025-03-31
Payer: COMMERCIAL

## 2025-06-03 ENCOUNTER — OFFICE VISIT (OUTPATIENT)
Dept: FAMILY MEDICINE | Facility: CLINIC | Age: 60
End: 2025-06-03
Payer: COMMERCIAL

## 2025-06-03 VITALS
SYSTOLIC BLOOD PRESSURE: 138 MMHG | DIASTOLIC BLOOD PRESSURE: 78 MMHG | HEIGHT: 69 IN | WEIGHT: 260 LBS | TEMPERATURE: 98 F | HEART RATE: 67 BPM | OXYGEN SATURATION: 97 % | RESPIRATION RATE: 16 BRPM | BODY MASS INDEX: 38.51 KG/M2

## 2025-06-03 DIAGNOSIS — Z12.11 SPECIAL SCREENING FOR MALIGNANT NEOPLASMS, COLON: ICD-10-CM

## 2025-06-03 DIAGNOSIS — E66.01 MORBID OBESITY (H): ICD-10-CM

## 2025-06-03 DIAGNOSIS — I10 HYPERTENSION GOAL BP (BLOOD PRESSURE) < 140/90: Primary | ICD-10-CM

## 2025-06-03 PROCEDURE — 3078F DIAST BP <80 MM HG: CPT | Performed by: PHYSICIAN ASSISTANT

## 2025-06-03 PROCEDURE — 3075F SYST BP GE 130 - 139MM HG: CPT | Performed by: PHYSICIAN ASSISTANT

## 2025-06-03 PROCEDURE — G2211 COMPLEX E/M VISIT ADD ON: HCPCS | Performed by: PHYSICIAN ASSISTANT

## 2025-06-03 PROCEDURE — 99213 OFFICE O/P EST LOW 20 MIN: CPT | Performed by: PHYSICIAN ASSISTANT

## 2025-06-03 PROCEDURE — 1126F AMNT PAIN NOTED NONE PRSNT: CPT | Performed by: PHYSICIAN ASSISTANT

## 2025-06-03 RX ORDER — AMLODIPINE BESYLATE 10 MG/1
10 TABLET ORAL DAILY
Qty: 90 TABLET | Refills: 1 | Status: SHIPPED | OUTPATIENT
Start: 2025-06-03

## 2025-06-03 RX ORDER — LISINOPRIL 40 MG/1
TABLET ORAL
Qty: 90 TABLET | Refills: 2 | Status: SHIPPED | OUTPATIENT
Start: 2025-06-03

## 2025-06-03 ASSESSMENT — PAIN SCALES - GENERAL: PAINLEVEL_OUTOF10: NO PAIN (0)

## 2025-06-03 NOTE — PROGRESS NOTES
"  Assessment & Plan     Hypertension goal BP (blood pressure) < 140/90  Stable   - amLODIPine (NORVASC) 10 MG tablet; Take 1 tablet (10 mg) by mouth daily.  - lisinopril (ZESTRIL) 40 MG tablet; TAKE 1 TABLET BY MOUTH EVERY DAY    Special screening for malignant neoplasms, colon    - Colonoscopy Screening  Referral; Future    Morbid obesity (H)  Discussed cleaning up diet and low sodium choices.           BMI  Estimated body mass index is 38.68 kg/m  as calculated from the following:    Height as of this encounter: 1.746 m (5' 8.75\").    Weight as of this encounter: 117.9 kg (260 lb).   Weight management plan: Discussed healthy diet and exercise guidelines          Latrell Whitman is a 60 year old, presenting for the following health issues:  RECHECK (Follow up HTN)        6/3/2025     9:14 AM   Additional Questions   Roomed by Jagdish   Accompanied by self     History of Present Illness       Hypertension: He presents for follow up of hypertension.  He does not check blood pressure  regularly outside of the clinic. Outpatient blood pressures have not been over 140/90. He follows a low salt diet.     He eats 0-1 servings of fruits and vegetables daily.He consumes 1 sweetened beverage(s) daily.He exercises with enough effort to increase his heart rate 20 to 29 minutes per day.  He exercises with enough effort to increase his heart rate 3 or less days per week.   He is taking medications regularly.                  Review of Systems  CONSTITUTIONAL: NEGATIVE for fever, chills, change in weight  INTEGUMENTARY/SKIN: NEGATIVE for worrisome rashes, moles or lesions  EYES: NEGATIVE for vision changes or irritation  ENT/MOUTH: NEGATIVE for ear, mouth and throat problems  RESP: NEGATIVE for significant cough or SOB  BREAST: NEGATIVE for masses, tenderness or discharge  CV: NEGATIVE for chest pain, palpitations or peripheral edema  GI: NEGATIVE for nausea, abdominal pain, heartburn, or change in bowel habits  : " "NEGATIVE for frequency, dysuria, or hematuria  MUSCULOSKELETAL: NEGATIVE for significant arthralgias or myalgia  NEURO: NEGATIVE for weakness, dizziness or paresthesias  ENDOCRINE: NEGATIVE for temperature intolerance, skin/hair changes  HEME: NEGATIVE for bleeding problems  PSYCHIATRIC: NEGATIVE for changes in mood or affect      Objective    /78   Pulse 67   Temp 98  F (36.7  C) (Oral)   Resp 16   Ht 1.746 m (5' 8.75\")   Wt 117.9 kg (260 lb)   SpO2 97%   BMI 38.68 kg/m    Body mass index is 38.68 kg/m .  Physical Exam   GENERAL: alert and no distress  HENT: ear canals and TM's normal, nose and mouth without ulcers or lesions  RESP: lungs clear to auscultation - no rales, rhonchi or wheezes  CV: regular rate and rhythm, normal S1 S2, no S3 or S4, no murmur, click or rub, no peripheral edema   MS: no gross musculoskeletal defects noted, no edema  PSYCH: mentation appears normal, affect normal/bright            Signed Electronically by: Ramona Ann Aaseby-Aguilera, PA-C    "